# Patient Record
Sex: FEMALE | Race: WHITE
[De-identification: names, ages, dates, MRNs, and addresses within clinical notes are randomized per-mention and may not be internally consistent; named-entity substitution may affect disease eponyms.]

---

## 2017-01-10 ENCOUNTER — HOSPITAL ENCOUNTER (OUTPATIENT)
Dept: HOSPITAL 47 - RADNMMAIN | Age: 57
Discharge: HOME | End: 2017-01-10
Payer: MEDICAID

## 2017-01-10 DIAGNOSIS — I20.9: Primary | ICD-10-CM

## 2017-01-10 PROCEDURE — 93017 CV STRESS TEST TRACING ONLY: CPT

## 2017-01-10 PROCEDURE — 93350 STRESS TTE ONLY: CPT

## 2017-01-10 NOTE — ECHOS
DATE OF SERVICE: 01/10/2017



AGE:   56Y        SEX:  F        HT:  65      WT:  180 lbs.           

Protocol Lenin: X Others: Stress Echo Stage: III Dur. of Exercise:  

7-1/2 minutes 



*Heart Rate         Blood Pressure                     

*Rest:  70                    Rest:  163/85                           

*                              

*Max. Achieved:     152  Maximum BP:  180/63 

       85% PMHR:  138                    

          100% PMHR:  162          



*METS:  9.1    





INDICATIONS:  Chest pain. 



MEDICATIONS: 



EKG revealed a normal sinus rhythm without significant ST-T changes. 

Patient walked on standard Lenin protocol for 7-1/2 minutes, achieved 

a maximum heart rate of 152 beats, which is well above 85% of 

predicted maximal. She developed fatigue and shortness of breath, but 

did not have any anginal symptoms. EKG did not reveal any ST segment 

changes to indicate ischemia. By EKG criteria, this is a negative 

stress test with fair exercise capacity.  



Baseline echo images reveal normal wall motion and wall thickening of 

all segments.  



At peak exercise, there was good augmentation of left ventricular wall 

motion and wall thickening of all segments suggesting that there is no 

evidence of any stress-induced ischemia on this study.  



FINAL IMPRESSION: 

1. Fair exercise capacity with a negative stress test by EKG criteria. 

2. Normal stress echocardiogram.

## 2017-01-14 ENCOUNTER — HOSPITAL ENCOUNTER (OUTPATIENT)
Dept: HOSPITAL 47 - LABWHC1 | Age: 57
Discharge: HOME | End: 2017-01-14
Attending: ALLERGY & IMMUNOLOGY
Payer: MEDICAID

## 2017-01-14 DIAGNOSIS — L50.9: Primary | ICD-10-CM

## 2017-01-14 PROCEDURE — 86376 MICROSOMAL ANTIBODY EACH: CPT

## 2017-01-14 PROCEDURE — 36415 COLL VENOUS BLD VENIPUNCTURE: CPT

## 2017-01-14 PROCEDURE — 82784 ASSAY IGA/IGD/IGG/IGM EACH: CPT

## 2017-01-14 PROCEDURE — 86800 THYROGLOBULIN ANTIBODY: CPT

## 2017-01-14 PROCEDURE — 82785 ASSAY OF IGE: CPT

## 2017-01-16 LAB — IGG SERPL-MCNC: 896 MG/DL (ref 700–1600)

## 2017-01-19 ENCOUNTER — HOSPITAL ENCOUNTER (OUTPATIENT)
Dept: HOSPITAL 47 - LABWHC1 | Age: 57
Discharge: HOME | End: 2017-01-19
Attending: ALLERGY & IMMUNOLOGY
Payer: MEDICAID

## 2017-01-19 DIAGNOSIS — E06.3: Primary | ICD-10-CM

## 2017-01-19 PROCEDURE — 84443 ASSAY THYROID STIM HORMONE: CPT

## 2017-01-19 PROCEDURE — 36415 COLL VENOUS BLD VENIPUNCTURE: CPT

## 2017-02-08 LAB — MIS TEST REQUESTED (BLOOD): (no result)

## 2017-02-16 ENCOUNTER — HOSPITAL ENCOUNTER (OUTPATIENT)
Dept: HOSPITAL 47 - RADMRIMAIN | Age: 57
Discharge: HOME | End: 2017-02-16
Payer: MEDICAID

## 2017-02-16 DIAGNOSIS — M25.551: Primary | ICD-10-CM

## 2017-02-16 NOTE — MR
MR right hip

 

HISTORY: Right hip pain

 

Correlation to plain film second of February 2017

 

Multiplanar multisequence imaging obtained through the pelvis with small field-of-view images through
 the right hip

 

Coronal T2-weighted images show suggestion of increased signal at the femoral head however this is no
t correlated on additional imaging may be artifactual. Increased signal at the level of the acetabula
r labrum is present, difficult to exclude a labral tear. No sizable joint effusions are present. Dege
nerative disc changes are present at the lumbosacral junction loss of disc space, possible vacuum phe
nomenon, marginal spurring. There is some fluid signal present at the level of the greater trochanter
 at the insertion site, difficult to exclude a partial tear, strain. Articular cartilage signal is th
ought to be maintained. No significant hypertrophic change.

 

IMPRESSION: Degenerative disc disease lumbosacral junction. Difficult to exclude a partial tear, stra
in at the insertion of the gluteus tendon on the greater trochanter. Additional findings above.

## 2017-03-16 ENCOUNTER — HOSPITAL ENCOUNTER (OUTPATIENT)
Dept: HOSPITAL 47 - RADUSWWP | Age: 57
Discharge: HOME | End: 2017-03-16
Attending: ALLERGY & IMMUNOLOGY
Payer: MEDICAID

## 2017-03-16 DIAGNOSIS — E06.3: Primary | ICD-10-CM

## 2017-03-16 PROCEDURE — 81383 HLA II TYPING 1 ALLELE HR: CPT

## 2017-03-16 PROCEDURE — 76536 US EXAM OF HEAD AND NECK: CPT

## 2017-03-16 PROCEDURE — 83516 IMMUNOASSAY NONANTIBODY: CPT

## 2017-03-16 PROCEDURE — 82784 ASSAY IGA/IGD/IGG/IGM EACH: CPT

## 2017-03-16 PROCEDURE — 81376 HLA II TYPING 1 LOCUS LR: CPT

## 2017-03-16 NOTE — US
EXAMINATION TYPE: US thyroid st tissue head/neck

 

DATE OF EXAM: 3/16/2017 7:14 AM

 

COMPARISON: NONE

 

CLINICAL HISTORY: E06.3 Autoimmune thyroiditis.

 

GLAND SIZE:

 

Right Lobe: 3.6 x  1.2 x 1.2 cm

** Overall Parenchyma:  homogenous

Left Lobe: 3.8 x 1.2 x 0.9 cm

** Overall Parenchyma:  homogeneous

Isthmus Thickness:  0.2 cm

 

NODULES

 

RIGHT:   # of nodules measured on right: 0

 

 

LEFT:    # of nodules measured on left:  0

 

 

ISTHMUS:    # of nodules measured in the isthmus:  0

 

 

TECHNOLOGIST IMPRESSION:  Bilateral neck scanned, no abnormal lymphadenopathy noted.

 

Thyroid gland is small in size and homogeneous in echotexture without suspicious solid or cystic nodu
les seen.

 

IMPRESSION:

Small size thyroid otherwise unremarkable study.

## 2017-03-17 LAB
GLIADIN AB IGA, DEAMINATED: 7 UNITS (ref ?–20)
GLIADIN AB IGG, DEAMINATED: 3 UNITS (ref ?–20)

## 2017-03-21 LAB — MIS TEST REQUESTED (BLOOD): (no result)

## 2017-05-25 ENCOUNTER — HOSPITAL ENCOUNTER (OUTPATIENT)
Dept: HOSPITAL 47 - LABWHC1 | Age: 57
Discharge: HOME | End: 2017-05-25
Payer: MEDICAID

## 2017-05-25 DIAGNOSIS — E03.9: Primary | ICD-10-CM

## 2017-05-25 PROCEDURE — 36415 COLL VENOUS BLD VENIPUNCTURE: CPT

## 2017-05-25 PROCEDURE — 84443 ASSAY THYROID STIM HORMONE: CPT

## 2017-05-25 PROCEDURE — 84439 ASSAY OF FREE THYROXINE: CPT

## 2017-09-28 ENCOUNTER — HOSPITAL ENCOUNTER (OUTPATIENT)
Dept: HOSPITAL 47 - LABWHC1 | Age: 57
Discharge: HOME | End: 2017-09-28
Payer: MEDICAID

## 2017-09-28 DIAGNOSIS — E03.9: Primary | ICD-10-CM

## 2017-09-28 PROCEDURE — 84443 ASSAY THYROID STIM HORMONE: CPT

## 2017-09-28 PROCEDURE — 84439 ASSAY OF FREE THYROXINE: CPT

## 2017-09-28 PROCEDURE — 36415 COLL VENOUS BLD VENIPUNCTURE: CPT

## 2017-10-14 ENCOUNTER — HOSPITAL ENCOUNTER (EMERGENCY)
Dept: HOSPITAL 47 - EC | Age: 57
Discharge: HOME | End: 2017-10-14
Payer: MEDICAID

## 2017-10-14 VITALS
HEART RATE: 84 BPM | DIASTOLIC BLOOD PRESSURE: 80 MMHG | RESPIRATION RATE: 18 BRPM | SYSTOLIC BLOOD PRESSURE: 180 MMHG | TEMPERATURE: 98.1 F

## 2017-10-14 DIAGNOSIS — Z79.899: ICD-10-CM

## 2017-10-14 DIAGNOSIS — J20.9: Primary | ICD-10-CM

## 2017-10-14 DIAGNOSIS — Z88.5: ICD-10-CM

## 2017-10-14 DIAGNOSIS — E07.9: ICD-10-CM

## 2017-10-14 LAB
ALP SERPL-CCNC: 101 U/L (ref 38–126)
ALT SERPL-CCNC: 26 U/L (ref 9–52)
ANION GAP SERPL CALC-SCNC: 14 MMOL/L
APTT BLD: 22.3 SEC (ref 22–30)
AST SERPL-CCNC: 19 U/L (ref 14–36)
BASOPHILS # BLD AUTO: 0.1 K/UL (ref 0–0.2)
BASOPHILS NFR BLD AUTO: 1 %
BUN SERPL-SCNC: 19 MG/DL (ref 7–17)
CALCIUM SPEC-MCNC: 9.1 MG/DL (ref 8.4–10.2)
CH: 29.2
CHCM: 33.9
CHLORIDE SERPL-SCNC: 105 MMOL/L (ref 98–107)
CK SERPL-CCNC: 51 U/L (ref 30–135)
CO2 SERPL-SCNC: 21 MMOL/L (ref 22–30)
EOSINOPHIL # BLD AUTO: 0.3 K/UL (ref 0–0.7)
EOSINOPHIL NFR BLD AUTO: 3 %
ERYTHROCYTE [DISTWIDTH] IN BLOOD BY AUTOMATED COUNT: 4.64 M/UL (ref 3.8–5.4)
ERYTHROCYTE [DISTWIDTH] IN BLOOD: 12.9 % (ref 11.5–15.5)
GLUCOSE SERPL-MCNC: 96 MG/DL (ref 74–99)
HCT VFR BLD AUTO: 40.2 % (ref 34–46)
HDW: 2.51
HGB BLD-MCNC: 13.6 GM/DL (ref 11.4–16)
INR PPP: 1 (ref ?–1.2)
LUC NFR BLD AUTO: 3 %
LYMPHOCYTES # SPEC AUTO: 2.6 K/UL (ref 1–4.8)
LYMPHOCYTES NFR SPEC AUTO: 26 %
MAGNESIUM SPEC-SCNC: 1.8 MG/DL (ref 1.6–2.3)
MCH RBC QN AUTO: 29.3 PG (ref 25–35)
MCHC RBC AUTO-ENTMCNC: 33.9 G/DL (ref 31–37)
MCV RBC AUTO: 86.6 FL (ref 80–100)
MONOCYTES # BLD AUTO: 0.7 K/UL (ref 0–1)
MONOCYTES NFR BLD AUTO: 7 %
NEUTROPHILS # BLD AUTO: 6 K/UL (ref 1.3–7.7)
NEUTROPHILS NFR BLD AUTO: 61 %
NON-AFRICAN AMERICAN GFR(MDRD): 52
POTASSIUM SERPL-SCNC: 4.2 MMOL/L (ref 3.5–5.1)
PROT SERPL-MCNC: 6.6 G/DL (ref 6.3–8.2)
PT BLD: 9.8 SEC (ref 9–12)
SODIUM SERPL-SCNC: 140 MMOL/L (ref 137–145)
TROPONIN I SERPL-MCNC: <0.012 NG/ML (ref 0–0.03)
WBC # BLD AUTO: 0.34 10*3/UL
WBC # BLD AUTO: 9.9 K/UL (ref 3.8–10.6)
WBC (PEROX): 9.95

## 2017-10-14 PROCEDURE — 83880 ASSAY OF NATRIURETIC PEPTIDE: CPT

## 2017-10-14 PROCEDURE — 93005 ELECTROCARDIOGRAM TRACING: CPT

## 2017-10-14 PROCEDURE — 82553 CREATINE MB FRACTION: CPT

## 2017-10-14 PROCEDURE — 99285 EMERGENCY DEPT VISIT HI MDM: CPT

## 2017-10-14 PROCEDURE — 83735 ASSAY OF MAGNESIUM: CPT

## 2017-10-14 PROCEDURE — 36415 COLL VENOUS BLD VENIPUNCTURE: CPT

## 2017-10-14 PROCEDURE — 94640 AIRWAY INHALATION TREATMENT: CPT

## 2017-10-14 PROCEDURE — 84484 ASSAY OF TROPONIN QUANT: CPT

## 2017-10-14 PROCEDURE — 85730 THROMBOPLASTIN TIME PARTIAL: CPT

## 2017-10-14 PROCEDURE — 85379 FIBRIN DEGRADATION QUANT: CPT

## 2017-10-14 PROCEDURE — 80053 COMPREHEN METABOLIC PANEL: CPT

## 2017-10-14 PROCEDURE — 85610 PROTHROMBIN TIME: CPT

## 2017-10-14 PROCEDURE — 85025 COMPLETE CBC W/AUTO DIFF WBC: CPT

## 2017-10-14 PROCEDURE — 96374 THER/PROPH/DIAG INJ IV PUSH: CPT

## 2017-10-14 PROCEDURE — 71020: CPT

## 2017-10-14 PROCEDURE — 82550 ASSAY OF CK (CPK): CPT

## 2017-10-14 NOTE — XR
EXAMINATION TYPE: XR chest 2V

 

DATE OF EXAM: 10/14/2017

 

COMPARISON: None

 

HISTORY: 57-year-old female difficulty breathing, shortness of breath for 10 days

 

TECHNIQUE:  Frontal and lateral views

 

FINDINGS:  

Heart is normal size atherosclerotic arch calcifications. Mild interstitial prominence of a chronic a
ppearance. No consolidation or pleural effusion.

 

 

 

IMPRESSION:  

Chronic appearing changes, possible bronchitis or chronic asthma. Otherwise, no acute process seen.

## 2017-10-14 NOTE — ED
General Adult HPI





- General


Chief complaint: Shortness of Breath


Stated complaint: SOB


Time Seen by Provider: 10/14/17 16:06


Source: patient, RN notes reviewed


Mode of arrival: wheelchair


Limitations: no limitations





- History of Present Illness


Initial comments: 





57-year-old female with no significant past medical history presents for 

evaluation of dyspnea.  Patient states approximately 2 weeks ago she developed 

cough, this was dry nature.  She was treated for pneumonia by her primary care 

physician.  Cough improved, but dyspnea has not improved.  She has had 

worsening symptoms over the past several days.  Today her dyspnea significantly 

worsen.  Denies chest pain.  Denies lower extremity swelling.  No recent 

travel.  No fever, patient does state she had some chills and diaphoresis 

earlier in the week.  Patient does have family history of coronary artery 

disease, she has no known history herself.  Denies abdominal pain.  Denies 

nausea vomiting or diarrhea.





- Related Data


 Home Medications











 Medication  Instructions  Recorded  Confirmed


 


ALPRAZolam [Xanax] 0.5 mg PO DAILY PRN 10/14/17 10/14/17


 


Codeine Phosphate/Guaifenesin 5 ml PO Q6HR PRN 10/14/17 10/14/17





[Cheratussin AC Syrup]   


 


Levothyroxine Sodium [Synthroid] 150 mcg PO DAILY 10/14/17 10/14/17








 Previous Rx's











 Medication  Instructions  Recorded


 


Amoxicillin/Potassium Clav 1 tab PO Q12HR #14 tab 10/14/17





[Augmentin 875-125 Tablet]  


 


predniSONE 50 mg PO DAILY #5 tab 10/14/17











 Allergies











Allergy/AdvReac Type Severity Reaction Status Date / Time


 


acetaminophen AdvReac  SHORTNESS Verified 10/14/17 16:24





[From Darvocet-N]   OF BREATHE  


 


hydrocodone AdvReac  SHORTNESS Verified 10/14/17 16:24





   OF BREATHE  


 


propoxyphene napsylate AdvReac  SHORTNESS Verified 10/14/17 16:24





[From Darvocet-N]   OF BREATHE  














Review of Systems


ROS Statement: 


Those systems with pertinent positive or pertinent negative responses have been 

documented in the HPI.





ROS Other: All systems not noted in ROS Statement are negative.





Past Medical History


Past Medical History: Thyroid Disorder


Additional Past Medical History / Comment(s): mitral valve prolapse


History of Any Multi-Drug Resistant Organisms: None Reported


Past Surgical History: Appendectomy, Tubal Ligation


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


Past Psychological History: No Psychological Hx Reported


Smoking Status: Never smoker


Past Alcohol Use History: Occasional


Past Drug Use History: None Reported





General Exam


Limitations: no limitations


General appearance: alert, in no apparent distress


Head exam: Present: atraumatic, normocephalic


Eye exam: Present: normal appearance, PERRL


ENT exam: Present: normal exam


Neck exam: Present: normal inspection.  Absent: tenderness, meningismus


Respiratory exam: Present: normal lung sounds bilaterally.  Absent: respiratory 

distress, wheezes, rales


Cardiovascular Exam: Present: regular rate, normal rhythm


GI/Abdominal exam: Present: soft.  Absent: distended, tenderness


Extremities exam: Present: normal inspection, normal capillary refill.  Absent: 

pedal edema, calf tenderness


Back exam: Present: normal inspection


Neurological exam: Present: alert, oriented X3


Psychiatric exam: Present: normal affect, normal mood


Skin exam: Present: warm, dry, intact.  Absent: cyanosis, diaphoretic





Course


 Vital Signs











  10/14/17 10/14/17 10/14/17





  15:56 17:00 18:07


 


Temperature 98.2 F  


 


Pulse Rate 88 75 71


 


Respiratory 24 16 16





Rate   


 


Blood Pressure 195/98 188/90 189/92


 


O2 Sat by Pulse 98 98 98





Oximetry   














  10/14/17 10/14/17 10/14/17





  18:20 18:36 18:48


 


Temperature   


 


Pulse Rate 61 63 68


 


Respiratory   





Rate   


 


Blood Pressure 166/74  


 


O2 Sat by Pulse   





Oximetry   














EKG Findings





- EKG Comments:


EKG Findings:: EKG shows normal sinus rhythm, ventricular rate 74, CA interval 

146, QRS duration 74, , T-wave flattening and inversion in lead 3, no ST 

segment elevation or depression





Medical Decision Making





- Lab Data


Result diagrams: 


 10/14/17 17:01





 10/14/17 17:01


 Lab Results











  10/14/17 10/14/17 10/14/17 Range/Units





  17:01 17:01 17:01 


 


WBC   9.9   (3.8-10.6)  k/uL


 


RBC   4.64   (3.80-5.40)  m/uL


 


Hgb   13.6   (11.4-16.0)  gm/dL


 


Hct   40.2   (34.0-46.0)  %


 


MCV   86.6   (80.0-100.0)  fL


 


MCH   29.3   (25.0-35.0)  pg


 


MCHC   33.9   (31.0-37.0)  g/dL


 


RDW   12.9   (11.5-15.5)  %


 


Plt Count   382   (150-450)  k/uL


 


Neutrophils %   61   %


 


Lymphocytes %   26   %


 


Monocytes %   7   %


 


Eosinophils %   3   %


 


Basophils %   1   %


 


Neutrophils #   6.0   (1.3-7.7)  k/uL


 


Lymphocytes #   2.6   (1.0-4.8)  k/uL


 


Monocytes #   0.7   (0-1.0)  k/uL


 


Eosinophils #   0.3   (0-0.7)  k/uL


 


Basophils #   0.1   (0-0.2)  k/uL


 


PT     (9.0-12.0)  sec


 


INR     (<1.2)  


 


APTT     (22.0-30.0)  sec


 


D-Dimer     (<0.60)  mg/L FEU


 


Sodium    140  (137-145)  mmol/L


 


Potassium    4.2  (3.5-5.1)  mmol/L


 


Chloride    105  ()  mmol/L


 


Carbon Dioxide    21 L  (22-30)  mmol/L


 


Anion Gap    14  mmol/L


 


BUN    19 H  (7-17)  mg/dL


 


Creatinine    1.07 H  (0.52-1.04)  mg/dL


 


Est GFR (MDRD) Af Amer    >60  (>60 ml/min/1.73 sqM)  


 


Est GFR (MDRD) Non-Af    52  (>60 ml/min/1.73 sqM)  


 


Glucose    96  (74-99)  mg/dL


 


Calcium    9.1  (8.4-10.2)  mg/dL


 


Magnesium    1.8  (1.6-2.3)  mg/dL


 


Total Bilirubin    0.2  (0.2-1.3)  mg/dL


 


AST    19  (14-36)  U/L


 


ALT    26  (9-52)  U/L


 


Alkaline Phosphatase    101  ()  U/L


 


Total Creatine Kinase  51    ()  U/L


 


CK-MB (CK-2)  1.0    (0.0-2.4)  ng/mL


 


CK-MB (CK-2) Rel Index  2.0    


 


Troponin I  <0.012    (0.000-0.034)  ng/mL


 


NT-Pro-B Natriuret Pep     pg/mL


 


Total Protein    6.6  (6.3-8.2)  g/dL


 


Albumin    3.8  (3.5-5.0)  g/dL














  10/14/17 10/14/17 Range/Units





  17:01 17:01 


 


WBC    (3.8-10.6)  k/uL


 


RBC    (3.80-5.40)  m/uL


 


Hgb    (11.4-16.0)  gm/dL


 


Hct    (34.0-46.0)  %


 


MCV    (80.0-100.0)  fL


 


MCH    (25.0-35.0)  pg


 


MCHC    (31.0-37.0)  g/dL


 


RDW    (11.5-15.5)  %


 


Plt Count    (150-450)  k/uL


 


Neutrophils %    %


 


Lymphocytes %    %


 


Monocytes %    %


 


Eosinophils %    %


 


Basophils %    %


 


Neutrophils #    (1.3-7.7)  k/uL


 


Lymphocytes #    (1.0-4.8)  k/uL


 


Monocytes #    (0-1.0)  k/uL


 


Eosinophils #    (0-0.7)  k/uL


 


Basophils #    (0-0.2)  k/uL


 


PT  9.8   (9.0-12.0)  sec


 


INR  1.0   (<1.2)  


 


APTT  22.3   (22.0-30.0)  sec


 


D-Dimer  0.30   (<0.60)  mg/L FEU


 


Sodium    (137-145)  mmol/L


 


Potassium    (3.5-5.1)  mmol/L


 


Chloride    ()  mmol/L


 


Carbon Dioxide    (22-30)  mmol/L


 


Anion Gap    mmol/L


 


BUN    (7-17)  mg/dL


 


Creatinine    (0.52-1.04)  mg/dL


 


Est GFR (MDRD) Af Amer    (>60 ml/min/1.73 sqM)  


 


Est GFR (MDRD) Non-Af    (>60 ml/min/1.73 sqM)  


 


Glucose    (74-99)  mg/dL


 


Calcium    (8.4-10.2)  mg/dL


 


Magnesium    (1.6-2.3)  mg/dL


 


Total Bilirubin    (0.2-1.3)  mg/dL


 


AST    (14-36)  U/L


 


ALT    (9-52)  U/L


 


Alkaline Phosphatase    ()  U/L


 


Total Creatine Kinase    ()  U/L


 


CK-MB (CK-2)    (0.0-2.4)  ng/mL


 


CK-MB (CK-2) Rel Index    


 


Troponin I    (0.000-0.034)  ng/mL


 


NT-Pro-B Natriuret Pep   209  pg/mL


 


Total Protein    (6.3-8.2)  g/dL


 


Albumin    (3.5-5.0)  g/dL














Disposition


Clinical Impression: 


 Acute bronchitis





Disposition: HOME SELF-CARE


Condition: Good


Instructions:  Acute Bronchitis (ED)


Prescriptions: 


Amoxicillin/Potassium Clav [Augmentin 875-125 Tablet] 1 tab PO Q12HR #14 tab


predniSONE 50 mg PO DAILY #5 tab


Referrals: 


Shahla Pedro MD [Primary Care Provider] - 1-2 days

## 2018-01-03 ENCOUNTER — HOSPITAL ENCOUNTER (OUTPATIENT)
Dept: HOSPITAL 47 - RADXRMAIN | Age: 58
Discharge: HOME | End: 2018-01-03
Payer: MEDICAID

## 2018-01-03 DIAGNOSIS — M79.644: Primary | ICD-10-CM

## 2018-01-04 NOTE — XR
EXAMINATION TYPE: XR finger RT

 

DATE OF EXAM: 1/3/2018

 

COMPARISON: NONE

 

HISTORY: Pain

 

TECHNIQUE: Three views are submitted.

 

FINDINGS:

The osseous structures are intact.  The joint spaces are preserved and there is no acute fracture or 
dislocation.  

 

IMPRESSION:

1.  No definite acute fracture or dislocation if symptoms persist, follow-up study in 7 to 10 days wo
uld be suggested

## 2018-06-15 ENCOUNTER — HOSPITAL ENCOUNTER (OUTPATIENT)
Dept: HOSPITAL 47 - RADMAMWWP | Age: 58
Discharge: HOME | End: 2018-06-15
Payer: MEDICAID

## 2018-06-15 DIAGNOSIS — Z12.31: Primary | ICD-10-CM

## 2018-06-15 PROCEDURE — 77063 BREAST TOMOSYNTHESIS BI: CPT

## 2018-06-15 PROCEDURE — 77067 SCR MAMMO BI INCL CAD: CPT

## 2018-06-19 NOTE — MM
Reason for exam: screening  (asymptomatic).

Last mammogram was performed 6 years and 3 months ago.



History:

Patient is postmenopausal.

Family history of breast cancer in sister at age 48.



Physical Findings:

A clinical breast exam by your physician is recommended on an annual basis and 

results should be correlated with mammographic findings.



MG 3D Screening Mammo W/Cad

Bilateral CC and MLO view(s) were taken.

Prior study comparison: March 15, 2012, bilateral digital screening mammo w/CAD.  

April 9, 2003, mammogram, performed at [facility] [place].

There are scattered fibroglandular densities.  No significant changes when 

compared with prior studies.





ASSESSMENT: Negative, BI-RAD 1



RECOMMENDATION:

Routine screening mammogram of both breasts in 1 year.

## 2019-01-11 ENCOUNTER — HOSPITAL ENCOUNTER (OUTPATIENT)
Dept: HOSPITAL 47 - RADCTMAIN | Age: 59
Discharge: HOME | End: 2019-01-11
Attending: OTOLARYNGOLOGY
Payer: MEDICAID

## 2019-01-11 DIAGNOSIS — J34.89: ICD-10-CM

## 2019-01-11 DIAGNOSIS — J32.2: Primary | ICD-10-CM

## 2019-01-11 PROCEDURE — 70486 CT MAXILLOFACIAL W/O DYE: CPT

## 2019-01-11 NOTE — CT
EXAMINATION TYPE: CT sinus wo con

 

DATE OF EXAM: 1/11/2019

 

COMPARISON: None

 

HISTORY: Chronic Sinusitis

 

CT DLP: 234 mGycm.  Automated Exposure Control for Dose Reduction was Utilized.

 

TECHNIQUE: CT scan of the sinuses is performed without contrast, axial images are obtained, coronal r
eformatted images are also reviewed.

 

FINDINGS: There is a slight nasal septal deviation there is very minimal mucosal thickening involving
 the maxillary sinus on the left. Mild to moderate mucosal thickening involving ethmoid air cells. Fr
ontal sinus and sphenoid sinus demonstrate no significant mucosal thickening. There are no air-fluid 
levels. Right ostiomeatal complex is patent. Right ostiomeatal complex is occluded. 

 

Visualized portion of mastoid air cells show no abnormal opacification.  The globes are intact bilate
rally.  

 

IMPRESSION:

1. Mild-to-moderate chronic appearing sinusitis and ethmoid air cells with occlusion of the left osti
omeatal complex. 

2. Minimal mucosal thickening involving the left maxillary sinus.

## 2019-04-12 ENCOUNTER — HOSPITAL ENCOUNTER (OUTPATIENT)
Dept: HOSPITAL 47 - ORWHC2ENDO | Age: 59
Discharge: HOME | End: 2019-04-12
Attending: INTERNAL MEDICINE
Payer: MEDICAID

## 2019-04-12 VITALS — BODY MASS INDEX: 32.4 KG/M2

## 2019-04-12 VITALS — RESPIRATION RATE: 18 BRPM

## 2019-04-12 VITALS — DIASTOLIC BLOOD PRESSURE: 78 MMHG | HEART RATE: 65 BPM | SYSTOLIC BLOOD PRESSURE: 131 MMHG

## 2019-04-12 VITALS — TEMPERATURE: 97.7 F

## 2019-04-12 DIAGNOSIS — K44.9: ICD-10-CM

## 2019-04-12 DIAGNOSIS — Z79.899: ICD-10-CM

## 2019-04-12 DIAGNOSIS — Z88.5: ICD-10-CM

## 2019-04-12 DIAGNOSIS — Z12.11: Primary | ICD-10-CM

## 2019-04-12 DIAGNOSIS — Z79.890: ICD-10-CM

## 2019-04-12 DIAGNOSIS — K21.9: ICD-10-CM

## 2019-04-12 DIAGNOSIS — D12.2: ICD-10-CM

## 2019-04-12 DIAGNOSIS — Z88.8: ICD-10-CM

## 2019-04-12 PROCEDURE — 43235 EGD DIAGNOSTIC BRUSH WASH: CPT

## 2019-04-12 PROCEDURE — 45385 COLONOSCOPY W/LESION REMOVAL: CPT

## 2019-04-12 PROCEDURE — 88305 TISSUE EXAM BY PATHOLOGIST: CPT

## 2019-04-12 NOTE — P.PCN
Date of Procedure: 04/12/19


Procedure(s) Performed: 


Brief history:


Patient is a pleasant 59-year-old white female, scheduled for an elective upper 

endoscopy as well as colonoscopy as a part of evaluation of GERD and screening 

for colorectal neoplasia.





Procedure performed:


Esophagogastroduodenoscopy


Colonoscopy with snare polypectomy





Preoperative diagnosis:


GERD/


Screening for colon cancer





Anesthesia: MAC





Procedure:


After informed consent was obtained from the patient  was brought into the 

endoscopy unit and IV  sedation was administered by anesthesia under continuous 

monitoring.  Initially upper endoscopy was done.  The Olympus  video 

endoscope was inserted inserted into the mouth and esophagus intubated without 

any difficulty and was gradually advanced into the stomach and duodenum and 

carefully examined.  The bulb and second part of the duodenum appeared normal.  

The scope was then withdrawn into the stomach adequately insufflated with air 

and upon careful examination  the antrum and body, cardia and fundus appeared 

normal.  The scope was then withdrawn into the esophagus.  The GE junction was 

located at 39 cm to the incisors.  Small hiatal hernia noted.  It appeared 

regular with no erythema erosions or ulcerations.  Rest of the esophagus 

appeared normal.  Patient tolerated the procedure well.





At this time the patient continued to remain sedation.  Initial digital rectal 

examination was normal.  Olympus  video colonoscope was then inserted into

the rectum and gradually advanced to the cecum without any difficulty.  Careful 

examination was performed as the scope was gradually being withdrawn.  The prep 

was excellent.  The cecum  appeared normal.  In the ascending colon there was a 

1 cm flat polyp that was removed by snare polypectomy.  Rest of theascending 

colon, transverse colon, descending colon, sigmoid colon and rectum appeared 

normal.  Retroflexion was performed in the rectum and no lesions were noted.  

Patient tolerated the procedure well.





Impression:


1.  Upper endoscopy revealed small hiatal hernia but no evidence of esophagitis 

or peptic ulcer disease


2.  Colonoscopy revealed 1 cm flat ascending colon polyp status post polypectomy








Recommendations:


Findings of this examination were discussed with the patient as well as her 

family.  She was advised to follow with the biopsy results.  If the biopsy shows

adenoma she can have a repeat colonoscopy in 3-5 years.  Because of the GERD 

symptoms she was advised to continue with Nexium daily and follow antireflux 

measures.

## 2019-07-15 ENCOUNTER — HOSPITAL ENCOUNTER (OUTPATIENT)
Dept: HOSPITAL 47 - LABWHC1 | Age: 59
Discharge: HOME | End: 2019-07-15
Attending: FAMILY MEDICINE
Payer: MEDICAID

## 2019-07-15 DIAGNOSIS — Z00.00: Primary | ICD-10-CM

## 2019-07-15 LAB
ALBUMIN SERPL-MCNC: 4.2 G/DL (ref 3.8–4.9)
ALBUMIN/GLOB SERPL: 1.83 G/DL (ref 1.6–3.17)
ALP SERPL-CCNC: 90 U/L (ref 41–126)
ALT SERPL-CCNC: 17 U/L (ref 8–44)
ANION GAP SERPL CALC-SCNC: 5.8 MMOL/L (ref 4–12)
AST SERPL-CCNC: 17 U/L (ref 13–35)
BUN SERPL-SCNC: 21 MG/DL (ref 9–27)
BUN/CREAT SERPL: 26.25 RATIO (ref 12–20)
CALCIUM SPEC-MCNC: 9.1 MG/DL (ref 8.7–10.3)
CHLORIDE SERPL-SCNC: 109 MMOL/L (ref 96–109)
CHOLEST SERPL-MCNC: 194 MG/DL (ref 0–200)
CO2 SERPL-SCNC: 25.2 MMOL/L (ref 21.6–31.8)
GLOBULIN SER CALC-MCNC: 2.3 G/DL (ref 1.6–3.3)
GLUCOSE SERPL-MCNC: 97 MG/DL (ref 70–110)
HDLC SERPL-MCNC: 66 MG/DL (ref 40–60)
LDLC SERPL CALC-MCNC: 104.6 MG/DL (ref 0–131)
POTASSIUM SERPL-SCNC: 4.6 MMOL/L (ref 3.5–5.5)
PROT SERPL-MCNC: 6.5 G/DL (ref 6.2–8.2)
SODIUM SERPL-SCNC: 140 MMOL/L (ref 135–145)
TRIGL SERPL-MCNC: 117 MG/DL (ref 0–149)
VLDLC SERPL CALC-MCNC: 23.4 MG/DL (ref 5–40)

## 2019-07-15 PROCEDURE — 80053 COMPREHEN METABOLIC PANEL: CPT

## 2019-07-15 PROCEDURE — 80061 LIPID PANEL: CPT

## 2019-07-15 PROCEDURE — 84443 ASSAY THYROID STIM HORMONE: CPT

## 2019-07-15 PROCEDURE — 84439 ASSAY OF FREE THYROXINE: CPT

## 2019-07-15 PROCEDURE — 36415 COLL VENOUS BLD VENIPUNCTURE: CPT

## 2020-03-10 ENCOUNTER — HOSPITAL ENCOUNTER (EMERGENCY)
Dept: HOSPITAL 47 - EC | Age: 60
Discharge: HOME | End: 2020-03-10
Payer: MEDICAID

## 2020-03-10 VITALS
DIASTOLIC BLOOD PRESSURE: 80 MMHG | TEMPERATURE: 100.2 F | RESPIRATION RATE: 16 BRPM | SYSTOLIC BLOOD PRESSURE: 131 MMHG | HEART RATE: 87 BPM

## 2020-03-10 DIAGNOSIS — E07.9: ICD-10-CM

## 2020-03-10 DIAGNOSIS — Z79.890: ICD-10-CM

## 2020-03-10 DIAGNOSIS — Z88.8: ICD-10-CM

## 2020-03-10 DIAGNOSIS — J02.0: Primary | ICD-10-CM

## 2020-03-10 DIAGNOSIS — Z88.5: ICD-10-CM

## 2020-03-10 LAB
ALBUMIN SERPL-MCNC: 4.1 G/DL (ref 3.5–5)
ALP SERPL-CCNC: 89 U/L (ref 38–126)
ALT SERPL-CCNC: 26 U/L (ref 4–34)
ANION GAP SERPL CALC-SCNC: 7 MMOL/L
AST SERPL-CCNC: 29 U/L (ref 14–36)
BASOPHILS # BLD AUTO: 0.1 K/UL (ref 0–0.2)
BASOPHILS NFR BLD AUTO: 0 %
BUN SERPL-SCNC: 14 MG/DL (ref 7–17)
CALCIUM SPEC-MCNC: 8.7 MG/DL (ref 8.4–10.2)
CHLORIDE SERPL-SCNC: 103 MMOL/L (ref 98–107)
CO2 SERPL-SCNC: 24 MMOL/L (ref 22–30)
EOSINOPHIL # BLD AUTO: 0.1 K/UL (ref 0–0.7)
EOSINOPHIL NFR BLD AUTO: 1 %
ERYTHROCYTE [DISTWIDTH] IN BLOOD BY AUTOMATED COUNT: 4.67 M/UL (ref 3.8–5.4)
ERYTHROCYTE [DISTWIDTH] IN BLOOD: 13.5 % (ref 11.5–15.5)
GLUCOSE SERPL-MCNC: 93 MG/DL (ref 74–99)
HCT VFR BLD AUTO: 40 % (ref 34–46)
HGB BLD-MCNC: 13.2 GM/DL (ref 11.4–16)
LYMPHOCYTES # SPEC AUTO: 1.3 K/UL (ref 1–4.8)
LYMPHOCYTES NFR SPEC AUTO: 8 %
MCH RBC QN AUTO: 28.3 PG (ref 25–35)
MCHC RBC AUTO-ENTMCNC: 33 G/DL (ref 31–37)
MCV RBC AUTO: 85.7 FL (ref 80–100)
MONOCYTES # BLD AUTO: 0.8 K/UL (ref 0–1)
MONOCYTES NFR BLD AUTO: 5 %
NEUTROPHILS # BLD AUTO: 13.4 K/UL (ref 1.3–7.7)
NEUTROPHILS NFR BLD AUTO: 84 %
PH UR: 7.5 [PH] (ref 5–8)
PLATELET # BLD AUTO: 341 K/UL (ref 150–450)
POTASSIUM SERPL-SCNC: 4.1 MMOL/L (ref 3.5–5.1)
PROT SERPL-MCNC: 6.9 G/DL (ref 6.3–8.2)
SODIUM SERPL-SCNC: 134 MMOL/L (ref 137–145)
SP GR UR: 1.02 (ref 1–1.03)
UROBILINOGEN UR QL STRIP: <2 MG/DL (ref ?–2)
WBC # BLD AUTO: 15.9 K/UL (ref 3.8–10.6)

## 2020-03-10 PROCEDURE — 87040 BLOOD CULTURE FOR BACTERIA: CPT

## 2020-03-10 PROCEDURE — 96360 HYDRATION IV INFUSION INIT: CPT

## 2020-03-10 PROCEDURE — 71046 X-RAY EXAM CHEST 2 VIEWS: CPT

## 2020-03-10 PROCEDURE — 83605 ASSAY OF LACTIC ACID: CPT

## 2020-03-10 PROCEDURE — 86308 HETEROPHILE ANTIBODY SCREEN: CPT

## 2020-03-10 PROCEDURE — 85025 COMPLETE CBC W/AUTO DIFF WBC: CPT

## 2020-03-10 PROCEDURE — 80053 COMPREHEN METABOLIC PANEL: CPT

## 2020-03-10 PROCEDURE — 36415 COLL VENOUS BLD VENIPUNCTURE: CPT

## 2020-03-10 PROCEDURE — 87430 STREP A AG IA: CPT

## 2020-03-10 PROCEDURE — 81003 URINALYSIS AUTO W/O SCOPE: CPT

## 2020-03-10 PROCEDURE — 99284 EMERGENCY DEPT VISIT MOD MDM: CPT

## 2020-03-10 NOTE — ED
General Adult HPI





- General


Chief complaint: Headache


Stated complaint: head & neck pain/trouble swallowing


Time Seen by Provider: 03/10/20 15:15


Source: patient, RN notes reviewed, old records reviewed


Mode of arrival: ambulatory


Limitations: no limitations





- History of Present Illness


Initial comments: 





This is a 60-year-old female presents emergency department stating that she 

started with a fever 90s ago.  Patient states last Friday she was told she had 

influenza but she was beyond the number of days to be treated.  Patient states 

she continues to have a fever over the weekend complains of some sore throat 

cough with no sputum production and a mild headache.  Patient states she also 

has bilateral neck pain in the sternocleidomastoid muscle region.  Patient 

denies any dysuria hematuria urinary frequency.  Patient denies any abdominal 

pain.  Patient denies any nausea vomiting.  Patient denies any rashes or areas 

of redness.  Patient denies any stiff neck.





- Related Data


                                Home Medications











 Medication  Instructions  Recorded  Confirmed


 


Levothyroxine Sodium [Synthroid] 150 mcg PO DAILY 10/14/17 04/12/19


 


Cannabidiol (Cbd) Extract 0 mg PO DAILY PRN 04/10/19 04/12/19





[Epidiolex]   


 


Ibuprofen [Advil] 200 mg PO Q6HR PRN 04/10/19 04/12/19


 


L.acidoph,Paracasei, B.lactis 1 each PO DAILY 04/10/19 04/12/19





[Probiotic]   


 


Magnesium 200 mg PO DAILY 04/10/19 04/12/19








                                  Previous Rx's











 Medication  Instructions  Recorded


 


Amoxicillin 500 mg PO Q8H #30 capsule 03/10/20


 


predniSONE [Deltasone] 40 mg PO DAILY #8 tab 03/10/20











                                    Allergies











Allergy/AdvReac Type Severity Reaction Status Date / Time


 


hydrocodone AdvReac  SHORTNESS Verified 03/10/20 15:19





   OF BREATHE  


 


propoxyphene napsylate AdvReac  SHORTNESS Verified 03/10/20 15:19





[From Darvocet-N]   OF BREATHE  














Review of Systems


ROS Statement: 


Those systems with pertinent positive or pertinent negative responses have been 

documented in the HPI.





ROS Other: All systems not noted in ROS Statement are negative.





Past Medical History


Past Medical History: GERD/Reflux, Mitral Valve Prolapse (MVP), Pneumonia, 

Thyroid Disorder


Additional Past Medical History / Comment(s): mitral valve prolapse, hx. colon 

polyps, recent change in bowel habits, intermittent RLQ pain


History of Any Multi-Drug Resistant Organisms: None Reported


Past Surgical History: Appendectomy, Tubal Ligation


Additional Past Surgical History / Comment(s): parotid gland tumor removed, 

colonoscopies


Past Anesthesia/Blood Transfusion Reactions: No Reported Reaction, Family 

History of Problems w/ Anesthesia


Additional Past Anesthesia/Blood Transfusion Reaction / Comment(s): 16 y.o. son 

had some chest pain that was attributed to anesthesia per pt.


Past Psychological History: No Psychological Hx Reported


Smoking Status: Never smoker


Past Alcohol Use History: None Reported


Past Drug Use History: None Reported





- Past Family History


  ** Mother Sister(s)


Family Medical History: Cancer


Additional Family Medical History / Comment(s): mom colon, sister brain & breast

cancer





General Exam





- General Exam Comments


Initial Comments: 





GENERAL:


Patient is well-developed and well-nourished.  Patient is nontoxic and well-hy

drated and is in no acute distress.





ENT:


Neck is soft and supple.  No significant lymphadenopathy is noted.  Oropharynx 

is clear.  Moist mucous membranes.  Neck has full range of motion without elici

ting any pain.  There is no thyroid enlargement and no masses were felt.





EYES:


The sclera were anicteric and conjunctiva were pink and moist.  Extraocular 

movements were intact and pupils were equal round and reactive to light.  

Eyelids were unremarkable.





PULMONARY:


Unlabored respirations.  Good breath sounds bilaterally.  No audible rales 

rhonchi or wheezing was noted.





CARDIOVASCULAR:


There is a regular rate and rhythm without any murmurs gallops or rubs.  Femoral

pulses are equal bilaterally





ABDOMEN:


Soft and nontender with normal bowel sounds.  No palpable organomegaly was 

noted.  There is no palpable pulsatile mass.





SKIN:


Skin is clear with no lesions or rashes and otherwise unremarkable.





NEUROLOGIC:


Patient is alert and oriented x3.  Cranial nerves II through XII are grossly int

act.  Motor and sensory are also intact.  Normal speech, volume and content.  

Symmetrical smile.  Cerebellar exam grossly intact.





MUSCULOSKELETAL:


Normal extremities with adequate strength and full range of motion.  No lower 

extremity swelling or edema.  No calf tenderness.





LYMPHATICS:


No significant lymphadenopathy is noted





PSYCHIATRIC:


Normal psychiatric evaluation.  Normal interpersonal interactions appears 

functionally intact in deals appropriately with others.  No signs of depression.

 No signs of anxiety.  No delusions.  No hallucinations.


Limitations: no limitations





Course


                                   Vital Signs











  03/10/20 03/10/20 03/10/20





  15:17 15:36 16:13


 


Temperature 100.2 F H 101.7 F H 


 


Pulse Rate 107 H  


 


Respiratory 22  22





Rate   


 


Blood Pressure 176/75  


 


O2 Sat by Pulse 95  





Oximetry   














  03/10/20





  17:25


 


Temperature 100.2 F H


 


Pulse Rate 87


 


Respiratory 16





Rate 


 


Blood Pressure 131/80


 


O2 Sat by Pulse 94 L





Oximetry 














Medical Decision Making





- Medical Decision Making





Chest x-ray shows no acute abnormality.





I went back into the room to reevaluate the patient she was feeling considerably

better and no longer had any neck discomfort or headache.





Patient received prednisone and amoxicillin emergency department.





- Lab Data


Result diagrams: 


                                 03/10/20 16:13





                                 03/10/20 16:13


                                   Lab Results











  03/10/20 03/10/20 03/10/20 Range/Units





  16:13 16:13 16:13 


 


WBC  15.9 H    (3.8-10.6)  k/uL


 


RBC  4.67    (3.80-5.40)  m/uL


 


Hgb  13.2    (11.4-16.0)  gm/dL


 


Hct  40.0    (34.0-46.0)  %


 


MCV  85.7    (80.0-100.0)  fL


 


MCH  28.3    (25.0-35.0)  pg


 


MCHC  33.0    (31.0-37.0)  g/dL


 


RDW  13.5    (11.5-15.5)  %


 


Plt Count  341    (150-450)  k/uL


 


Neutrophils %  84    %


 


Lymphocytes %  8    %


 


Monocytes %  5    %


 


Eosinophils %  1    %


 


Basophils %  0    %


 


Neutrophils #  13.4 H    (1.3-7.7)  k/uL


 


Lymphocytes #  1.3    (1.0-4.8)  k/uL


 


Monocytes #  0.8    (0-1.0)  k/uL


 


Eosinophils #  0.1    (0-0.7)  k/uL


 


Basophils #  0.1    (0-0.2)  k/uL


 


Sodium    134 L  (137-145)  mmol/L


 


Potassium    4.1  (3.5-5.1)  mmol/L


 


Chloride    103  ()  mmol/L


 


Carbon Dioxide    24  (22-30)  mmol/L


 


Anion Gap    7  mmol/L


 


BUN    14  (7-17)  mg/dL


 


Creatinine    0.73  (0.52-1.04)  mg/dL


 


Est GFR (CKD-EPI)AfAm    >90  (>60 ml/min/1.73 sqM)  


 


Est GFR (CKD-EPI)NonAf    89  (>60 ml/min/1.73 sqM)  


 


Glucose    93  (74-99)  mg/dL


 


Plasma Lactic Acid Tommy     (0.7-2.0)  mmol/L


 


Calcium    8.7  (8.4-10.2)  mg/dL


 


Total Bilirubin    0.7  (0.2-1.3)  mg/dL


 


AST    29  (14-36)  U/L


 


ALT    26  (4-34)  U/L


 


Alkaline Phosphatase    89  ()  U/L


 


Total Protein    6.9  (6.3-8.2)  g/dL


 


Albumin    4.1  (3.5-5.0)  g/dL


 


Urine Color   Yellow   


 


Urine Appearance   Clear   (Clear)  


 


Urine pH   7.5   (5.0-8.0)  


 


Ur Specific Gravity   1.018   (1.001-1.035)  


 


Urine Protein   Negative   (Negative)  


 


Urine Glucose (UA)   Negative   (Negative)  


 


Urine Ketones   Negative   (Negative)  


 


Urine Blood   Negative   (Negative)  


 


Urine Nitrite   Negative   (Negative)  


 


Urine Bilirubin   Negative   (Negative)  


 


Urine Urobilinogen   <2.0   (<2.0)  mg/dL


 


Ur Leukocyte Esterase   Negative   (Negative)  


 


Heterophile Antibody     (Negative)  


 


Group A Strep Rapid     (Negative)  














  03/10/20 03/10/20 03/10/20 Range/Units





  16:13 16:13 17:23 


 


WBC     (3.8-10.6)  k/uL


 


RBC     (3.80-5.40)  m/uL


 


Hgb     (11.4-16.0)  gm/dL


 


Hct     (34.0-46.0)  %


 


MCV     (80.0-100.0)  fL


 


MCH     (25.0-35.0)  pg


 


MCHC     (31.0-37.0)  g/dL


 


RDW     (11.5-15.5)  %


 


Plt Count     (150-450)  k/uL


 


Neutrophils %     %


 


Lymphocytes %     %


 


Monocytes %     %


 


Eosinophils %     %


 


Basophils %     %


 


Neutrophils #     (1.3-7.7)  k/uL


 


Lymphocytes #     (1.0-4.8)  k/uL


 


Monocytes #     (0-1.0)  k/uL


 


Eosinophils #     (0-0.7)  k/uL


 


Basophils #     (0-0.2)  k/uL


 


Sodium     (137-145)  mmol/L


 


Potassium     (3.5-5.1)  mmol/L


 


Chloride     ()  mmol/L


 


Carbon Dioxide     (22-30)  mmol/L


 


Anion Gap     mmol/L


 


BUN     (7-17)  mg/dL


 


Creatinine     (0.52-1.04)  mg/dL


 


Est GFR (CKD-EPI)AfAm     (>60 ml/min/1.73 sqM)  


 


Est GFR (CKD-EPI)NonAf     (>60 ml/min/1.73 sqM)  


 


Glucose     (74-99)  mg/dL


 


Plasma Lactic Acid Tommy  0.9    (0.7-2.0)  mmol/L


 


Calcium     (8.4-10.2)  mg/dL


 


Total Bilirubin     (0.2-1.3)  mg/dL


 


AST     (14-36)  U/L


 


ALT     (4-34)  U/L


 


Alkaline Phosphatase     ()  U/L


 


Total Protein     (6.3-8.2)  g/dL


 


Albumin     (3.5-5.0)  g/dL


 


Urine Color     


 


Urine Appearance     (Clear)  


 


Urine pH     (5.0-8.0)  


 


Ur Specific Gravity     (1.001-1.035)  


 


Urine Protein     (Negative)  


 


Urine Glucose (UA)     (Negative)  


 


Urine Ketones     (Negative)  


 


Urine Blood     (Negative)  


 


Urine Nitrite     (Negative)  


 


Urine Bilirubin     (Negative)  


 


Urine Urobilinogen     (<2.0)  mg/dL


 


Ur Leukocyte Esterase     (Negative)  


 


Heterophile Antibody   Negative   (Negative)  


 


Group A Strep Rapid    Positive A  (Negative)  














Disposition


Clinical Impression: 


 Strep pharyngitis





Disposition: HOME SELF-CARE


Condition: Good


Instructions (If sedation given, give patient instructions):  Strep Throat (ED)


Prescriptions: 


Amoxicillin 500 mg PO Q8H #30 capsule


predniSONE [Deltasone] 40 mg PO DAILY #8 tab


Is patient prescribed a controlled substance at d/c from ED?: No


Referrals: 


Shahla Pedro MD [Primary Care Provider] - 1-2 days


Time of Disposition: 17:42

## 2020-03-10 NOTE — XR
EXAMINATION TYPE: XR chest 2V

 

DATE OF EXAM: 3/10/2020

 

COMPARISON: 10/14/2017

 

HISTORY: Short of breath

 

TECHNIQUE:

 

FINDINGS: Heart and mediastinum are normal. Lungs are clear. Diaphragm is normal. Bony thorax appears
 normal. There is minimal atheromatous change in the thoracic aorta.

 

IMPRESSION: No cardiopulmonary disease. No change.

## 2020-06-08 ENCOUNTER — HOSPITAL ENCOUNTER (OUTPATIENT)
Dept: HOSPITAL 47 - LABWHC1 | Age: 60
Discharge: HOME | End: 2020-06-08
Attending: PEDIATRICS
Payer: MEDICAID

## 2020-06-08 DIAGNOSIS — Z11.59: Primary | ICD-10-CM

## 2020-06-10 ENCOUNTER — HOSPITAL ENCOUNTER (OUTPATIENT)
Dept: HOSPITAL 47 - LABWHC1 | Age: 60
Discharge: HOME | End: 2020-06-10
Attending: PEDIATRICS
Payer: MEDICAID

## 2020-06-10 DIAGNOSIS — Z11.59: Primary | ICD-10-CM

## 2020-07-21 ENCOUNTER — HOSPITAL ENCOUNTER (OUTPATIENT)
Dept: HOSPITAL 47 - EC | Age: 60
Setting detail: OBSERVATION
LOS: 2 days | Discharge: HOME | End: 2020-07-23
Attending: HOSPITALIST | Admitting: HOSPITALIST
Payer: MEDICAID

## 2020-07-21 DIAGNOSIS — I25.10: ICD-10-CM

## 2020-07-21 DIAGNOSIS — Z87.19: ICD-10-CM

## 2020-07-21 DIAGNOSIS — Z80.3: ICD-10-CM

## 2020-07-21 DIAGNOSIS — Z90.49: ICD-10-CM

## 2020-07-21 DIAGNOSIS — E03.9: ICD-10-CM

## 2020-07-21 DIAGNOSIS — Z79.899: ICD-10-CM

## 2020-07-21 DIAGNOSIS — I44.7: ICD-10-CM

## 2020-07-21 DIAGNOSIS — Z79.890: ICD-10-CM

## 2020-07-21 DIAGNOSIS — Z88.5: ICD-10-CM

## 2020-07-21 DIAGNOSIS — K21.9: ICD-10-CM

## 2020-07-21 DIAGNOSIS — Z87.01: ICD-10-CM

## 2020-07-21 DIAGNOSIS — Z82.49: ICD-10-CM

## 2020-07-21 DIAGNOSIS — R07.89: Primary | ICD-10-CM

## 2020-07-21 DIAGNOSIS — I34.0: ICD-10-CM

## 2020-07-21 DIAGNOSIS — Z80.0: ICD-10-CM

## 2020-07-21 DIAGNOSIS — E66.9: ICD-10-CM

## 2020-07-21 DIAGNOSIS — Z03.818: ICD-10-CM

## 2020-07-21 DIAGNOSIS — I10: ICD-10-CM

## 2020-07-21 DIAGNOSIS — I34.1: ICD-10-CM

## 2020-07-21 LAB
ALBUMIN SERPL-MCNC: 4.2 G/DL (ref 3.5–5)
ALP SERPL-CCNC: 80 U/L (ref 38–126)
ALT SERPL-CCNC: 16 U/L (ref 4–34)
ANION GAP SERPL CALC-SCNC: 9 MMOL/L
APTT BLD: 22.6 SEC (ref 22–30)
AST SERPL-CCNC: 22 U/L (ref 14–36)
BASOPHILS # BLD AUTO: 0 K/UL (ref 0–0.2)
BASOPHILS NFR BLD AUTO: 0 %
BUN SERPL-SCNC: 16 MG/DL (ref 7–17)
CALCIUM SPEC-MCNC: 9.4 MG/DL (ref 8.4–10.2)
CHLORIDE SERPL-SCNC: 104 MMOL/L (ref 98–107)
CO2 SERPL-SCNC: 25 MMOL/L (ref 22–30)
EOSINOPHIL # BLD AUTO: 0.2 K/UL (ref 0–0.7)
EOSINOPHIL NFR BLD AUTO: 3 %
ERYTHROCYTE [DISTWIDTH] IN BLOOD BY AUTOMATED COUNT: 4.44 M/UL (ref 3.8–5.4)
ERYTHROCYTE [DISTWIDTH] IN BLOOD: 13.6 % (ref 11.5–15.5)
GLUCOSE SERPL-MCNC: 106 MG/DL (ref 74–99)
HCT VFR BLD AUTO: 39.1 % (ref 34–46)
HGB BLD-MCNC: 13.1 GM/DL (ref 11.4–16)
INR PPP: 0.9 (ref ?–1.2)
LYMPHOCYTES # SPEC AUTO: 2 K/UL (ref 1–4.8)
LYMPHOCYTES NFR SPEC AUTO: 24 %
MAGNESIUM SPEC-SCNC: 1.8 MG/DL (ref 1.6–2.3)
MCH RBC QN AUTO: 29.6 PG (ref 25–35)
MCHC RBC AUTO-ENTMCNC: 33.6 G/DL (ref 31–37)
MCV RBC AUTO: 88.1 FL (ref 80–100)
MONOCYTES # BLD AUTO: 0.5 K/UL (ref 0–1)
MONOCYTES NFR BLD AUTO: 5 %
NEUTROPHILS # BLD AUTO: 5.6 K/UL (ref 1.3–7.7)
NEUTROPHILS NFR BLD AUTO: 66 %
PLATELET # BLD AUTO: 330 K/UL (ref 150–450)
POTASSIUM SERPL-SCNC: 4.1 MMOL/L (ref 3.5–5.1)
PROT SERPL-MCNC: 6.8 G/DL (ref 6.3–8.2)
PT BLD: 9.6 SEC (ref 9–12)
SODIUM SERPL-SCNC: 138 MMOL/L (ref 137–145)
WBC # BLD AUTO: 8.5 K/UL (ref 3.8–10.6)

## 2020-07-21 PROCEDURE — 83735 ASSAY OF MAGNESIUM: CPT

## 2020-07-21 PROCEDURE — 82810 BLOOD GASES O2 SAT ONLY: CPT

## 2020-07-21 PROCEDURE — 80053 COMPREHEN METABOLIC PANEL: CPT

## 2020-07-21 PROCEDURE — 96375 TX/PRO/DX INJ NEW DRUG ADDON: CPT

## 2020-07-21 PROCEDURE — 71046 X-RAY EXAM CHEST 2 VIEWS: CPT

## 2020-07-21 PROCEDURE — 85610 PROTHROMBIN TIME: CPT

## 2020-07-21 PROCEDURE — 96376 TX/PRO/DX INJ SAME DRUG ADON: CPT

## 2020-07-21 PROCEDURE — 84484 ASSAY OF TROPONIN QUANT: CPT

## 2020-07-21 PROCEDURE — 36415 COLL VENOUS BLD VENIPUNCTURE: CPT

## 2020-07-21 PROCEDURE — 96365 THER/PROPH/DIAG IV INF INIT: CPT

## 2020-07-21 PROCEDURE — 80061 LIPID PANEL: CPT

## 2020-07-21 PROCEDURE — 85025 COMPLETE CBC W/AUTO DIFF WBC: CPT

## 2020-07-21 PROCEDURE — 85018 HEMOGLOBIN: CPT

## 2020-07-21 PROCEDURE — 93306 TTE W/DOPPLER COMPLETE: CPT

## 2020-07-21 PROCEDURE — 93005 ELECTROCARDIOGRAM TRACING: CPT

## 2020-07-21 PROCEDURE — 96366 THER/PROPH/DIAG IV INF ADDON: CPT

## 2020-07-21 PROCEDURE — 85730 THROMBOPLASTIN TIME PARTIAL: CPT

## 2020-07-21 PROCEDURE — 80048 BASIC METABOLIC PNL TOTAL CA: CPT

## 2020-07-21 PROCEDURE — 99291 CRITICAL CARE FIRST HOUR: CPT

## 2020-07-21 PROCEDURE — 93453 R&L HRT CATH W/VENTRICLGRPHY: CPT

## 2020-07-21 RX ADMIN — HEPARIN SODIUM SCH MLS/HR: 10000 INJECTION, SOLUTION INTRAVENOUS at 15:14

## 2020-07-21 RX ADMIN — NITROGLYCERIN SCH INCH: 20 OINTMENT TOPICAL at 18:21

## 2020-07-21 NOTE — XR
EXAMINATION TYPE: XR chest 2V

 

DATE OF EXAM: 7/21/2020

 

COMPARISON: 3/10/2020

 

HISTORY: 60-year-old female with chest pain

 

TECHNIQUE:  PA and lateral views

 

FINDINGS:  

Heart normal size. Aorta and pulmonary vasculature within normal limits. Mild interstitial prominence
 is unchanged. No consolidation or pleural effusion.

 

 

IMPRESSION:  

Chronic changes without acute cardiopulmonary process.

## 2020-07-21 NOTE — ED
General Adult HPI





- General


Chief complaint: Chest Pain


Stated complaint: Chest Pain


Time Seen by Provider: 20 13:50


Source: patient, RN notes reviewed, old records reviewed


Mode of arrival: wheelchair


Limitations: no limitations





- History of Present Illness


Initial comments: 





This is a 60-year-old female presents emergency Department with a past medical 

history significant for high blood pressure.  Patient states she also has a very

strong family history she has 6 siblings all of which have had heart disease and

she had a father with a heart attack at 42 and  at 55 over masses are 

detected.  Patient states today at work she had chest pain on the left side 

which radiated around to her scapula and down her left arm she became somewhat 

short of breath and it lasts approximately 5 minutes.  Patient states after that

she became very diaphoretic and eventually that subsided.  Patient denied any 

recent fever chills or cough per patient denies any symptoms currently.  Patient

denies headache patient denies numbness weakness.  Patient denies any abdominal 

pain patient has nausea vomiting diarrhea.





- Related Data


                                Home Medications











 Medication  Instructions  Recorded  Confirmed


 


Levothyroxine Sodium [Synthroid] 150 mcg PO DAILY 10/14/17 07/21/20


 


Ibuprofen [Advil] 200 - 400 mg PO Q6HR PRN 04/10/19 07/21/20


 


Magnesium 200 mg PO DAILY 04/10/19 07/21/20


 


ALPRAZolam [Xanax] 0.25 mg PO HS PRN 20











                                    Allergies











Allergy/AdvReac Type Severity Reaction Status Date / Time


 


hydrocodone AdvReac  SHORTNESS Verified 20 14:49





   OF BREATHE  


 


propoxyphene napsylate AdvReac  SHORTNESS Verified 20 14:49





[From Darvocet-N]   OF BREATHE  














Review of Systems


ROS Statement: 


Those systems with pertinent positive or pertinent negative responses have been 

documented in the HPI.





ROS Other: All systems not noted in ROS Statement are negative.





Past Medical History


Past Medical History: GERD/Reflux, Mitral Valve Prolapse (MVP), Pneumonia, 

Thyroid Disorder


Additional Past Medical History / Comment(s): mitral valve prolapse, hx. colon 

polyps, recent change in bowel habits, intermittent RLQ pain


History of Any Multi-Drug Resistant Organisms: None Reported


Past Surgical History: Appendectomy, Tubal Ligation


Additional Past Surgical History / Comment(s): parotid gland tumor removed, 

colonoscopies


Past Anesthesia/Blood Transfusion Reactions: No Reported Reaction, Family 

History of Problems w/ Anesthesia


Additional Past Anesthesia/Blood Transfusion Reaction / Comment(s): 16 y.o. son 

had some chest pain that was attributed to anesthesia per pt.


Past Psychological History: No Psychological Hx Reported


Smoking Status: Never smoker


Past Alcohol Use History: Occasional


Past Drug Use History: None Reported





- Past Family History


  ** Mother Sister(s)


Family Medical History: Cancer


Additional Family Medical History / Comment(s): mom colon, sister brain & breast

cancer





General Exam





- General Exam Comments


Initial Comments: 





GENERAL:


Patient is well-developed and well-nourished.  Patient is nontoxic and well-

hydrated and is in no acute distress.





ENT:


Neck is soft and supple.  No significant lymphadenopathy is noted.  Oropharynx 

is clear.  Moist mucous membranes.  Neck has full range of motion without 

eliciting any pain.  





EYES:


The sclera were anicteric and conjunctiva were pink and moist.  Extraocular 

movements were intact and pupils were equal round and reactive to light.  

Eyelids were unremarkable.





PULMONARY:


Unlabored respirations.  Good breath sounds bilaterally.  No audible rales 

rhonchi or wheezing was noted.





CARDIOVASCULAR:


There is a regular rate and rhythm without any murmurs gallops or rubs. 





ABDOMEN


Soft and nontender with normal bowel sounds.  





SKIN:


Skin is clear with no lesions or rashes and otherwise unremarkable.





NEUROLOGIC:


Patient is alert and oriented x3.  Cranial nerves II through XII are grossly 

intact.  Motor and sensory are also intact.  Normal speech, volume and content. 

Symmetrical smile.  





MUSCULOSKELETAL:


Normal extremities with adequate strength and full range of motion.  No lower 

extremity swelling or edema.  No calf tenderness.





LYMPHATICS:


No significant lymphadenopathy is noted





PSYCHIATRIC:


Normal psychiatric evaluation.  


Limitations: no limitations





Course


                                   Vital Signs











  20





  13:51 13:54 14:54


 


Temperature 98.4 F  


 


Pulse Rate 83 79 72


 


Respiratory 16 16 16





Rate   


 


Blood Pressure 174/92 182/79 169/71


 


O2 Sat by Pulse 98 100 99





Oximetry   














Medical Decision Making





- Medical Decision Making





EKG shows normal sinus rhythm at 83 bpm OH interval 244 QRS on a 42 QT intervals

436 QTC is 512.  Patient's EKG shows a left bundle branch block.





Chest x-ray shows no acute abnormality.





Patient was started on heparin for unstable angina.





Patient remained chest pain-free any throughout her stay in the emergency 

department.  I spoke with Dr. Dickerson he agreed to admit the patient admitted the 

patient I continue aspirin and Nitropaste and heparin on the floor.  I consult 

cardiology and I wrote admitting orders.





- Lab Data


Result diagrams: 


                                 20 14:44





                                 20 14:44


                                   Lab Results











  20 Range/Units





  14:44 14:44 14:44 


 


WBC  8.5    (3.8-10.6)  k/uL


 


RBC  4.44    (3.80-5.40)  m/uL


 


Hgb  13.1    (11.4-16.0)  gm/dL


 


Hct  39.1    (34.0-46.0)  %


 


MCV  88.1    (80.0-100.0)  fL


 


MCH  29.6    (25.0-35.0)  pg


 


MCHC  33.6    (31.0-37.0)  g/dL


 


RDW  13.6    (11.5-15.5)  %


 


Plt Count  330    (150-450)  k/uL


 


Neutrophils %  66    %


 


Lymphocytes %  24    %


 


Monocytes %  5    %


 


Eosinophils %  3    %


 


Basophils %  0    %


 


Neutrophils #  5.6    (1.3-7.7)  k/uL


 


Lymphocytes #  2.0    (1.0-4.8)  k/uL


 


Monocytes #  0.5    (0-1.0)  k/uL


 


Eosinophils #  0.2    (0-0.7)  k/uL


 


Basophils #  0.0    (0-0.2)  k/uL


 


PT   9.6   (9.0-12.0)  sec


 


INR   0.9   (<1.2)  


 


APTT   22.6   (22.0-30.0)  sec


 


Sodium    138  (137-145)  mmol/L


 


Potassium    4.1  (3.5-5.1)  mmol/L


 


Chloride    104  ()  mmol/L


 


Carbon Dioxide    25  (22-30)  mmol/L


 


Anion Gap    9  mmol/L


 


BUN    16  (7-17)  mg/dL


 


Creatinine    0.79  (0.52-1.04)  mg/dL


 


Est GFR (CKD-EPI)AfAm    >90  (>60 ml/min/1.73 sqM)  


 


Est GFR (CKD-EPI)NonAf    81  (>60 ml/min/1.73 sqM)  


 


Glucose    106 H  (74-99)  mg/dL


 


Calcium    9.4  (8.4-10.2)  mg/dL


 


Magnesium    1.8  (1.6-2.3)  mg/dL


 


Total Bilirubin    0.4  (0.2-1.3)  mg/dL


 


AST    22  (14-36)  U/L


 


ALT    16  (4-34)  U/L


 


Alkaline Phosphatase    80  ()  U/L


 


Troponin I     (0.000-0.034)  ng/mL


 


Total Protein    6.8  (6.3-8.2)  g/dL


 


Albumin    4.2  (3.5-5.0)  g/dL














  20 Range/Units





  14:44 


 


WBC   (3.8-10.6)  k/uL


 


RBC   (3.80-5.40)  m/uL


 


Hgb   (11.4-16.0)  gm/dL


 


Hct   (34.0-46.0)  %


 


MCV   (80.0-100.0)  fL


 


MCH   (25.0-35.0)  pg


 


MCHC   (31.0-37.0)  g/dL


 


RDW   (11.5-15.5)  %


 


Plt Count   (150-450)  k/uL


 


Neutrophils %   %


 


Lymphocytes %   %


 


Monocytes %   %


 


Eosinophils %   %


 


Basophils %   %


 


Neutrophils #   (1.3-7.7)  k/uL


 


Lymphocytes #   (1.0-4.8)  k/uL


 


Monocytes #   (0-1.0)  k/uL


 


Eosinophils #   (0-0.7)  k/uL


 


Basophils #   (0-0.2)  k/uL


 


PT   (9.0-12.0)  sec


 


INR   (<1.2)  


 


APTT   (22.0-30.0)  sec


 


Sodium   (137-145)  mmol/L


 


Potassium   (3.5-5.1)  mmol/L


 


Chloride   ()  mmol/L


 


Carbon Dioxide   (22-30)  mmol/L


 


Anion Gap   mmol/L


 


BUN   (7-17)  mg/dL


 


Creatinine   (0.52-1.04)  mg/dL


 


Est GFR (CKD-EPI)AfAm   (>60 ml/min/1.73 sqM)  


 


Est GFR (CKD-EPI)NonAf   (>60 ml/min/1.73 sqM)  


 


Glucose   (74-99)  mg/dL


 


Calcium   (8.4-10.2)  mg/dL


 


Magnesium   (1.6-2.3)  mg/dL


 


Total Bilirubin   (0.2-1.3)  mg/dL


 


AST   (14-36)  U/L


 


ALT   (4-34)  U/L


 


Alkaline Phosphatase   ()  U/L


 


Troponin I  <0.012  (0.000-0.034)  ng/mL


 


Total Protein   (6.3-8.2)  g/dL


 


Albumin   (3.5-5.0)  g/dL














Critical Care Time


Critical Care Time: Yes


Total Critical Care Time: 35





Disposition


Clinical Impression: 


 Unstable angina pectoris





Disposition: ADMITTED AS IP TO THIS HOSP


Referrals: 


Shahla Pedro MD [Primary Care Provider] - 1-2 days


Time of Disposition: 15:29

## 2020-07-21 NOTE — HP
HISTORY AND PHYSICAL



DATE OF SERVICE:

2020



CHIEF COMPLAINT:

Chest pain.



HISTORY OF PRESENT ILLNESS:

This 60-year-old woman with a past medical history of multiple medical problems,

including history of GERD, history of mitral valve prolapse, history pneumonia, 
history

of appendectomy, being followed by Dr. Pedro in the outpatient setting, was

complaining of discomfort on the left side of the chest with some arm tingling 
which

was radiating to the shoulder blades. The patient came to [facility] 
and was

admitted for further evaluation and treatment.  The patient had some shortness 
of

breath.  The episode lasted about 5 minutes.  The patient has also been having 
some

discomfort for the last several days, according to her.  The patient previously 
had a

stress test a few years ago which was negative.  Otherwise, the patient also had

siblings had heart disease and her father had a heart attack at the age of 42 
and 

at the age of 55.  There is no history of any fever, rigor or chills. No history
of

headache, loss of consciousness, seizures.



PAST MEDICAL HISTORY:

History of GERD, mitral valve prolapse, history of pneumonia, family history of

premature coronary artery disease.



HOME MEDICATIONS:

1. Magnesium 200 mg daily.

2. Synthroid 150 mcg p.o. daily.

3. Advil 200 to 400 q.6 p.r.n.

4. Xanax 0.25 at bedtime p.r.n.



ALLERGIES:

HYDROCODONE, DARVOCET.



FAMILY HISTORY:

Premature coronary artery disease and mom with colon cancer.  Sister with brain 
and

breast cancer.



SOCIAL HISTORY:

No history of smoking. No history of alcohol intake.



REVIEW OF SYSTEMS:

ENT: No diminished hearing. No diminished vision.

CARDIOVASCULAR SYSTEM: As mentioned earlier.

RESPIRATORY SYSTEM: As mentioned earlier.

GI: No nausea, vomiting.

: No dysuria or retention.

NERVOUS SYSTEM: No numbness, weakness.

ALLERGY/IMMUNOLOGY: No asthma, hayfever.

MUSCULOSKELETAL: As mentioned earlier.

HEMATOLOGY/ONCOLOGY: No history of anemia.

ENDOCRINE: No history of diabetes, hypothyroidism.

CONSTITUTIONAL: As mentioned earlier.

DERMATOLOGY: Negative.

RHEUMATOLOGY: Negative.

PSYCHIATRY: As mentioned earlier.



PHYSICAL EXAMINATION:

Patient alert and oriented x3. Pulse 65, blood pressure 154/72, respiration 18,

temperature 97.2, pulse ox 97% on room air.

HEENT: Conjunctivae normal.

NECK: No jugular venous distention.

CARDIOVASCULAR SYSTEM:  S1, S2 muffled.

RESPIRATORY SYSTEM: Breath sounds diminished at the bases.  No rhonchi. No 
crackles.

ABDOMEN: Soft, non-tender. No mass palpable.

LEGS: No edema. No swelling.

NERVOUS SYSTEM: Higher functions as mentioned earlier. Moves all 4 limbs. No 
focal

motor or sensory deficit.

LYMPHATICS: No lymph node palpable in neck, axillae or groin.

SKIN: No ulcer, rash, bleeding.

JOINTS: No active deforming arthropathy.



LABS:

CBC, BMP within normal limits.



EKG, personally reviewed, showed left bundle branch block.



ASSESSMENT:

1. Chest pain, possible unstable angina.

2. Left bundle branch block on EKG.

3. Family history of premature coronary artery disease.

4. Gastroesophageal reflux disease.

5. Mitral valve prolapse.

6. History of pneumonia.

7. Hypothyroidism.

8. History of mitral valve prolapse.

9. History of colonic polyps.

10.History of appendectomy.

11.Obesity with body mass index of 33.3.



RECOMMENDATIONS AND DISCUSSION:

In this 60-year-old woman who presented with multiple medical issues, at this 
time I

recommend to continue current medications, continue with symptomatic treatment. 
Resume

the home medications.  Rule out myocardial infarction.  Possible cardiac cath 
per

Cardiology.  Guarded prognosis because of multiple complex medical issues. 
Further

recommendations to follow. A copy of this dictation is being forwarded to Dr. Pedro, who is the primary physician.





GARRETT / ZULEIKA: [id number] / Job#: [id number]

MTDD

## 2020-07-22 LAB
ANION GAP SERPL CALC-SCNC: 5 MMOL/L
BASOPHILS # BLD AUTO: 0 K/UL (ref 0–0.2)
BASOPHILS NFR BLD AUTO: 1 %
BUN SERPL-SCNC: 15 MG/DL (ref 7–17)
CALCIUM SPEC-MCNC: 8.6 MG/DL (ref 8.4–10.2)
CHLORIDE SERPL-SCNC: 110 MMOL/L (ref 98–107)
CHOLEST SERPL-MCNC: 165 MG/DL (ref ?–200)
CO2 SERPL-SCNC: 24 MMOL/L (ref 22–30)
EOSINOPHIL # BLD AUTO: 0.3 K/UL (ref 0–0.7)
EOSINOPHIL NFR BLD AUTO: 4 %
ERYTHROCYTE [DISTWIDTH] IN BLOOD BY AUTOMATED COUNT: 4.23 M/UL (ref 3.8–5.4)
ERYTHROCYTE [DISTWIDTH] IN BLOOD: 13.6 % (ref 11.5–15.5)
GLUCOSE SERPL-MCNC: 96 MG/DL (ref 74–99)
HCT VFR BLD AUTO: 37.9 % (ref 34–46)
HDLC SERPL-MCNC: 69 MG/DL (ref 40–60)
HGB BLD-MCNC: 12.6 GM/DL (ref 11.4–16)
LDLC SERPL CALC-MCNC: 78 MG/DL (ref 0–99)
LYMPHOCYTES # SPEC AUTO: 2.2 K/UL (ref 1–4.8)
LYMPHOCYTES NFR SPEC AUTO: 32 %
MCH RBC QN AUTO: 29.7 PG (ref 25–35)
MCHC RBC AUTO-ENTMCNC: 33.2 G/DL (ref 31–37)
MCV RBC AUTO: 89.6 FL (ref 80–100)
MONOCYTES # BLD AUTO: 0.4 K/UL (ref 0–1)
MONOCYTES NFR BLD AUTO: 6 %
NEUTROPHILS # BLD AUTO: 3.8 K/UL (ref 1.3–7.7)
NEUTROPHILS NFR BLD AUTO: 56 %
PLATELET # BLD AUTO: 302 K/UL (ref 150–450)
POTASSIUM SERPL-SCNC: 4.3 MMOL/L (ref 3.5–5.1)
SODIUM SERPL-SCNC: 139 MMOL/L (ref 137–145)
TRIGL SERPL-MCNC: 92 MG/DL (ref ?–150)
WBC # BLD AUTO: 6.9 K/UL (ref 3.8–10.6)

## 2020-07-22 RX ADMIN — NITROGLYCERIN SCH INCH: 20 OINTMENT TOPICAL at 12:20

## 2020-07-22 RX ADMIN — LOSARTAN POTASSIUM SCH MG: 25 TABLET, FILM COATED ORAL at 12:20

## 2020-07-22 RX ADMIN — Medication SCH MG: at 08:54

## 2020-07-22 RX ADMIN — NITROGLYCERIN SCH INCH: 20 OINTMENT TOPICAL at 06:22

## 2020-07-22 RX ADMIN — NITROGLYCERIN SCH INCH: 20 OINTMENT TOPICAL at 00:27

## 2020-07-22 RX ADMIN — PANTOPRAZOLE SODIUM SCH MG: 40 TABLET, DELAYED RELEASE ORAL at 06:21

## 2020-07-22 RX ADMIN — LEVOTHYROXINE SODIUM SCH MCG: 75 TABLET ORAL at 06:21

## 2020-07-22 RX ADMIN — HEPARIN SODIUM SCH MLS/HR: 10000 INJECTION, SOLUTION INTRAVENOUS at 15:45

## 2020-07-22 RX ADMIN — NITROGLYCERIN SCH INCH: 20 OINTMENT TOPICAL at 18:46

## 2020-07-22 RX ADMIN — ASPIRIN 325 MG ORAL TABLET SCH MG: 325 PILL ORAL at 08:54

## 2020-07-22 NOTE — PN
PROGRESS NOTE



DATE OF SERVICE:

07/22/2020



This is a 60-year-old woman who was admitted with chest pain had possible unstable

angina.  The patient is scheduled to have cardiac assessment tomorrow by Cardiology.  A

2D echo with Doppler was done which showed ejection fraction 50%-55% was the LA was

severely dilated and moderate to severe mitral regurgitation noted as well as mild

tricuspid regurgitation as well.  No chest pain, no palpitation.



PHYSICAL EXAMINATION:

Alert and oriented x3.  Pulse 66, blood pressure 140/80, respiration 18, temperature

97.8, pulse ox 98% on room.

HEENT:  Conjunctivae normal.

NECK:  No jugular venous distension.

CARDIOVASCULAR:  S1, S2, muffled

RESPIRATION:  Breath sounds diminished at the bases, no rhonchi, no crackles.

ABDOMEN: Soft, nontender, no mass palpable.

LEGS:  No edema, no swelling.



LABS:

The chloride is 110.  CBC within normal limits and HCT 69.



ASSESSMENT:

1. Chest pain possible unstable angina.

2. Left bundle block in EKG.

3. Mitral regurgitation with LA dilatation on the 2D echo with normal ejection

    fraction.

4. Family history of premature coronary artery disease.

5. GERD.

6. Mitral valve prolapse history.

7. History of pneumonia.

8. History of hypothyroidism.

9. History of chronic polyps.

10.History of appendectomy.

11.History of obesity with body mass index of 33.3.

12.FULL CODE.



RECOMMENDATION:

In this 60-year-old woman who presented with multiple complex medical issues, will

monitor the patient closely, continue with the current management and symptomatic

treatment.  Otherwise will follow recommendation per Cardiology.  Prognosis guarded.

Resume the rest of medications.  Guarded prognosis.  Further recommendations to follow.





MMODL / IJN: [id number] / Job#: [id number]

## 2020-07-22 NOTE — P.CRDCN
History of Present Illness


History of present illness: 





HISTORY OF PRESENTING ILLNESS


This is a pleasant 60-year-old  female past medical history significant

for hypothyroidism and hypertension although she is not currently on antihypert

ensive therapy.  She does not follow regularly with cardiologist for any reason.

 She denies prior history of coronary artery disease however her father and all 

of her siblings have had premature coronary artery disease.  She underwent a 

stress echocardiogram in 2017 is negative for stress-induced ischemia.  Of note 

at that time her EKG revealed sinus mechanism.  She presented to the emergency 

department with symptoms of cramping in the left precordial region that wrapped 

around the left scapula and radiated down the left arm.  It was associated with 

diaphoresis and mild shortness of breath.  She works at a medical facility and 

her blood pressure wish checked.  According to the patient her blood pressure 

was over 200 systolic.  On arrival to the emergency department blood pressure 

was 174/92.  She was given a dose of IV labetalol the emergency department.  EKG

revealed left bundle branch block pattern.  There is no old evidence of a left 

bundle in the past.  Chest x-ray is negative for an acute cardiopulmonary 

process.  Laboratory data reviewed, CBC unremarkable, sodium 138, potassium 4.3,

creatinine 0.74, cardiac enzymes negative 3, LDL 78 and HDL 69.  She currently 

takes no daily cardiac medications. She states her PCP has attempted lisinopril 

and lopressor in the past. Lisinopril caused a cough and lopressor dropped her 

blood pressure too much. 





REVIEW OF SYSTEMS


At the time of my exam:


CONSTITUTIONAL: Denies fever or chills.


CARDIOVASCULAR: Denies chest pain, shortness of breath, orthopnea, PND or 

palpitations.


RESPIRATORY: Denies cough. 


GASTROINTESTINAL: Denies abdominal pain, diarrhea, constipation, nausea or 

vomiting.


MUSCULOSKELETAL: Denies myalgias.


NEUROLOGIC: Denies numbness, tingling or weakness.


ENDOCRINE: Denies fatigue, weight change,  polydipsia or polyurina.


GENITOURINARY: Denies burning, hematuria or urgency with micturation.


HEMATOLOGIC: Denies history of anemia or bleeding. 





PHYSICAL EXAMINATION


Blood pressure 148/81 heart rate 66 afebrile and maintaining oxygen saturation 

on room air.


CONSTITUTIONAL: No apparent distress. 


HEENT: Head is normocephalic. Pupils are equal, round. Sclerae anicteric. Mucous

membranes of the mouth are moist.  No JVD. No carotid bruit.


CHEST EXAMINATION: Lungs are clear to auscultation. No chest wall tenderness is 

noted on palpation or with deep breathing. 


HEART EXAMINATION: Regular rate and rhythm. S1, S2 heard.  Systolic ejection 

murmur at the left sternal border, no gallops or rub.


ABDOMEN: Soft, nontender. Positive bowel sounds.


EXTREMITIES: 2+ peripheral pulses, no lower extremity edema and no calf 

tenderness.


NEUROLOGIC EXAMINATION: Patient is awake, alert and oriented x3. 





ASSESSMENT


Chest pain


Left bundle branch block, new since 2017


Significant family history of premature coronary artery disease





PLAN


Echocardiogram being performed at the bedside during our evaluation.  Mitral 

regurgitation appears moderate.


Given her abnormal EKG, significant family history, abnormal mitral valve and 

symptoms we recommend proceeding with right and left cardiac catheterization to 

assess for underlying coronary artery disease and mitral valve. I have discussed

the risks, benefits and alternative therapies for the above-mentioned procedure 

and for both sedation/analgesia as well as necessary blood product 

administration, if indicated, as they pertain to this patient.  The patient has 

indicated understanding and acceptance of the risks and procedures discussed.  

Questions have been answered appropriately and she is agreeable to move forward 

with the above-stated procedure.


Initiate losartan 25 mg daily.


Further recommendations to follow based upon clinical course.


Thank you kindly for this consultation.





Nurse Practitioner note has been reviewed, I agree with a documented findings 

and plan of care.  Patient was seen and examined.











Past Medical History


Past Medical History: GERD/Reflux, Mitral Valve Prolapse (MVP), Pneumonia, 

Thyroid Disorder


Additional Past Medical History / Comment(s): mitral valve prolapse, hx. colon 

polyps, recent change in bowel habits, intermittent RLQ pain


History of Any Multi-Drug Resistant Organisms: None Reported


Past Surgical History: Appendectomy, Tubal Ligation


Additional Past Surgical History / Comment(s): parotid gland tumor removed, 

colonoscopies, fallopian tubes removed, ovaries removed 2008


Past Anesthesia/Blood Transfusion Reactions: No Reported Reaction, Family 

History of Problems w/ Anesthesia


Additional Past Anesthesia/Blood Transfusion Reaction / Comment(s): 16 y.o. son 

had some chest pain that was attributed to anesthesia per pt.


Past Psychological History: No Psychological Hx Reported


Smoking Status: Never smoker


Past Alcohol Use History: Occasional


Past Drug Use History: None Reported





- Past Family History


  ** Mother Sister(s)


Family Medical History: Cancer


Additional Family Medical History / Comment(s): mom colon, sister brain & breast

cancer





Medications and Allergies


                                Home Medications











 Medication  Instructions  Recorded  Confirmed  Type


 


Levothyroxine Sodium [Synthroid] 150 mcg PO DAILY 10/14/17 07/21/20 History


 


Ibuprofen [Advil] 200 - 400 mg PO Q6HR PRN 04/10/19 07/21/20 History


 


Magnesium 200 mg PO DAILY 04/10/19 07/21/20 History


 


ALPRAZolam [Xanax] 0.25 mg PO HS PRN 07/21/20 07/21/20 History








                                    Allergies











Allergy/AdvReac Type Severity Reaction Status Date / Time


 


hydrocodone AdvReac  SHORTNESS Verified 07/21/20 14:49





   OF BREATHE  


 


propoxyphene napsylate AdvReac  SHORTNESS Verified 07/21/20 14:49





[facility]   OF BREATHE  














Physical Exam


Vitals: 


                                   Vital Signs











  Temp Pulse Pulse Resp BP BP Pulse Ox


 


 07/22/20 06:20       158/73 


 


 07/22/20 02:15  97.8 F   73  17   137/73  94 L


 


 07/22/20 00:25       138/74 


 


 07/21/20 20:10  97.8 F   62  18   164/84  98


 


 07/21/20 17:35  97.7 F   68  18   159/84  97


 


 07/21/20 16:26  97.8 F  65   18  154/72   97


 


 07/21/20 16:00   65    157/99   97


 


 07/21/20 15:48   71   16  162/72   97


 


 07/21/20 15:40   64   16  194/78   96


 


 07/21/20 14:54   72   16  169/71   99


 


 07/21/20 13:54   79   16  182/79   100


 


 07/21/20 13:51  98.4 F  83   16  174/92   98








                                Intake and Output











 07/21/20 07/22/20 07/22/20





 22:59 06:59 14:59


 


Intake Total 72.68 72.134 


 


Balance 72.68 72.134 


 


Intake:   


 


  Intake, IV Titration 72.68 72.134 





  Amount   


 


    Heparin Sod,Pork in 0.45% 72.68 72.134 





    NaCl 25,000 unit In 0.45   





    % NaCl 1 250ml.bag @ 11   





    UNITS/KG/HR 9.979 mls/hr   





    IV .Q24H [facility] Rx#:   





    [id number]   


 


Other:   


 


  Voiding Method Toilet Toilet 


 


  Weight 90.718 kg  














Results





                                 07/22/20 03:38





                                 07/22/20 03:38


                                 Cardiac Enzymes











  07/21/20 07/21/20 07/21/20 Range/Units





  14:44 14:44 17:39 


 


AST  22    (14-36)  U/L


 


Troponin I   <0.012  <0.012  (0.000-0.034)  ng/mL














  07/21/20 Range/Units





  21:14 


 


AST   (14-36)  U/L


 


Troponin I  <0.012  (0.000-0.034)  ng/mL








                                   Coagulation











  07/21/20 07/21/20 07/22/20 Range/Units





  14:44 21:14 03:38 


 


PT  9.6    (9.0-12.0)  sec


 


APTT  22.6  36.9 H  55.8 H  (22.0-30.0)  sec








                                     Lipids











  07/22/20 Range/Units





  03:38 


 


Triglycerides  92  (<150)  mg/dL


 


Cholesterol  165  (<200)  mg/dL


 


HDL Cholesterol  69 H  (40-60)  mg/dL








                                       CBC











  07/21/20 07/22/20 Range/Units





  14:44 03:38 


 


WBC  8.5  6.9  (3.8-10.6)  k/uL


 


RBC  4.44  4.23  (3.80-5.40)  m/uL


 


Hgb  13.1  12.6  (11.4-16.0)  gm/dL


 


Hct  39.1  37.9  (34.0-46.0)  %


 


Plt Count  330  302  (150-450)  k/uL








                          Comprehensive Metabolic Panel











  07/21/20 07/22/20 Range/Units





  14:44 03:38 


 


Sodium  138  139  (137-145)  mmol/L


 


Potassium  4.1  4.3  (3.5-5.1)  mmol/L


 


Chloride  104  110 H  ()  mmol/L


 


Carbon Dioxide  25  24  (22-30)  mmol/L


 


BUN  16  15  (7-17)  mg/dL


 


Creatinine  0.79  0.74  (0.52-1.04)  mg/dL


 


Glucose  106 H  96  (74-99)  mg/dL


 


Calcium  9.4  8.6  (8.4-10.2)  mg/dL


 


AST  22   (14-36)  U/L


 


ALT  16   (4-34)  U/L


 


Alkaline Phosphatase  80   ()  U/L


 


Total Protein  6.8   (6.3-8.2)  g/dL


 


Albumin  4.2   (3.5-5.0)  g/dL








                               Current Medications











Generic Name Dose Route Start Last Admin





  Trade Name Freq  PRN Reason Stop Dose Admin


 


Alprazolam  0.25 mg  07/21/20 16:44 





  Xanax  PO  





  HS PRN  





  Anxiety  


 


Alprazolam  0.25 mg  07/21/20 16:45 





  Xanax  PO  





  TID PRN  





  Anxiety  


 


Aspirin  325 mg  07/22/20 09:00 





  Aspirin  PO  





  DAILY [facility]  


 


Heparin Sodium (Porcine)  0 unit  07/21/20 22:03  07/21/20 22:32





  Heparin  IV   2,267 unit





  PER PROTOCOL PRN   Administration





  Low PTT  





  Protocol  


 


Heparin Sodium/Sodium Chloride  250 mls @ 9.979 mls/hr  07/21/20 15:00  07/22/20

04:38





  25,000 unit/ Sodium Chloride  IV   13 units/kg/hr





  .Q24H TERRA   11.793 mls/hr





    Titration





  Protocol  





  11 UNITS/KG/HR  


 


Ibuprofen  200 mg  07/21/20 16:44 





  Advil  PO  





  Q6HR PRN  





  Pain  


 


Levothyroxine Sodium  150 mcg  07/22/20 06:30  07/22/20 06:21





  Synthroid  PO   150 mcg





  DAILY@0630 TERRA   Administration


 


Magnesium Oxide  200 mg  07/22/20 09:00 





  Mag-Ox  PO  





  DAILY [facility]  


 


Nitroglycerin  0.4 mg  07/21/20 16:08 





  Nitrostat  SUBLINGUAL  





  Q5M PRN  





  Chest Pain  


 


Nitroglycerin  1 inch  07/21/20 18:00  07/22/20 06:22





  Nitro-Bid Oint  TOPICAL   1 inch





  Q6HR TERRA   Administration


 


Pantoprazole Sodium  40 mg  07/22/20 07:30  07/22/20 06:21





  Protonix  PO   40 mg





  AC-BRKFST [facility]   Administration


 


Temazepam  15 mg  07/21/20 16:45 





  Restoril  PO  





  HS PRN  





  Insomnia  








                                Intake and Output











 07/21/20 07/22/20 07/22/20





 22:59 06:59 14:59


 


Intake Total 72.68 72.134 


 


Balance 72.68 72.134 


 


Intake:   


 


  Intake, IV Titration 72.68 72.134 





  Amount   


 


    Heparin Sod,Pork in 0.45% 72.68 72.134 





    NaCl 25,000 unit In 0.45   





    % NaCl 1 250ml.bag @ 11   





    UNITS/KG/HR 9.979 mls/hr   





    IV .Q24H [facility] Rx#:   





    [id number]   


 


Other:   


 


  Voiding Method Toilet Toilet 


 


  Weight 90.718 kg  








                                        





                                 07/22/20 03:38 





                                 07/22/20 03:38

## 2020-07-22 NOTE — ECHOF
Referral Reason:cp



MEASUREMENTS

--------

HEIGHT: 165.1 cm

WEIGHT: 90.7 kg

BP: 

RVIDd:   3.0 cm     (< 3.3)

IVSd:   1.3 cm     (0.6 - 1.1)

LVIDd:   4.9 cm     (3.9 - 5.3)

LVPWd:   1.1 cm     (0.6 - 1.1)

IVSs:   1.5 cm

LVIDs:   3.8 cm

LVPWs:   1.6 cm

LA Diam:   4.8 cm     (2.7 - 3.8)

LAESV Index (A-L):   45.56 ml/m

Ao Diam:   2.7 cm     (2.0 - 3.7)

AV Cusp:   2.2 cm     (1.5 - 2.6)

LA Diam:   4.1 cm     (2.7 - 3.8)

MV EXCURSION:   18.395 mm     (> 18.000)

MV EF SLOPE:   88 mm/s     (70 - 150)

EPSS:   0.7 cm

MV E Louis:   1.11 m/s

MV DecT:   145 ms

MV A Louis:   1.03 m/s

MV E/A Ratio:   1.08 

RAP:   5.00 mmHg

RVSP:   60.34 mmHg







FINDINGS

--------

Sinus rhythm.

This was a technically good study.

The left ventricular size is normal.   Left ventricular wall thickness is normal.   Overall left vent
ricular systolic function is low-normal with, an EF between 50 - 55 %.

The right ventricle is normal in size.

The left atrium is markedly dilated.   LA is severely dilated >40 ml/m2

The right atrial size is normal.

The aortic valve is trileaflet, and appears structurally normal. No aortic stenosis or regurgitation.


The mitral valve leaflets are mildly thickened.   Moderate-to-severe mitral regurgitation is present.


Mild tricuspid regurgitation present.   There is moderate pulmonary hypertension.   The right ventric
ular systolic pressure, as measured by Doppler, is 60.34mmHg.

Trace/mild (physiologic)  pulmonic regurgitation.

The aortic root size is normal.

There is no pericardial effusion.



CONCLUSIONS

--------

1. Sinus rhythm.

2. This was a technically good study.

3. The left ventricular size is normal.

4. Left ventricular wall thickness is normal.

5. Overall left ventricular systolic function is low-normal with, an EF between 50 - 55 %.

6. The right ventricle is normal in size.

7. The left atrium is markedly dilated.

8. LA is severely dilated >40 ml/m2

9. The right atrial size is normal.

10. The mitral valve leaflets are mildly thickened.

11. Moderate-to-severe mitral regurgitation is present.

12. Mild tricuspid regurgitation present.

13. There is moderate pulmonary hypertension.

14. The right ventricular systolic pressure, as measured by Doppler, is 60.34mmHg.

15. Trace/mild (physiologic)  pulmonic regurgitation.

16. The aortic root size is normal.

17. There is no pericardial effusion.





SONOGRAPHER: Yadi Mckay RDCS

## 2020-07-23 VITALS — HEART RATE: 69 BPM | TEMPERATURE: 97.8 F | SYSTOLIC BLOOD PRESSURE: 137 MMHG | DIASTOLIC BLOOD PRESSURE: 79 MMHG

## 2020-07-23 VITALS — RESPIRATION RATE: 14 BRPM

## 2020-07-23 LAB
ANION GAP SERPL CALC-SCNC: 7 MMOL/L
BASOPHILS # BLD AUTO: 0.1 K/UL (ref 0–0.2)
BASOPHILS NFR BLD AUTO: 1 %
BUN SERPL-SCNC: 15 MG/DL (ref 7–17)
CALCIUM SPEC-MCNC: 8.9 MG/DL (ref 8.4–10.2)
CHLORIDE SERPL-SCNC: 109 MMOL/L (ref 98–107)
CO2 SERPL-SCNC: 23 MMOL/L (ref 22–30)
EOSINOPHIL # BLD AUTO: 0.3 K/UL (ref 0–0.7)
EOSINOPHIL NFR BLD AUTO: 5 %
ERYTHROCYTE [DISTWIDTH] IN BLOOD BY AUTOMATED COUNT: 4.65 M/UL (ref 3.8–5.4)
ERYTHROCYTE [DISTWIDTH] IN BLOOD: 13.6 % (ref 11.5–15.5)
GLUCOSE BLD-MCNC: 96 MG/DL (ref 75–99)
GLUCOSE SERPL-MCNC: 106 MG/DL (ref 74–99)
HCT VFR BLD AUTO: 41.5 % (ref 34–46)
HGB BLD-MCNC: 13.6 GM/DL (ref 11.4–16)
LYMPHOCYTES # SPEC AUTO: 1.9 K/UL (ref 1–4.8)
LYMPHOCYTES NFR SPEC AUTO: 27 %
MCH RBC QN AUTO: 29.3 PG (ref 25–35)
MCHC RBC AUTO-ENTMCNC: 32.8 G/DL (ref 31–37)
MCV RBC AUTO: 89.3 FL (ref 80–100)
MONOCYTES # BLD AUTO: 0.3 K/UL (ref 0–1)
MONOCYTES NFR BLD AUTO: 5 %
NEUTROPHILS # BLD AUTO: 4.3 K/UL (ref 1.3–7.7)
NEUTROPHILS NFR BLD AUTO: 61 %
PLATELET # BLD AUTO: 355 K/UL (ref 150–450)
POTASSIUM SERPL-SCNC: 4.4 MMOL/L (ref 3.5–5.1)
SAO2 % BLDA: 71.8 %
SAO2 % BLDA: 72.6 %
SAO2 % BLDA: 95.5 %
SODIUM SERPL-SCNC: 139 MMOL/L (ref 137–145)
WBC # BLD AUTO: 7.1 K/UL (ref 3.8–10.6)

## 2020-07-23 RX ADMIN — Medication SCH MG: at 06:10

## 2020-07-23 RX ADMIN — NITROGLYCERIN SCH: 20 OINTMENT TOPICAL at 14:33

## 2020-07-23 RX ADMIN — ASPIRIN 325 MG ORAL TABLET SCH MG: 325 PILL ORAL at 06:09

## 2020-07-23 RX ADMIN — PANTOPRAZOLE SODIUM SCH MG: 40 TABLET, DELAYED RELEASE ORAL at 06:10

## 2020-07-23 RX ADMIN — LEVOTHYROXINE SODIUM SCH MCG: 75 TABLET ORAL at 06:09

## 2020-07-23 RX ADMIN — NITROGLYCERIN SCH: 20 OINTMENT TOPICAL at 06:10

## 2020-07-23 RX ADMIN — LOSARTAN POTASSIUM SCH MG: 25 TABLET, FILM COATED ORAL at 06:09

## 2020-07-23 RX ADMIN — NITROGLYCERIN SCH: 20 OINTMENT TOPICAL at 00:27

## 2020-07-23 NOTE — P.CARDCATH
Date of Procedure: 07/23/20


Preoperative Diagnosis: 


This is a 60-year-old female with history of hypertension and also mitral 

regurgitation and strong family history of ischemic heart disease is admitted to

the hospital with recurrent chest pains.  Her EKG showed a new left bundle-

branch block pattern.  An echocardiogram showed evidence of moderate to severe 

mitral regurgitation.  Patient is advised to have a cardiac catheterization for 

definitive diagnosis.  Patient was explained the risks and benefits of the 

procedure


Postoperative Diagnosis: 


Normal coronary arteries.  Normal LV function.  Moderate mitral regurgitation.  

Moderate pulmonary hypertension.  Diastolic dysfunction


Operative Findings: 


HISTORY: This is a 60-year-old female with history of hypertension and mitral 

regurgitation is admitted to the hospital with chest pain and new lead detected 

a left bundle-branch block.  Echo Cardizem showed moderate to severe mitral 

regurgitation.  Patient is advised to have cardiac catheterization for 

definitive diagnosis





CONSENT:I have discussed the risks, benefits and alternative therapies for the 

above-mentioned procedure and for both sedation/analgesia as well as necessary 

blood product administration, if indicated, as they pertain to this patient.  

The patient has indicated understanding and acceptance of the risks and 

procedures discussed.











PROCEDURE: Patient was brought to the lab in a fasting state.  Patient was given

some IV sedation.  The right groin is infiltrated with lidocaine .








Right heart catheterization: This was performed from the right femoral approach.

 The right femoral vein was entered using Seldinger technique and a 7-[place] 

sheath was left in place.  A 6-[place] Ovalo-Lam catheter was used for right 

heart catheterization.  Patient tolerated procedure well.  Hemostasis was 

obtained with manual compression.





Left heart catheterization: Right femoral artery was entered using Seldinger 

technique.  A 6-[place] catheter was left in place and selective coronary 

arteriography and left ventriculography was performed.  Patient tolerated the 

procedure well.  Femoral angiogram was performed and Angio-Seal was applied for 

hemostasis.  No immediate complications were noted and patient was transferred 

to [facility] in a stable condition





Conscious Sedation: Versed 1mg


Fentanyl 50 g


Duration 41minutes   


HEMODYNAMICS: Aortic pressure is about 160/80.  Left ventricle end-diastolic 

pressure is about 20.  There was no gradient across the aortic valve.





                        The right atrial pressure is about 5, right ventricular 

pressure is about 40/5, pulmonary artery pressure is about 40/20, the pulmonary 

wedge pressure is about 15 with a V-wave of 25-30.  The cardiac output by 

thermodilution method is 5.17 and by Jaime method 4.75.  Oxygen saturation is a 

71.8, right atrium 72.6 and the pulmonary artery and 95.5 from the right femoral

artery





SELECTIVE CORONARY ARTERIOGRAPHY: 


                          LEFT MAIN: Normal length and less than occlusive 

disease


                          THE LEFT ANTERIOR DESCENDING CORONARY ARTERY:.  

Moderate caliber vessel giving rise to small septal and diagonal branches are 

free of any occlusive disease


                          THE LEFT CIRCUMFLEX AND IS CORONARY ARTERY: Moderate 

caliber vessel giving rise to good-sized OM branch.


                          THE RIGHT CORONARY ARTERY: Codominant vessel.  A 

moderate in caliber, free of occlusive disease





      





LEFT VENTRICULOGRAPHY:.  This revealed normal-sized cardiac silhouette with good

systolic function.  The end-diastolic pressure is elevated size to diastolic 

dysfunction





FINAL IMPRESSION: #1.  Normal coronary arteries #2.  Diastolic dysfunction #3.  

Moderate mitral regurgitation #4.  Moderate pulmonary hypertension





PLAN:.  Continue medical therapy and risk factor modification





PROGNOSIS: Guarded

## 2020-07-24 NOTE — DS
DISCHARGE SUMMARY



FINAL DIAGNOSES:

1. Chest pain, possibly musculoskeletal.

2. Negative cardiac catheterization.

3. Left bundle branch block on EKG.

4. Mitral regurgitation with LA dilatation on 2-D echo with normal ejection fraction.

5. Family history of premature coronary artery disease.

6. Gastroesophageal reflux disease.

7. Mitral valve prolapse history.

8. History of pneumonia.

9. History of hypothyroidism.

10.History of colonic polyps.

11.History of appendectomy.

12.History of obesity with body mass index of 33.3.

13.FULL CODE.



DISCHARGE DISPOSITION:

The patient will be discharged in stable condition with guarded prognosis. Discharge

cleared by Cardiology.



HISTORY OF PRESENT ILLNESS:

This 60-year-old woman with a past medical history of multiple medical problems

followed by Dr. Pedro in the outpatient setting admitted with chest pain.

Myocardial infarction ruled out. The patient had history of premature coronary artery

disease.  Patient had cardiac catheterization which showed normal coronaries.  Patient

was treated medically. Medications were adjusted.



On exam, vitals are stable.

CARDIOVASCULAR: S1, S2 normal.

ABDOMEN: Soft.

NERVOUS SYSTEM: No focal deficits.



DISCHARGE ADVICE:

1. Diet is cardiac.

2. Activity limited until followup.

3. Follow up with Dr. Pedro in 2 to 3 days.

4. Follow up with Dr. Menjivar in 1 week.

Medications are:

1. Advil p.r.n.

2. Magnesium 200 mg p.o. daily.

3. Synthroid 150 mcg p.o. daily.

4. Xanax 0.25 at bedtime.

5. Cozaar 50 mg p.o. daily.

6. Ecotrin 81 mg p.o. daily.

7. HydroDIURIL 12.5 mg p.o. daily.

8. Norvasc 2.5 mg daily.

Medications to be adjusted in the outpatient setting.



Once again the patient will be discharged in stable condition with guarded prognosis.





MMODL / IJN: [id number] / Job#: [id number]

## 2020-12-04 ENCOUNTER — HOSPITAL ENCOUNTER (OUTPATIENT)
Dept: HOSPITAL 47 - RADXRMAIN | Age: 60
Discharge: HOME | End: 2020-12-04
Attending: FAMILY MEDICINE
Payer: MEDICAID

## 2020-12-04 DIAGNOSIS — R07.81: Primary | ICD-10-CM

## 2020-12-04 PROCEDURE — 71046 X-RAY EXAM CHEST 2 VIEWS: CPT

## 2020-12-04 NOTE — XR
EXAMINATION TYPE: XR chest 2V

 

DATE OF EXAM: 12/4/2020

 

COMPARISON: R chest x-ray 7/21/2020

 

HISTORY: Pleurodynia, rib pain

 

TECHNIQUE:  Frontal and lateral views of the chest are obtained.

 

FINDINGS:  There is no focal air space opacity, pleural effusion, or pneumothorax seen.  The cardiac 
silhouette size is within normal limits. There is eventration elevation of right hemidiaphragm as on 
prior. Aorta is dense.  The osseous structures are intact.

 

IMPRESSION:  No acute cardiopulmonary process.

## 2021-02-12 ENCOUNTER — HOSPITAL ENCOUNTER (OUTPATIENT)
Dept: HOSPITAL 47 - RADXRMAIN | Age: 61
Discharge: HOME | End: 2021-02-12
Attending: FAMILY MEDICINE
Payer: MEDICAID

## 2021-02-12 DIAGNOSIS — M25.532: Primary | ICD-10-CM

## 2021-02-12 NOTE — XR
EXAMINATION TYPE: XR wrist complete LT

 

DATE OF EXAM: 2/12/2021

 

COMPARISON: NONE

 

HISTORY: Pain

 

TECHNIQUE: Four views submitted.

 

FINDINGS:

The osseous structures are intact.  The joint spaces are preserved and there is no acute fracture or 
dislocation.  

 

IMPRESSION:

1.  No definite acute fracture or dislocation if symptoms persist, follow-up study in 7 to 10 days wo
uld be suggested

## 2021-08-23 ENCOUNTER — HOSPITAL ENCOUNTER (OUTPATIENT)
Dept: HOSPITAL 47 - LABWHC1 | Age: 61
Discharge: HOME | End: 2021-08-23
Attending: EMERGENCY MEDICINE
Payer: MEDICAID

## 2021-08-23 DIAGNOSIS — Z20.822: Primary | ICD-10-CM

## 2021-08-23 PROCEDURE — 87635 SARS-COV-2 COVID-19 AMP PRB: CPT

## 2021-08-24 ENCOUNTER — HOSPITAL ENCOUNTER (OUTPATIENT)
Dept: HOSPITAL 47 - LABWHC1 | Age: 61
Discharge: HOME | End: 2021-08-24
Attending: EMERGENCY MEDICINE
Payer: MEDICAID

## 2021-08-24 DIAGNOSIS — Z20.822: Primary | ICD-10-CM

## 2021-08-24 PROCEDURE — 87635 SARS-COV-2 COVID-19 AMP PRB: CPT

## 2021-09-26 ENCOUNTER — HOSPITAL ENCOUNTER (OUTPATIENT)
Dept: HOSPITAL 47 - LABWHC1 | Age: 61
Discharge: HOME | End: 2021-09-26
Attending: EMERGENCY MEDICINE
Payer: MEDICAID

## 2021-09-26 DIAGNOSIS — Z53.9: Primary | ICD-10-CM

## 2021-09-27 ENCOUNTER — HOSPITAL ENCOUNTER (OUTPATIENT)
Dept: HOSPITAL 47 - LABWHC1 | Age: 61
Discharge: HOME | End: 2021-09-27
Attending: EMERGENCY MEDICINE
Payer: MEDICAID

## 2021-09-27 DIAGNOSIS — Z20.822: Primary | ICD-10-CM

## 2021-09-27 PROCEDURE — 87635 SARS-COV-2 COVID-19 AMP PRB: CPT

## 2021-10-08 ENCOUNTER — HOSPITAL ENCOUNTER (OUTPATIENT)
Dept: HOSPITAL 47 - LABWHC1 | Age: 61
Discharge: HOME | End: 2021-10-08
Attending: FAMILY MEDICINE
Payer: MEDICAID

## 2021-10-08 DIAGNOSIS — I10: Primary | ICD-10-CM

## 2021-10-08 DIAGNOSIS — R53.83: ICD-10-CM

## 2021-10-08 DIAGNOSIS — E03.9: ICD-10-CM

## 2021-10-08 DIAGNOSIS — F32.9: ICD-10-CM

## 2021-10-08 LAB
ALBUMIN SERPL-MCNC: 4.1 G/DL (ref 3.8–4.9)
ALBUMIN/GLOB SERPL: 1.81 G/DL (ref 1.6–3.17)
ALP SERPL-CCNC: 85 U/L (ref 41–126)
ALT SERPL-CCNC: 22 U/L (ref 8–44)
ANION GAP SERPL CALC-SCNC: 10.7 MMOL/L (ref 4–12)
AST SERPL-CCNC: 19 U/L (ref 13–35)
BASOPHILS # BLD AUTO: 0.1 X 10*3/UL (ref 0–0.1)
BASOPHILS NFR BLD AUTO: 1.4 %
BUN SERPL-SCNC: 16.1 MG/DL (ref 9–27)
BUN/CREAT SERPL: 21.47 RATIO (ref 12–20)
CALCIUM SPEC-MCNC: 9 MG/DL (ref 8.7–10.3)
CHLORIDE SERPL-SCNC: 108 MMOL/L (ref 96–109)
CHOLEST SERPL-MCNC: 172 MG/DL (ref 0–200)
CO2 SERPL-SCNC: 23.1 MMOL/L (ref 21.6–31.8)
EOSINOPHIL # BLD AUTO: 0.36 X 10*3/UL (ref 0.04–0.35)
EOSINOPHIL NFR BLD AUTO: 5 %
ERYTHROCYTE [DISTWIDTH] IN BLOOD BY AUTOMATED COUNT: 4.53 X 10*6/UL (ref 4.1–5.2)
ERYTHROCYTE [DISTWIDTH] IN BLOOD: 13.8 % (ref 11.5–14.5)
GLOBULIN SER CALC-MCNC: 2.3 G/DL (ref 1.6–3.3)
GLUCOSE SERPL-MCNC: 94 MG/DL (ref 70–110)
HCT VFR BLD AUTO: 39.9 % (ref 37.2–46.3)
HDLC SERPL-MCNC: 59.1 MG/DL (ref 40–60)
HGB BLD-MCNC: 12.6 G/DL (ref 12–15)
LDLC SERPL CALC-MCNC: 93.9 MG/DL (ref 0–131)
LYMPHOCYTES # SPEC AUTO: 1.75 X 10*3/UL (ref 0.9–5)
LYMPHOCYTES NFR SPEC AUTO: 24.5 %
MCH RBC QN AUTO: 27.8 PG (ref 27–32)
MCHC RBC AUTO-ENTMCNC: 31.6 G/DL (ref 32–37)
MCV RBC AUTO: 88.1 FL (ref 80–97)
MONOCYTES # BLD AUTO: 0.53 X 10*3/UL (ref 0.2–1)
MONOCYTES NFR BLD AUTO: 7.4 %
NEUTROPHILS # BLD AUTO: 4.38 X 10*3/UL (ref 1.8–7.7)
NEUTROPHILS NFR BLD AUTO: 61.3 %
PLATELET # BLD AUTO: 351 X 10*3/UL (ref 140–440)
POTASSIUM SERPL-SCNC: 4.4 MMOL/L (ref 3.5–5.5)
PROT SERPL-MCNC: 6.4 G/DL (ref 6.2–8.2)
SODIUM SERPL-SCNC: 141 MMOL/L (ref 135–145)
T4 FREE SERPL-MCNC: 1.61 NG/DL (ref 0.8–1.8)
TRIGL SERPL-MCNC: 94.8 MG/DL (ref 0–149)
VLDLC SERPL CALC-MCNC: 18.96 MG/DL (ref 5–40)
WBC # BLD AUTO: 7.15 X 10*3/UL (ref 4.5–10)

## 2021-10-08 PROCEDURE — 85025 COMPLETE CBC W/AUTO DIFF WBC: CPT

## 2021-10-08 PROCEDURE — 84443 ASSAY THYROID STIM HORMONE: CPT

## 2021-10-08 PROCEDURE — 82306 VITAMIN D 25 HYDROXY: CPT

## 2021-10-08 PROCEDURE — 80053 COMPREHEN METABOLIC PANEL: CPT

## 2021-10-08 PROCEDURE — 80061 LIPID PANEL: CPT

## 2021-10-08 PROCEDURE — 36415 COLL VENOUS BLD VENIPUNCTURE: CPT

## 2021-10-08 PROCEDURE — 84439 ASSAY OF FREE THYROXINE: CPT

## 2021-11-24 ENCOUNTER — HOSPITAL ENCOUNTER (OUTPATIENT)
Dept: HOSPITAL 47 - LABWHC1 | Age: 61
Discharge: HOME | End: 2021-11-24
Attending: EMERGENCY MEDICINE
Payer: MEDICAID

## 2021-11-24 DIAGNOSIS — Z20.822: Primary | ICD-10-CM

## 2021-11-24 PROCEDURE — 87635 SARS-COV-2 COVID-19 AMP PRB: CPT

## 2021-11-27 ENCOUNTER — HOSPITAL ENCOUNTER (OUTPATIENT)
Dept: HOSPITAL 47 - LABWHC1 | Age: 61
Discharge: HOME | End: 2021-11-27
Attending: EMERGENCY MEDICINE
Payer: MEDICAID

## 2021-11-27 DIAGNOSIS — Z20.822: Primary | ICD-10-CM

## 2021-11-27 PROCEDURE — 87635 SARS-COV-2 COVID-19 AMP PRB: CPT

## 2022-03-25 ENCOUNTER — HOSPITAL ENCOUNTER (OUTPATIENT)
Dept: HOSPITAL 47 - RADMAMWWP | Age: 62
Discharge: HOME | End: 2022-03-25
Attending: FAMILY MEDICINE
Payer: MEDICAID

## 2022-03-25 DIAGNOSIS — E03.9: ICD-10-CM

## 2022-03-25 DIAGNOSIS — Z80.3: ICD-10-CM

## 2022-03-25 DIAGNOSIS — Z00.00: Primary | ICD-10-CM

## 2022-03-25 DIAGNOSIS — Z78.0: ICD-10-CM

## 2022-03-25 DIAGNOSIS — Z12.31: ICD-10-CM

## 2022-03-25 LAB
ALBUMIN SERPL-MCNC: 4.6 G/DL (ref 3.8–4.9)
ALBUMIN/GLOB SERPL: 1.74 G/DL (ref 1.6–3.17)
ALP SERPL-CCNC: 86 U/L (ref 41–126)
ALT SERPL-CCNC: 21 U/L (ref 8–44)
ANION GAP SERPL CALC-SCNC: 14.7 MMOL/L (ref 10–18)
AST SERPL-CCNC: 17 U/L (ref 13–35)
BASOPHILS # BLD AUTO: 0.07 X 10*3/UL (ref 0–0.1)
BASOPHILS NFR BLD AUTO: 0.9 %
BUN SERPL-SCNC: 17.7 MG/DL (ref 9–27)
BUN/CREAT SERPL: 19.84 RATIO (ref 12–20)
CALCIUM SPEC-MCNC: 9.7 MG/DL (ref 8.7–10.3)
CHLORIDE SERPL-SCNC: 102 MMOL/L (ref 96–109)
CHOLEST SERPL-MCNC: 196 MG/DL (ref 0–200)
CO2 SERPL-SCNC: 24.5 MMOL/L (ref 20–27.5)
EOSINOPHIL # BLD AUTO: 0.18 X 10*3/UL (ref 0.04–0.35)
EOSINOPHIL NFR BLD AUTO: 2.3 %
ERYTHROCYTE [DISTWIDTH] IN BLOOD BY AUTOMATED COUNT: 5 X 10*6/UL (ref 4.1–5.2)
ERYTHROCYTE [DISTWIDTH] IN BLOOD: 13.8 % (ref 11.5–14.5)
GLOBULIN SER CALC-MCNC: 2.7 G/DL (ref 1.6–3.3)
GLUCOSE SERPL-MCNC: 93 MG/DL (ref 70–110)
HCT VFR BLD AUTO: 44.2 % (ref 37.2–46.3)
HDLC SERPL-MCNC: 61.1 MG/DL (ref 40–60)
HGB BLD-MCNC: 14.1 G/DL (ref 12–15)
IMM GRANULOCYTES BLD QL AUTO: 0.4 %
LDLC SERPL CALC-MCNC: 106.1 MG/DL (ref 0–131)
LYMPHOCYTES # SPEC AUTO: 1.84 X 10*3/UL (ref 0.9–5)
LYMPHOCYTES NFR SPEC AUTO: 23.3 %
MCH RBC QN AUTO: 28.2 PG (ref 27–32)
MCHC RBC AUTO-ENTMCNC: 31.9 G/DL (ref 32–37)
MCV RBC AUTO: 88.4 FL (ref 80–97)
MONOCYTES # BLD AUTO: 0.62 X 10*3/UL (ref 0.2–1)
MONOCYTES NFR BLD AUTO: 7.9 %
NEUTROPHILS # BLD AUTO: 5.15 X 10*3/UL (ref 1.8–7.7)
NEUTROPHILS NFR BLD AUTO: 65.2 %
NRBC BLD AUTO-RTO: 0 /100 WBCS (ref 0–0)
PLATELET # BLD AUTO: 425 X 10*3/UL (ref 140–440)
POTASSIUM SERPL-SCNC: 4.4 MMOL/L (ref 3.5–5.5)
PROT SERPL-MCNC: 7.3 G/DL (ref 6.2–8.2)
SODIUM SERPL-SCNC: 141 MMOL/L (ref 135–145)
T4 FREE SERPL-MCNC: 1.77 NG/DL (ref 0.8–1.8)
TRIGL SERPL-MCNC: 144 MG/DL (ref 0–149)
VLDLC SERPL CALC-MCNC: 28.8 MG/DL (ref 5–40)
WBC # BLD AUTO: 7.89 X 10*3/UL (ref 4.5–10)

## 2022-03-25 PROCEDURE — 80053 COMPREHEN METABOLIC PANEL: CPT

## 2022-03-25 PROCEDURE — 85025 COMPLETE CBC W/AUTO DIFF WBC: CPT

## 2022-03-25 PROCEDURE — 77063 BREAST TOMOSYNTHESIS BI: CPT

## 2022-03-25 PROCEDURE — 84439 ASSAY OF FREE THYROXINE: CPT

## 2022-03-25 PROCEDURE — 80061 LIPID PANEL: CPT

## 2022-03-25 PROCEDURE — 84443 ASSAY THYROID STIM HORMONE: CPT

## 2022-03-25 PROCEDURE — 77067 SCR MAMMO BI INCL CAD: CPT

## 2022-03-25 PROCEDURE — 36415 COLL VENOUS BLD VENIPUNCTURE: CPT

## 2022-03-29 NOTE — MM
Reason for exam: screening  (asymptomatic).

Last mammogram was performed 3 years and 9 months ago.



History:

Patient is postmenopausal.

Family history of breast cancer in sister at age 48.



Physical Findings:

A clinical breast exam by your physician is recommended on an annual basis and 

results should be correlated with mammographic findings.



MG 3D Screening Mammo W/Cad

Bilateral CC and MLO view(s) were taken.

Prior study comparison: Melania 15, 2018, bilateral MG 3d screening mammo w/cad.

There are scattered fibroglandular densities.  There is no discrete abnormality.





ASSESSMENT: Negative, BI-RAD 1



RECOMMENDATION:

Routine screening mammogram of both breasts in 1 year.

## 2022-04-12 ENCOUNTER — HOSPITAL ENCOUNTER (OUTPATIENT)
Dept: HOSPITAL 47 - RADXRMAIN | Age: 62
End: 2022-04-12
Attending: INTERNAL MEDICINE
Payer: MEDICAID

## 2022-04-12 DIAGNOSIS — I50.9: Primary | ICD-10-CM

## 2022-04-12 DIAGNOSIS — R05.9: ICD-10-CM

## 2022-04-12 PROCEDURE — 71046 X-RAY EXAM CHEST 2 VIEWS: CPT

## 2022-04-12 PROCEDURE — 83880 ASSAY OF NATRIURETIC PEPTIDE: CPT

## 2022-04-12 NOTE — XR
EXAMINATION TYPE: XR chest 2V

 

DATE OF EXAM: 4/12/2022

 

COMPARISON: 12/4/2020

 

TECHNIQUE: PA and lateral views submitted.

 

HISTORY: Shortness of breath

 

FINDINGS:

The lungs are clear and  there is no pneumothorax, pleural effusion, or focal pneumonia.  Biapical pl
eural thickening. Arthropathy of the shoulders. Atherosclerotic change of the aorta.

 

IMPRESSION: 

1. No acute process.

## 2022-04-19 ENCOUNTER — HOSPITAL ENCOUNTER (OUTPATIENT)
Dept: HOSPITAL 47 - CATHCVL | Age: 62
Discharge: HOME | End: 2022-04-19
Attending: INTERNAL MEDICINE
Payer: MEDICAID

## 2022-04-19 VITALS — RESPIRATION RATE: 18 BRPM | TEMPERATURE: 98.8 F

## 2022-04-19 VITALS — SYSTOLIC BLOOD PRESSURE: 151 MMHG | HEART RATE: 64 BPM | DIASTOLIC BLOOD PRESSURE: 72 MMHG

## 2022-04-19 VITALS — BODY MASS INDEX: 34.7 KG/M2

## 2022-04-19 DIAGNOSIS — Z82.49: ICD-10-CM

## 2022-04-19 DIAGNOSIS — I34.0: Primary | ICD-10-CM

## 2022-04-19 DIAGNOSIS — E03.9: ICD-10-CM

## 2022-04-19 DIAGNOSIS — Z79.890: ICD-10-CM

## 2022-04-19 DIAGNOSIS — Z79.899: ICD-10-CM

## 2022-04-19 DIAGNOSIS — Z20.822: ICD-10-CM

## 2022-04-19 DIAGNOSIS — Z88.5: ICD-10-CM

## 2022-04-19 DIAGNOSIS — I10: ICD-10-CM

## 2022-04-19 PROCEDURE — 87635 SARS-COV-2 COVID-19 AMP PRB: CPT

## 2022-04-19 PROCEDURE — 93312 ECHO TRANSESOPHAGEAL: CPT

## 2022-04-19 PROCEDURE — 93320 DOPPLER ECHO COMPLETE: CPT

## 2022-04-19 PROCEDURE — 93325 DOPPLER ECHO COLOR FLOW MAPG: CPT

## 2022-04-19 NOTE — P.TEE
Indications for Procedure(s): 


Assessment mitral regurgitation


Date of Procedure: 04/19/22


Preoperative Diagnosis: 


Shortness of breath, mitral regurgitation


Postoperative Diagnosis: 


3+ mitral regurgitation which is central


Procedure(s) Performed: 


JALYN under brief general anesthesia


Description of Procedure(s): 





INDICATION: 62-year-old female with history of mitral regurgitation has been 

experiencing exertional shortness of breath.  Transthoracic echo is consistent 

with moderate to severe mitral regurgitation is advised to have JALYN examination 

for further evaluation 





CONSENT: .  Informed consent was obtained from the patient





PROCEDURE: Patient was brought to the lab in a fasting state.  She was prepped 

and draped in the usual fashion.  Department of anesthesia provided brief 

general anesthesia.  The throat was sprayed with Cetacaine.  A lubricated Omni 

probe was introduced in the oropharynx and was advanced into the esophagus and 

stomach.  Multiple views were obtained.  Color, pulsed and continuous flow 

Doppler studies were done.  Saline contrast bubble injection is performed.  

Patient tolerated the procedure well





FINDINGS: .  The aortic valve is tricuspid and function normally with trace 

regurgitation.  The mitral valve structurally appear to be normal without any 

definite prolapse or ruptured.  There is at central mitral regurgitation with a 

PISA value of about 0.7.  There appears to be reversal of flow in the left upper

pulmonary vein.  The left atrial appendage free of any clot.  The Taxol appeared

to be normal with mild regurgitation.  The interatrial septum is intact without 

any shunt.  Saline contrast bubble injection did not show any shunting of 

bubbles.  Left atrial appendage is free of any clot.  There is biatrial 

enlargement, left atrium appeared to be more so.  The left ventricle function 

appeared to be normal.  Aorta showed minimal plaque





IMPRESSION: #1.  Moderate to severe mitral regurgitation which appears to be 

about 3+.  There is reversal of flow in the left upper pulmonary vein.  #2.  

Trace aortic regurgitation.  #3.  No clot in the left atrial appendage.  #4.  No

PFO #5.  Left atrial enlargement.  #6.  Preserved LV function





PLAN: Continue medical therapy.  Possible mitral valve repair

## 2022-04-29 ENCOUNTER — HOSPITAL ENCOUNTER (OUTPATIENT)
Dept: HOSPITAL 47 - ORWHC2ENDO | Age: 62
Discharge: HOME | End: 2022-04-29
Attending: INTERNAL MEDICINE
Payer: MEDICAID

## 2022-04-29 VITALS — SYSTOLIC BLOOD PRESSURE: 141 MMHG | RESPIRATION RATE: 17 BRPM | HEART RATE: 60 BPM | DIASTOLIC BLOOD PRESSURE: 61 MMHG

## 2022-04-29 VITALS — TEMPERATURE: 97.2 F

## 2022-04-29 VITALS — BODY MASS INDEX: 33.6 KG/M2

## 2022-04-29 DIAGNOSIS — R19.4: ICD-10-CM

## 2022-04-29 DIAGNOSIS — Z86.010: ICD-10-CM

## 2022-04-29 DIAGNOSIS — Z98.51: ICD-10-CM

## 2022-04-29 DIAGNOSIS — Z98.890: ICD-10-CM

## 2022-04-29 DIAGNOSIS — K29.50: ICD-10-CM

## 2022-04-29 DIAGNOSIS — Z79.899: ICD-10-CM

## 2022-04-29 DIAGNOSIS — I34.1: ICD-10-CM

## 2022-04-29 DIAGNOSIS — Z90.49: ICD-10-CM

## 2022-04-29 DIAGNOSIS — K21.9: Primary | ICD-10-CM

## 2022-04-29 DIAGNOSIS — I34.0: ICD-10-CM

## 2022-04-29 DIAGNOSIS — E07.9: ICD-10-CM

## 2022-04-29 DIAGNOSIS — Z79.890: ICD-10-CM

## 2022-04-29 DIAGNOSIS — K44.9: ICD-10-CM

## 2022-04-29 DIAGNOSIS — Z88.8: ICD-10-CM

## 2022-04-29 DIAGNOSIS — Z91.048: ICD-10-CM

## 2022-04-29 PROCEDURE — 43239 EGD BIOPSY SINGLE/MULTIPLE: CPT

## 2022-04-29 PROCEDURE — 88305 TISSUE EXAM BY PATHOLOGIST: CPT

## 2022-04-29 PROCEDURE — 45378 DIAGNOSTIC COLONOSCOPY: CPT

## 2022-04-29 NOTE — P.PCN
Date of Procedure: 04/29/22


Procedure(s) Performed: 


Brief history:


Patient is a pleasant 62-year-old pleasant white female scheduled for an 

elective upper endoscopy as well as colonoscopy as a part of evaluation of 

GERD/intermittent nausea vomiting and change in bowel habits for the last 1 year

duration.  She also has prior history of colon polyps.





Procedure performed:


Esophagogastroduodenoscopy with biopsy


Colonoscopy





Preoperative diagnosis:


GERD/intermittent nausea vomiting


Change in bowel habits and history of colon polyps





Anesthesia: MAC





Procedure:


After informed consent was obtained from the patient  was brought into the 

endoscopy unit and IV  sedation was administered by anesthesia under continuous 

monitoring.  Initially upper endoscopy was done.  The Olympus  video 

endoscope was inserted inserted into the mouth and esophagus intubated without 

any difficulty and was gradually advanced into the stomach and duodenum and 

carefully examined.  The bulb and second part of the duodenum appeared normal.  

Abscesses were done from the duodenum to rule out celiac disease.  The scope was

then withdrawn into the stomach adequately insufflated with air and upon careful

examination  the antrum and mild antral gastritis and biopsies were done from 

this area.  The  body, cardia and fundus appeared normal.  The scope was then 

withdrawn into the esophagus.  The GE junction was located at 40 cm to the 

incisors.  It appeared regular with no erythema erosions or ulcerations.  Rest 

of the esophagus appeared normal.  Patient tolerated the procedure well.





At this time the patient continued to remain sedation.  Initial digital rectal 

examination was normal.  Olympus  video colonoscope was then inserted into

the rectum and gradually advanced to the cecum without any difficulty.  Careful 

examination was performed as the scope was gradually being withdrawn.  The prep 

was excellent.  The cecum, ascending colon, transverse colon, descending colon, 

sigmoid colon and rectum appeared normal.  Retroflexion was performed in the 

rectum and no lesions were noted.  Patient tolerated the procedure well.





Impression:


1.  Upper endoscopy revealed small hiatal hernia and mild antral gastritis


2. colonoscopy was within normal limits with no evidence of colitis or 

colorectal neoplasia








Recommendations:


Findings of this examination were discussed with the patient as well as her 

family.  She was advised to follow with the biopsy results.  She can have a 

repeat screening colonoscopy in 5 years.

## 2022-05-24 ENCOUNTER — HOSPITAL ENCOUNTER (OUTPATIENT)
Dept: HOSPITAL 47 - LABWHC1 | Age: 62
Discharge: HOME | End: 2022-05-24
Attending: THORACIC SURGERY (CARDIOTHORACIC VASCULAR SURGERY)
Payer: MEDICAID

## 2022-05-24 DIAGNOSIS — I34.9: Primary | ICD-10-CM

## 2022-05-24 LAB
ALBUMIN SERPL-MCNC: 4.1 G/DL (ref 3.8–4.9)
ALBUMIN/GLOB SERPL: 1.64 G/DL (ref 1.6–3.17)
ALP SERPL-CCNC: 77 U/L (ref 41–126)
ALT SERPL-CCNC: 23 U/L (ref 8–44)
ANION GAP SERPL CALC-SCNC: 9.7 MMOL/L (ref 10–18)
APTT BLD: 22.6 SEC (ref 22–30)
AST SERPL-CCNC: 19 U/L (ref 13–35)
BUN SERPL-SCNC: 14.7 MG/DL (ref 9–27)
BUN/CREAT SERPL: 16.33 RATIO (ref 12–20)
CALCIUM SPEC-MCNC: 9.1 MG/DL (ref 8.7–10.3)
CHLORIDE SERPL-SCNC: 106 MMOL/L (ref 96–109)
CHOLEST SERPL-MCNC: 166 MG/DL (ref 0–200)
CO2 SERPL-SCNC: 24.3 MMOL/L (ref 20–27.5)
ERYTHROCYTE [DISTWIDTH] IN BLOOD BY AUTOMATED COUNT: 4.47 X 10*6/UL (ref 4.1–5.2)
ERYTHROCYTE [DISTWIDTH] IN BLOOD: 13.8 % (ref 11.5–14.5)
GLOBULIN SER CALC-MCNC: 2.5 G/DL (ref 1.6–3.3)
GLUCOSE SERPL-MCNC: 92 MG/DL (ref 70–110)
HCT VFR BLD AUTO: 39.7 % (ref 37.2–46.3)
HDLC SERPL-MCNC: 63.7 MG/DL (ref 40–60)
HGB BLD-MCNC: 12.8 G/DL (ref 12–15)
INR PPP: 0.9 (ref ?–1.2)
LDLC SERPL CALC-MCNC: 81.3 MG/DL (ref 0–131)
MAGNESIUM SPEC-SCNC: 2.1 MG/DL (ref 1.5–2.4)
MCH RBC QN AUTO: 28.6 PG (ref 27–32)
MCHC RBC AUTO-ENTMCNC: 32.2 G/DL (ref 32–37)
MCV RBC AUTO: 88.8 FL (ref 80–97)
NRBC BLD AUTO-RTO: 0 /100 WBCS (ref 0–0)
PH UR: 7.5 [PH] (ref 5–8)
PLATELET # BLD AUTO: 351 X 10*3/UL (ref 140–440)
POTASSIUM SERPL-SCNC: 4.5 MMOL/L (ref 3.5–5.5)
PROT SERPL-MCNC: 6.6 G/DL (ref 6.2–8.2)
PT BLD: 9.7 SEC (ref 9–12)
SODIUM SERPL-SCNC: 140 MMOL/L (ref 135–145)
SP GR UR: 1 (ref 1–1.03)
TRIGL SERPL-MCNC: 105 MG/DL (ref 0–149)
UROBILINOGEN UR QL: 0.2
VLDLC SERPL CALC-MCNC: 21 MG/DL (ref 5–40)
WBC # BLD AUTO: 6.51 X 10*3/UL (ref 4.5–10)

## 2022-05-24 PROCEDURE — 36415 COLL VENOUS BLD VENIPUNCTURE: CPT

## 2022-05-24 PROCEDURE — 85027 COMPLETE CBC AUTOMATED: CPT

## 2022-05-24 PROCEDURE — 85730 THROMBOPLASTIN TIME PARTIAL: CPT

## 2022-05-24 PROCEDURE — 80061 LIPID PANEL: CPT

## 2022-05-24 PROCEDURE — 93880 EXTRACRANIAL BILAT STUDY: CPT

## 2022-05-24 PROCEDURE — 87086 URINE CULTURE/COLONY COUNT: CPT

## 2022-05-24 PROCEDURE — 80053 COMPREHEN METABOLIC PANEL: CPT

## 2022-05-24 PROCEDURE — 83735 ASSAY OF MAGNESIUM: CPT

## 2022-05-24 PROCEDURE — 93005 ELECTROCARDIOGRAM TRACING: CPT

## 2022-05-24 PROCEDURE — 80074 ACUTE HEPATITIS PANEL: CPT

## 2022-05-24 PROCEDURE — 87070 CULTURE OTHR SPECIMN AEROBIC: CPT

## 2022-05-24 PROCEDURE — 85610 PROTHROMBIN TIME: CPT

## 2022-05-24 PROCEDURE — 84439 ASSAY OF FREE THYROXINE: CPT

## 2022-05-24 PROCEDURE — 83036 HEMOGLOBIN GLYCOSYLATED A1C: CPT

## 2022-05-24 PROCEDURE — 81003 URINALYSIS AUTO W/O SCOPE: CPT

## 2022-05-24 PROCEDURE — 84443 ASSAY THYROID STIM HORMONE: CPT

## 2022-05-24 PROCEDURE — 71046 X-RAY EXAM CHEST 2 VIEWS: CPT

## 2022-05-24 NOTE — P.PN
Progress Note - Text


Progress Note Date: 05/24/22





A 5 m walk test was completed with the patient, time 1: 1.93 seconds, time 2: 

1.75 seconds, time 3: 2.07 seconds.


An STS risk score was calculated in discussed with the patient.

## 2022-05-24 NOTE — XR
EXAMINATION TYPE: XR chest 2V

 

DATE OF EXAM: 5/24/2022

 

COMPARISON: Chest x-ray 4/12/2022

 

HISTORY: Preop, shortness of breath

 

TECHNIQUE:  Frontal and lateral views of the chest are obtained.

 

FINDINGS:  There is no focal air space opacity, pleural effusion, or pneumothorax seen.  The cardiac 
silhouette size is within normal limits. The aorta is dense.  The osseous structures are intact, ther
e is thoracic spondylosis. Stable eventration of the right hemidiaphragm noted.

 

IMPRESSION:  No acute cardiopulmonary process.

## 2022-05-24 NOTE — US
EXAMINATION TYPE: US carotid duplex BILAT

 

DATE OF EXAM: 5/24/2022

 

COMPARISON: NONE

 

CLINICAL HISTORY: OPEN HEART, PRE OP SURGERY. mitral valve replacement, no h/o stroke

 

EXAM MEASUREMENTS: 

 

RIGHT:  Peak Systolic Velocity (PSV) cm/sec

----- Right CCA:  67.7  

----- Right ICA:  104.4     

----- Right ECA:  98.2   

ICA/CCA ratio:  1.5    

 

RIGHT:  End Diastole cm/sec

----- Right CCA:  22.3   

----- Right ICA:  35.8      

----- Right ECA:  5.8     

 

LEFT:  Peak Systolic Velocity (PSV) cm/sec

----- Left CCA:  82.3  

----- Left ICA:  149.1   

----- Left ECA:  68.6  

ICA/CCA ratio:  1.8  

 

LEFT:  End Diastole cm/sec

----- Left CCA:  21.2  

----- Left ICA:  47.3   

----- Left ECA:  14.5 

 

VERTEBRALS (direction of flow):

Right Vertebral: Antegrade

Left Vertebral: Antegrade

 

Rhythm:  Normal

 

Mild heterogeneous plaque bilateral bulbs with no significant stenosis seen

 

Grayscale, color Doppler, spectral Doppler imaging performed of the carotid arteries. Waveform analys
is does not show significant stenosis of the internal carotid arteries.

 

IMPRESSION:  No hemodynamic significant stenosis of the proximal internal carotid arteries by Doppler
 criteria, an indirect measurement of carotid stenosis. Mild elevation of peak systolic velocity note
d in the left internal carotid artery on the left without elevated ICA to CCA ratio, consider follow-
up   

 

 

Criteria for Assigning % of Stenosis / Diameter reduction

(Estimation based on the indirect measurements of the internal carotid artery velocities (ICA PSV).

1.  Normal (no stenosis)=ICA PSV < 125 cm/s: ratio < 2.0: ICA EDV<40 cm/s.

2. Less than 50% stenosis=ICA PSV < 125 cm/s: ratio < 2.0: ICA EDV<40 cm/s.

3.  50 to 69% stenosis=ICA PSV of 125 to 230 cm/s: ration 2.0 ? 4.0: ICA EDV  cm/s.

4.  Greater than 70% stenosis to near occlusion= ICA PSV > 230 cm/s: ratio > 4.0: ICA EDV > 100 cm/s.
 

5.  Near occlusion= ICA PSV velocities may be low or undetectable: variable ratio and ICA EDV.

6.  Total occlusion=unable to detect flow.

## 2022-05-27 ENCOUNTER — HOSPITAL ENCOUNTER (INPATIENT)
Dept: HOSPITAL 47 - 2ORMAIN | Age: 62
LOS: 5 days | Discharge: HOME HEALTH | DRG: 220 | End: 2022-06-01
Attending: THORACIC SURGERY (CARDIOTHORACIC VASCULAR SURGERY) | Admitting: THORACIC SURGERY (CARDIOTHORACIC VASCULAR SURGERY)
Payer: MEDICAID

## 2022-05-27 VITALS — BODY MASS INDEX: 36.1 KG/M2

## 2022-05-27 DIAGNOSIS — Z88.8: ICD-10-CM

## 2022-05-27 DIAGNOSIS — Z88.5: ICD-10-CM

## 2022-05-27 DIAGNOSIS — Z22.321: ICD-10-CM

## 2022-05-27 DIAGNOSIS — Z98.890: ICD-10-CM

## 2022-05-27 DIAGNOSIS — E05.90: ICD-10-CM

## 2022-05-27 DIAGNOSIS — I34.0: Primary | ICD-10-CM

## 2022-05-27 DIAGNOSIS — Z90.722: ICD-10-CM

## 2022-05-27 DIAGNOSIS — Z90.79: ICD-10-CM

## 2022-05-27 DIAGNOSIS — Z90.49: ICD-10-CM

## 2022-05-27 DIAGNOSIS — Z86.010: ICD-10-CM

## 2022-05-27 DIAGNOSIS — K21.9: ICD-10-CM

## 2022-05-27 DIAGNOSIS — E78.5: ICD-10-CM

## 2022-05-27 DIAGNOSIS — Z98.51: ICD-10-CM

## 2022-05-27 DIAGNOSIS — M19.90: ICD-10-CM

## 2022-05-27 DIAGNOSIS — Z87.19: ICD-10-CM

## 2022-05-27 DIAGNOSIS — J98.11: ICD-10-CM

## 2022-05-27 DIAGNOSIS — F41.0: ICD-10-CM

## 2022-05-27 DIAGNOSIS — Z79.890: ICD-10-CM

## 2022-05-27 DIAGNOSIS — D72.829: ICD-10-CM

## 2022-05-27 DIAGNOSIS — Z80.3: ICD-10-CM

## 2022-05-27 DIAGNOSIS — I10: ICD-10-CM

## 2022-05-27 DIAGNOSIS — Z82.49: ICD-10-CM

## 2022-05-27 DIAGNOSIS — Z80.8: ICD-10-CM

## 2022-05-27 DIAGNOSIS — Z87.01: ICD-10-CM

## 2022-05-27 DIAGNOSIS — Z79.899: ICD-10-CM

## 2022-05-27 DIAGNOSIS — E03.9: ICD-10-CM

## 2022-05-27 DIAGNOSIS — Z80.0: ICD-10-CM

## 2022-05-27 DIAGNOSIS — E87.1: ICD-10-CM

## 2022-05-27 DIAGNOSIS — D62: ICD-10-CM

## 2022-05-27 DIAGNOSIS — Z71.3: ICD-10-CM

## 2022-05-27 DIAGNOSIS — I48.0: ICD-10-CM

## 2022-05-27 LAB
ALBUMIN SERPL-MCNC: 2.9 G/DL (ref 3.5–5)
ALP SERPL-CCNC: 50 U/L (ref 38–126)
ALT SERPL-CCNC: 18 U/L (ref 4–34)
ANION GAP SERPL CALC-SCNC: 6 MMOL/L
APTT BLD: 24.7 SEC (ref 22–30)
AST SERPL-CCNC: 43 U/L (ref 14–36)
BASOPHILS # BLD AUTO: 0 K/UL (ref 0–0.2)
BASOPHILS # BLD AUTO: 0.1 K/UL (ref 0–0.2)
BASOPHILS # BLD AUTO: 0.1 K/UL (ref 0–0.2)
BASOPHILS NFR BLD AUTO: 0 %
BASOPHILS NFR BLD AUTO: 0 %
BASOPHILS NFR BLD AUTO: 1 %
BUN SERPL-SCNC: 16 MG/DL (ref 7–17)
CA-I BLDA-MCNC: 3.8 MG/DL (ref 4.5–5.3)
CA-I BLDA-MCNC: 4.1 MG/DL (ref 4.5–5.3)
CA-I BLDA-MCNC: 4.5 MG/DL (ref 4.5–5.3)
CA-I BLDA-MCNC: 4.6 MG/DL (ref 4.5–5.3)
CA-I BLDA-MCNC: 5.3 MG/DL (ref 4.5–5.3)
CALCIUM SPEC-MCNC: 8.2 MG/DL (ref 8.4–10.2)
CHLORIDE SERPL-SCNC: 109 MMOL/L (ref 98–107)
CO2 BLDA-SCNC: 23 MMOL/L (ref 19–24)
CO2 BLDA-SCNC: 24 MMOL/L (ref 19–24)
CO2 BLDA-SCNC: 26 MMOL/L (ref 19–24)
CO2 BLDA-SCNC: 26 MMOL/L (ref 19–24)
CO2 SERPL-SCNC: 22 MMOL/L (ref 22–30)
EOSINOPHIL # BLD AUTO: 0 K/UL (ref 0–0.7)
EOSINOPHIL # BLD AUTO: 0.1 K/UL (ref 0–0.7)
EOSINOPHIL # BLD AUTO: 0.1 K/UL (ref 0–0.7)
EOSINOPHIL NFR BLD AUTO: 0 %
EOSINOPHIL NFR BLD AUTO: 1 %
EOSINOPHIL NFR BLD AUTO: 1 %
ERYTHROCYTE [DISTWIDTH] IN BLOOD BY AUTOMATED COUNT: 3.79 M/UL (ref 3.8–5.4)
ERYTHROCYTE [DISTWIDTH] IN BLOOD BY AUTOMATED COUNT: 3.83 M/UL (ref 3.8–5.4)
ERYTHROCYTE [DISTWIDTH] IN BLOOD BY AUTOMATED COUNT: 3.93 M/UL (ref 3.8–5.4)
ERYTHROCYTE [DISTWIDTH] IN BLOOD: 13.3 % (ref 11.5–15.5)
ERYTHROCYTE [DISTWIDTH] IN BLOOD: 13.3 % (ref 11.5–15.5)
ERYTHROCYTE [DISTWIDTH] IN BLOOD: 14 % (ref 11.5–15.5)
GLUCOSE BLD-MCNC: 113 MG/DL (ref 75–99)
GLUCOSE BLD-MCNC: 117 MG/DL (ref 75–99)
GLUCOSE BLD-MCNC: 122 MG/DL (ref 75–99)
GLUCOSE BLD-MCNC: 125 MG/DL (ref 75–99)
GLUCOSE BLD-MCNC: 126 MG/DL (ref 75–99)
GLUCOSE BLD-MCNC: 128 MG/DL (ref 75–99)
GLUCOSE BLD-MCNC: 129 MG/DL (ref 75–99)
GLUCOSE BLD-MCNC: 130 MG/DL (ref 75–99)
GLUCOSE BLD-MCNC: 130 MG/DL (ref 75–99)
GLUCOSE BLD-MCNC: 140 MG/DL (ref 75–99)
GLUCOSE BLD-MCNC: 144 MG/DL (ref 75–99)
GLUCOSE BLD-MCNC: 92 MG/DL (ref 75–99)
GLUCOSE BLDA-MCNC: 103 MG/DL (ref 75–99)
GLUCOSE BLDA-MCNC: 104 MG/DL (ref 75–99)
GLUCOSE BLDA-MCNC: 104 MG/DL (ref 75–99)
GLUCOSE BLDA-MCNC: 121 MG/DL (ref 75–99)
GLUCOSE BLDA-MCNC: 98 MG/DL (ref 75–99)
GLUCOSE SERPL-MCNC: 112 MG/DL (ref 74–99)
HCO3 BLDA-SCNC: 22 MMOL/L (ref 21–25)
HCO3 BLDA-SCNC: 22 MMOL/L (ref 21–25)
HCO3 BLDA-SCNC: 23 MMOL/L (ref 21–25)
HCO3 BLDA-SCNC: 24 MMOL/L (ref 21–25)
HCO3 BLDA-SCNC: 25 MMOL/L (ref 21–25)
HCT VFR BLD AUTO: 33.9 % (ref 34–46)
HCT VFR BLD AUTO: 34.5 % (ref 34–46)
HCT VFR BLD AUTO: 35.5 % (ref 34–46)
HCT VFR BLDA CALC: 25 % (ref 34–46)
HCT VFR BLDA CALC: 26 % (ref 34–46)
HCT VFR BLDA CALC: 31 % (ref 34–46)
HCT VFR BLDA CALC: 34 % (ref 34–46)
HCT VFR BLDA CALC: 36 % (ref 34–46)
HGB BLD-MCNC: 10.7 GM/DL (ref 11.4–16)
HGB BLD-MCNC: 11.2 GM/DL (ref 11.4–16)
HGB BLD-MCNC: 11.3 GM/DL (ref 11.4–16)
INR PPP: 1 (ref ?–1.2)
LACTATE BLDA-MCNC: 0.7 MMOL/L (ref 0.5–1.6)
LACTATE BLDA-MCNC: 0.7 MMOL/L (ref 0.5–1.6)
LACTATE BLDA-MCNC: 0.8 MMOL/L (ref 0.5–1.6)
LACTATE BLDA-MCNC: 0.9 MMOL/L (ref 0.5–1.6)
LACTATE BLDA-MCNC: 1.3 MMOL/L (ref 0.5–1.6)
LYMPHOCYTES # SPEC AUTO: 0.7 K/UL (ref 1–4.8)
LYMPHOCYTES # SPEC AUTO: 1.5 K/UL (ref 1–4.8)
LYMPHOCYTES # SPEC AUTO: 2 K/UL (ref 1–4.8)
LYMPHOCYTES NFR SPEC AUTO: 11 %
LYMPHOCYTES NFR SPEC AUTO: 4 %
LYMPHOCYTES NFR SPEC AUTO: 8 %
MAGNESIUM SPEC-SCNC: 2.8 MG/DL (ref 1.6–2.3)
MCH RBC QN AUTO: 28.2 PG (ref 25–35)
MCH RBC QN AUTO: 28.8 PG (ref 25–35)
MCH RBC QN AUTO: 29.3 PG (ref 25–35)
MCHC RBC AUTO-ENTMCNC: 31.5 G/DL (ref 31–37)
MCHC RBC AUTO-ENTMCNC: 31.9 G/DL (ref 31–37)
MCHC RBC AUTO-ENTMCNC: 32.5 G/DL (ref 31–37)
MCV RBC AUTO: 89.5 FL (ref 80–100)
MCV RBC AUTO: 90.1 FL (ref 80–100)
MCV RBC AUTO: 90.4 FL (ref 80–100)
MONOCYTES # BLD AUTO: 0.5 K/UL (ref 0–1)
MONOCYTES # BLD AUTO: 0.6 K/UL (ref 0–1)
MONOCYTES # BLD AUTO: 0.9 K/UL (ref 0–1)
MONOCYTES NFR BLD AUTO: 3 %
MONOCYTES NFR BLD AUTO: 4 %
MONOCYTES NFR BLD AUTO: 5 %
NEUTROPHILS # BLD AUTO: 14.5 K/UL (ref 1.3–7.7)
NEUTROPHILS # BLD AUTO: 14.9 K/UL (ref 1.3–7.7)
NEUTROPHILS # BLD AUTO: 15.9 K/UL (ref 1.3–7.7)
NEUTROPHILS NFR BLD AUTO: 85 %
NEUTROPHILS NFR BLD AUTO: 85 %
NEUTROPHILS NFR BLD AUTO: 91 %
PCO2 BLDA: 36 MMHG (ref 35–45)
PCO2 BLDA: 37 MMHG (ref 35–45)
PCO2 BLDA: 45 MMHG (ref 35–45)
PCO2 BLDA: 47 MMHG (ref 35–45)
PCO2 BLDA: 47 MMHG (ref 35–45)
PH BLDA: 7.31 [PH] (ref 7.35–7.45)
PH BLDA: 7.32 [PH] (ref 7.35–7.45)
PH BLDA: 7.33 [PH] (ref 7.35–7.45)
PH BLDA: 7.39 [PH] (ref 7.35–7.45)
PH BLDA: 7.4 [PH] (ref 7.35–7.45)
PH BLDA: 7.4 [PH] (ref 7.35–7.45)
PH BLDA: 7.41 [PH] (ref 7.35–7.45)
PLATELET # BLD AUTO: 219 K/UL (ref 150–450)
PLATELET # BLD AUTO: 262 K/UL (ref 150–450)
PLATELET # BLD AUTO: 282 K/UL (ref 150–450)
PO2 BLDA: 141 MMHG (ref 83–108)
PO2 BLDA: 157 MMHG (ref 83–108)
PO2 BLDA: 207 MMHG (ref 83–108)
PO2 BLDA: 243 MMHG (ref 83–108)
PO2 BLDA: 246 MMHG (ref 83–108)
PO2 BLDA: 349 MMHG (ref 83–108)
PO2 BLDA: 410 MMHG (ref 83–108)
POTASSIUM BLDA-SCNC: 3.9 MMOL/L (ref 3.4–4.5)
POTASSIUM BLDA-SCNC: 4 MMOL/L (ref 3.4–4.5)
POTASSIUM BLDA-SCNC: 4.9 MMOL/L (ref 3.4–4.5)
POTASSIUM BLDA-SCNC: 5.2 MMOL/L (ref 3.4–4.5)
POTASSIUM BLDA-SCNC: 5.4 MMOL/L (ref 3.4–4.5)
POTASSIUM SERPL-SCNC: 4.3 MMOL/L (ref 3.5–5.1)
PROT SERPL-MCNC: 5 G/DL (ref 6.3–8.2)
PT BLD: 10.9 SEC (ref 9–12)
SODIUM BLDA-SCNC: 136 MMOL/L (ref 135–146)
SODIUM BLDA-SCNC: 137 MMOL/L (ref 135–146)
SODIUM BLDA-SCNC: 138 MMOL/L (ref 135–146)
SODIUM BLDA-SCNC: 140 MMOL/L (ref 135–146)
SODIUM BLDA-SCNC: 141 MMOL/L (ref 135–146)
SODIUM SERPL-SCNC: 137 MMOL/L (ref 137–145)
WBC # BLD AUTO: 15.9 K/UL (ref 3.8–10.6)
WBC # BLD AUTO: 17.6 K/UL (ref 3.8–10.6)
WBC # BLD AUTO: 18.8 K/UL (ref 3.8–10.6)

## 2022-05-27 PROCEDURE — 82330 ASSAY OF CALCIUM: CPT

## 2022-05-27 PROCEDURE — 85025 COMPLETE CBC W/AUTO DIFF WBC: CPT

## 2022-05-27 PROCEDURE — 85520 HEPARIN ASSAY: CPT

## 2022-05-27 PROCEDURE — B24BZZ4 ULTRASONOGRAPHY OF HEART WITH AORTA, TRANSESOPHAGEAL: ICD-10-PCS

## 2022-05-27 PROCEDURE — 84439 ASSAY OF FREE THYROXINE: CPT

## 2022-05-27 PROCEDURE — 86920 COMPATIBILITY TEST SPIN: CPT

## 2022-05-27 PROCEDURE — 85610 PROTHROMBIN TIME: CPT

## 2022-05-27 PROCEDURE — 86850 RBC ANTIBODY SCREEN: CPT

## 2022-05-27 PROCEDURE — 86891 AUTOLOGOUS BLOOD OP SALVAGE: CPT

## 2022-05-27 PROCEDURE — 80048 BASIC METABOLIC PNL TOTAL CA: CPT

## 2022-05-27 PROCEDURE — 5A1221Z PERFORMANCE OF CARDIAC OUTPUT, CONTINUOUS: ICD-10-PCS

## 2022-05-27 PROCEDURE — 71045 X-RAY EXAM CHEST 1 VIEW: CPT

## 2022-05-27 PROCEDURE — 83735 ASSAY OF MAGNESIUM: CPT

## 2022-05-27 PROCEDURE — 94640 AIRWAY INHALATION TREATMENT: CPT

## 2022-05-27 PROCEDURE — 85730 THROMBOPLASTIN TIME PARTIAL: CPT

## 2022-05-27 PROCEDURE — 82805 BLOOD GASES W/O2 SATURATION: CPT

## 2022-05-27 PROCEDURE — 94760 N-INVAS EAR/PLS OXIMETRY 1: CPT

## 2022-05-27 PROCEDURE — 84100 ASSAY OF PHOSPHORUS: CPT

## 2022-05-27 PROCEDURE — 02UG0JZ SUPPLEMENT MITRAL VALVE WITH SYNTHETIC SUBSTITUTE, OPEN APPROACH: ICD-10-PCS

## 2022-05-27 PROCEDURE — 86901 BLOOD TYPING SEROLOGIC RH(D): CPT

## 2022-05-27 PROCEDURE — 02L70CK OCCLUSION OF LEFT ATRIAL APPENDAGE WITH EXTRALUMINAL DEVICE, OPEN APPROACH: ICD-10-PCS

## 2022-05-27 PROCEDURE — 85027 COMPLETE CBC AUTOMATED: CPT

## 2022-05-27 PROCEDURE — 86900 BLOOD TYPING SEROLOGIC ABO: CPT

## 2022-05-27 PROCEDURE — 80053 COMPREHEN METABOLIC PANEL: CPT

## 2022-05-27 PROCEDURE — 71046 X-RAY EXAM CHEST 2 VIEWS: CPT

## 2022-05-27 PROCEDURE — 84443 ASSAY THYROID STIM HORMONE: CPT

## 2022-05-27 RX ADMIN — IPRATROPIUM BROMIDE AND ALBUTEROL SULFATE SCH ML: .5; 3 SOLUTION RESPIRATORY (INHALATION) at 13:36

## 2022-05-27 RX ADMIN — IPRATROPIUM BROMIDE AND ALBUTEROL SULFATE SCH: .5; 3 SOLUTION RESPIRATORY (INHALATION) at 16:12

## 2022-05-27 RX ADMIN — POTASSIUM CHLORIDE SCH MLS/HR: 14.9 INJECTION, SOLUTION INTRAVENOUS at 12:43

## 2022-05-27 RX ADMIN — INSULIN HUMAN SCH MLS/HR: 100 INJECTION, SOLUTION PARENTERAL at 15:20

## 2022-05-27 RX ADMIN — KETOROLAC TROMETHAMINE SCH MG: 15 INJECTION, SOLUTION INTRAMUSCULAR; INTRAVENOUS at 18:09

## 2022-05-27 RX ADMIN — IPRATROPIUM BROMIDE AND ALBUTEROL SULFATE SCH ML: .5; 3 SOLUTION RESPIRATORY (INHALATION) at 19:54

## 2022-05-27 RX ADMIN — SODIUM CHLORIDE, PRESERVATIVE FREE SCH ML: 5 INJECTION INTRAVENOUS at 21:27

## 2022-05-27 RX ADMIN — CLEVIPIDINE SCH MLS/HR: 0.5 EMULSION INTRAVENOUS at 13:18

## 2022-05-27 RX ADMIN — ACETAMINOPHEN SCH MLS/HR: 10 INJECTION, SOLUTION INTRAVENOUS at 18:09

## 2022-05-27 RX ADMIN — MUPIROCIN SCH APPLIC: 20 OINTMENT TOPICAL at 21:27

## 2022-05-27 RX ADMIN — CLEVIPIDINE SCH MLS/HR: 0.5 EMULSION INTRAVENOUS at 12:42

## 2022-05-27 RX ADMIN — IPRATROPIUM BROMIDE AND ALBUTEROL SULFATE SCH ML: .5; 3 SOLUTION RESPIRATORY (INHALATION) at 16:08

## 2022-05-27 RX ADMIN — ACETAMINOPHEN SCH MLS/HR: 10 INJECTION, SOLUTION INTRAVENOUS at 13:21

## 2022-05-27 RX ADMIN — CLEVIPIDINE SCH MLS/HR: 0.5 EMULSION INTRAVENOUS at 20:32

## 2022-05-27 RX ADMIN — HEPARIN SODIUM SCH UNIT: 5000 INJECTION INTRAVENOUS; SUBCUTANEOUS at 16:40

## 2022-05-27 NOTE — P.ANPRN
Procedure Note - Anesthesia





- Invasive Line


  ** Right Arterial Line


Time Out Performed: Yes


Date of Procedure: 05/27/22


Time of Procedure: 07:10


Location of Patient: PreOp


Preparation: Sterile Prep, Sterile Dressing


Arterial Line Location: Radial


Ultrasound Used: No


Needle Guage: 20


Narrative: 


Right radial arterial catheter placed by CRNA








  ** Right Central Line


Time Out Performed: Yes


Date of Procedure: 05/27/22


Time of Procedure: 07:45


Location of Patient: PreOp


Preparation: Sterile Prep, Sterile Dressing


Ultrasound Used: Yes


Purpose - Visualization and  Identification of Vasculature: Yes


Needle Guage: 18


Image Stored and Saved: Yes


Narrative: 


Central line placement per sterile protocol utilized.








  ** Right [place] Lam


Time Out Performed: Yes


Date of Procedure: 05/27/22


Time of Procedure: 07:55


Location of Patient: PreOp


Preparation: Sterile Prep, Sterile Dressing


Narrative: 


SWAN floated to PA waveform.  Secured @ 37 cm

## 2022-05-27 NOTE — XR
EXAMINATION TYPE: XR chest 1V portable

 

DATE OF EXAM: 5/27/2022

 

CLINICAL HISTORY: Postopen cardiac surgery.  

 

TECHNIQUE: Single AP portable supine view of the chest is obtained.

 

COMPARISON: Chest x-ray from 3 days earlier

 

FINDINGS:  There is new endotracheal tube terminating inferior clavicular level approximately 3 to 4 
cm above the pierce. There is new orogastric tube projecting below diaphragm. There is new right inte
rnal jugular Oxford-Lam catheter with tip at level of pulmonary outflow tract. There is right apical c
hest tube without pneumothorax. There is left atrial appendage clip along with surgical change of lev
el of the mitral valve. Overlying sternal wires and mediastinal clips are now present. Overlying epic
ardial pacer wires are now seen.

 

Both lungs remain clear. Cardiac silhouette size remains within normal limits. Osseous structures are
 intact.

 

IMPRESSION:

1. Tubes and lines are satisfactory in position as detailed above.

2. New extensive postsurgical changes. New right-sided chest tube without pneumothorax. Lungs remain 
grossly clear currently.

## 2022-05-27 NOTE — P.ANPRN
Procedure Note - Anesthesia





- JALYN Intraop Pre Bypass


  ** JALYN Intraop - Anesthesia


Indication: Chest pain, Mitral regurgitation


Date of Procedure: 05/27/22


Pre-operative Diagnosis: MR


Post-operative Diagnosis: Same


Surgeon: Rafiq Capps


Left Ventricle: 





50


Ejection Fraction: Normal


Regional Wall Motion Abnormalities: None


Left Ventricle Hypertrophy: No


R. Ventricle Function: Normal


Anatomy: Trileaflet


Aortic Stenosis: None


Aortic Regurgitation: None


Mitral Valve: 


2+ MR, decrease from preoperative assessment





Mitral Stenosis: None


Mitral Regurgitation: Moderate


Tricuspid Stenosis: None


Tricuspid Regurgitation: Trace


Pulmonic Stenosis: None


Pulmonic Regurgitation: None


R. Atrial Dilation: No


R. Atrial PFO: No


L. Atrial Dilation: No


Aortic Dissection: No


Aortic Calcification: None


Plural Effusion: None





- JALYN Intraop Post Bypass


  ** JALYN Intraop Post Bypass


Procedure Performed: MV annuloplasty


Left Ventricle: 





50


Ejection Fraction: Normal


Regional Wall Motion Abnormalities: None


R. Ventricle Function: Normal


Aortic Valve: Unchanged


Mitral Valve: 


Trace MR.  Annuloplasty ring.  Peak 9 mmHg, Mean 3 mmHg





Tricuspid: Unchanged


Tricuspid: 


Mild TR





Pulmonic: Unchanged


Aortic Dissection: No

## 2022-05-27 NOTE — P.CNPUL
History of Present Illness


Consult date: 05/27/22


Chief complaint: Post thoracotomy


History of present illness: 








62-year-old female patient, underwent a mitral valve annuloplasty and she is 

currently postop day #0.  The patient was brought into the intensive care unit. 

Pulmonary critical care services were consulted.  I saw the patient immediately 

after she arrived from the operating room.  She was hemodynamically stable on no

pressors.  The patient was sedated with propofol and the sedation was weaned 

down the propofol was at the rate of 10 g is particularly per minute.  The 

patient was adequately sedated and the patient was, comfortable, sickle cell 

mechanical ventilator.    The patient was an assist-control mode of mechanical 

ventilation at the rate of 12 with a tidal volume of 400 and FiO2 of 100% with a

PEEP of 10.  The FiO2 was up to 60%.  Initial blood gases showed a pH of 7.31 

with a pCO2 of 47 and pO2 of 207.  The patient has chest tubes 1 mediastinal and

1 right pleural.  The postop chest x-ray showed no acute abnormalities.  No 

evidence of pneumothorax.  ET tube was in a good location.  The patient has a 

Bellaire-Lam catheter in the right IJ which is also in a good location.  The 

patient's cardiac rhythm is sinus.  The patient has no hemodynamic instability. 

Pulmonary artery pressures were 35/19.  Cardiac output was at 4.3 with an index 

of 2.2.  Urine output was adequate.  The blood work showed a white cell, 70.6 

with a hemoglobin of 11.3 and a platelet count is 262.  Output from the chest 

tubes are minimal at this point in time.  No evidence of any bleeding. 





Review of Systems


ROS unobtainable: due to endotracheal tube





Past Medical History


Past Medical History: GERD/Reflux, Hypertension, Mitral Valve Prolapse (MVP), 

Osteoarthritis (OA), Pneumonia, Thyroid Disorder


Additional Past Medical History / Comment(s): Mitral valve prolapse, hx. colon 

polyps, SOB w/exertion although a little better recently, needing knee 

replacement, past hx. pneumonia 2020, possible sleep apnea


History of Any Multi-Drug Resistant Organisms: None Reported


Past Surgical History: Appendectomy, Heart Catheterization, Tubal Ligation


Additional Past Surgical History / Comment(s): parotid gland tumor removed, 

colonoscopies, fallopian tubes removed, ovaries removed 2008


Past Anesthesia/Blood Transfusion Reactions: No Reported Reaction, Family 

History of Problems w/ Anesthesia


Additional Past Anesthesia/Blood Transfusion Reaction / Comment(s): 16 y.o. son 

had some chest pain that was attributed to anesthesia per pt. but he's had 

further surg. w/no problems


Smoking Status: Never smoker





- Past Family History


  ** Mother Sister(s)


Family Medical History: Cancer


Additional Family Medical History / Comment(s): mom colon, sister brain & breast

cancer





Medications and Allergies


                                Home Medications











 Medication  Instructions  Recorded  Confirmed  Type


 


Levothyroxine Sodium [Synthroid] 150 mcg PO DAILY 10/14/17 05/26/22 History


 


Ibuprofen [Advil] 200 - 400 mg PO Q6HR PRN 04/10/19 05/24/22 History


 


ALPRAZolam [Xanax] 0.25 mg PO HS PRN 07/21/20 05/24/22 History


 


Losartan [Cozaar] 50 mg PO DAILY #30 tab 07/23/20 05/24/22 Rx


 


hydroCHLOROthiazide [Hydrodiuril] 12.5 mg PO DAILY #30 cap 07/23/20 05/24/22 Rx


 


Omeprazole [PriLOSEC] 20 mg PO DAILY PRN 05/24/22 05/24/22 History


 


Acetaminophen [Tylenol Arthritis] 650 mg PO Q6H PRN 05/26/22 05/26/22 History


 


Mupirocin [Mupirocin 2%] 1 applic NASAL BID #1 tub 05/26/22 05/26/22 Rx








                                    Allergies











Allergy/AdvReac Type Severity Reaction Status Date / Time


 


cortisone Allergy  Rash/Hives Verified 05/27/22 05:52


 


hyaluronic acid Allergy  Itching Verified 05/27/22 05:52





[From Hyalgan]   @injection  





   site  


 


hydrocodone AdvReac  SHORTNESS Verified 05/27/22 05:52





   OF BREATHE  


 


propoxyphene napsylate AdvReac  SHORTNESS Verified 05/27/22 05:52





[From Darvocet-N]   OF BREATHE  














Physical Exam


Vitals: 


                                   Vital Signs











  Temp Pulse Pulse Resp BP Pulse Ox FiO2


 


 05/27/22 14:30   63   13   100 


 


 05/27/22 14:15   63   12   100 05/27/22 14:00   70   12   100 05/27/22 13:47   70   12   


 


 05/27/22 13:45   70   12   100 


 


 05/27/22 13:36   70   12   


 


 05/27/22 13:30   70   13   100 


 


 05/27/22 13:15   70   13   100 05/27/22 13:00  95.9 F L  70   12   100  100


 


 05/27/22 12:52        60


 


 05/27/22 12:45   69   12   100 


 


 05/27/22 12:30   70   9 L   100 


 


 05/27/22 12:23        100


 


 05/27/22 12:15   69   8 L   


 


 05/27/22 12:13   0 L   21   


 


 05/27/22 12:10        100


 


 05/27/22 06:35  97.1 F L   76  16  171/69  97 








                                Intake and Output











 05/27/22 05/27/22 05/27/22





 06:59 14:59 22:59


 


Intake Total 100 102.747 


 


Output Total  1300 


 


Balance 100 -1197.253 


 


Intake:   


 


   51 


 


  Intake, IV Titration  51.747 





  Amount   


 


    Clevidipine Butyrate 25  18.133 





    mg In Empty Bag 1 bag @ 1   





    MG/HR 2 mls/hr IV .Q24H   





    [facility] Rx#:[id number]   


 


    propofoL 1,000 mg In  33.614 





    Empty Bag 1 bag @ Titrate   





    IV .Q0M [facility] Rx#:   





    [id number]   


 


Output:   


 


  Urine  400 


 


  Estimated Blood Loss  900 


 


Other:   


 


  Weight 92.6 kg  








                         ABP, PAP, CO, CI - Last 8 Hours











Arterial Blood Pressure        117/53


 


Arterial Blood Pressure        124/56


 


Arterial Blood Pressure        117/55


 


Arterial Blood Pressure        116/54


 


Arterial Blood Pressure        120/56


 


Arterial Blood Pressure        124/55


 


Arterial Blood Pressure        118/53


 


Arterial Blood Pressure        31/4


 


Arterial Blood Pressure        116/53


 


Arterial Blood Pressure        131/50


 


Pulmonary Artery Pressure      34/20


 


Pulmonary Artery Pressure      36/19


 


Pulmonary Artery Pressure      37/20


 


Pulmonary Artery Pressure      35/20


 


Pulmonary Artery Pressure      37/22


 


Pulmonary Artery Pressure      36/21


 


Pulmonary Artery Pressure      35/20


 


Pulmonary Artery Pressure      35/19


 


Pulmonary Artery Pressure      31/18


 


Pulmonary Artery Pressure      37/13


 


Pulmonary Artery Pressure      48/44


 


Cardiac Output                 4.3


 


Cardiac Output                 4.3


 


Cardiac Output                 4.4


 


Cardiac Output                 4.4


 


Cardiac Output                 4.4


 


Cardiac Output                 4.4


 


Cardiac Output                 4.4


 


Cardiac Output                 4.4


 


Cardiac Output                 4.4


 


Cardiac Index                  2.2


 


Cardiac Index                  2.2


 


Cardiac Index                  2.2


 


Cardiac Index                  2.2


 


Cardiac Index                  2.2


 


Cardiac Index                  2.2


 


Cardiac Index                  2.2


 


Cardiac Index                  2.2


 


Cardiac Index                  2.2




















Intubated on a mechanical ventilator, calm and comfortable, sedated with propofo

l, orogastric and orotracheal tube are both in place


Head exam was generally normal. There was no scleral icterus or corneal arcus. 

Mucous membranes were moist.


Neck was supple and without jugular venous distension, thyromegaly, or carotid 

bruits. Carotids were easily palpable bilaterally. There was no adenopathy.


Lungs were clear to auscultation and percussion, and with normal diaphragmatic 

excursion. No wheezes or rales were noted.  The patient has a right pleural and 

mediastinal chest tube both her mother place.


Cardiac exam revealed the PMI to be normally situated and sized. The rhythm was 

regular and no extrasystoles were noted during several minutes of auscultation. 

The first and second heart sounds were normal and physiologic splitting of the 

second heart sound was noted. There were no murmurs, rubs, clicks, or gallops.  

Sternum stable clean and intact with appropriate surgical dressing is in place


Abdominal exam revealed normal bowel sounds. The abdomen was soft, non-tender, 

and without masses, organomegaly, or appreciable enlargement of the abdominal 

aorta.


Examination of the extremities revealed easily palpable radial, femoral and 

pedal pulses. There was no cyanosis, clubbing or edema.


Examination of the skin revealed no evidence of significant rashes, suspicious 

appearing nevi or other concerning lesions.


Neurologic exam is suboptimal as the patient is sedated yet the patient 

withdraws to painful semination all 4 extremities without any limitation.  





Results





- Laboratory Findings


CBC and BMP: 


                                 05/27/22 14:07





                                 05/27/22 12:15


ABG











ABG pH  7.32  (7.35-7.45)  L  05/27/22  12:46    


 


ABG pCO2  47 mmHg (35-45)  H  05/27/22  12:46    


 


ABG pO2  207 mmHg ()  H  05/27/22  12:46    


 


ABG O2 Saturation  99.0 % (94-97)  H  05/27/22  12:46    





PT/INR, D-dimer











PT  10.9 sec (9.0-12.0)   05/27/22  12:15    


 


INR  1.0  (<1.2)   05/27/22  12:15    








Abnormal lab findings: 


                                  Abnormal Labs











  05/24/22 05/27/22 05/27/22





  09:09 08:50 09:40


 


WBC   


 


RBC   


 


Hgb   


 


Hct   


 


Neutrophils #   


 


ABG pH   


 


ABG pCO2   


 


ABG pO2   243 H  246 H


 


ABG Total CO2   


 


ABG O2 Saturation   99.6 H  99.6 H


 


ABG Hematocrit   


 


ABG Potassium   


 


ABG Ionized Calcium   


 


ABG Glucose    104 H


 


Hemoglobin    10.9 L


 


Chloride   


 


Glucose   


 


POC Glucose (mg/dL)   


 


Calcium   


 


Magnesium   


 


AST   


 


Total Protein   


 


Albumin   


 


Arterial Blood Potassium   


 


Arterial Blood Glucose    104 H


 


Crossmatch  See Detail  














  05/27/22 05/27/22 05/27/22





  10:06 10:30 11:27


 


WBC   


 


RBC   


 


Hgb   


 


Hct   


 


Neutrophils #   


 


ABG pH   7.31 L 


 


ABG pCO2   


 


ABG pO2  410 H  349 H  157 H


 


ABG Total CO2   


 


ABG O2 Saturation  100.0 H  99.8 H  99.2 H


 


ABG Hematocrit  25 L  26 L  31 L


 


ABG Potassium  5.4 H  5.2 H  4.9 H


 


ABG Ionized Calcium  3.8 L  4.1 L 


 


ABG Glucose  104 H  103 H  121 H


 


Hemoglobin  8.2 L  8.3 L  9.9 L


 


Chloride   


 


Glucose   


 


POC Glucose (mg/dL)   


 


Calcium   


 


Magnesium   


 


AST   


 


Total Protein   


 


Albumin   


 


Arterial Blood Potassium  5.4 H  5.2 H  4.9 H


 


Arterial Blood Glucose  104 H  103 H  121 H


 


Crossmatch   














  05/27/22 05/27/22 05/27/22





  12:15 12:15 12:18


 


WBC  18.8 H  


 


RBC  3.79 L  


 


Hgb  10.7 L  


 


Hct  33.9 L  


 


Neutrophils #  15.9 H  


 


ABG pH   


 


ABG pCO2   


 


ABG pO2   


 


ABG Total CO2   


 


ABG O2 Saturation   


 


ABG Hematocrit   


 


ABG Potassium   


 


ABG Ionized Calcium   


 


ABG Glucose   


 


Hemoglobin   


 


Chloride   109 H 


 


Glucose   112 H 


 


POC Glucose (mg/dL)    117 H


 


Calcium   8.2 L 


 


Magnesium   2.8 H 


 


AST   43 H 


 


Total Protein   5.0 L 


 


Albumin   2.9 L 


 


Arterial Blood Potassium   


 


Arterial Blood Glucose   


 


Crossmatch   














  05/27/22 05/27/22 05/27/22





  12:46 13:15 14:07


 


WBC    17.6 H


 


RBC   


 


Hgb    11.3 L


 


Hct   


 


Neutrophils #    14.9 H


 


ABG pH  7.32 L  


 


ABG pCO2  47 H  


 


ABG pO2  207 H  


 


ABG Total CO2  26 H  


 


ABG O2 Saturation  99.0 H  


 


ABG Hematocrit   


 


ABG Potassium   


 


ABG Ionized Calcium   


 


ABG Glucose   


 


Hemoglobin   


 


Chloride   


 


Glucose   


 


POC Glucose (mg/dL)   113 H 


 


Calcium   


 


Magnesium   


 


AST   


 


Total Protein   


 


Albumin   


 


Arterial Blood Potassium   


 


Arterial Blood Glucose   


 


Crossmatch   














  05/27/22





  14:09


 


WBC 


 


RBC 


 


Hgb 


 


Hct 


 


Neutrophils # 


 


ABG pH 


 


ABG pCO2 


 


ABG pO2 


 


ABG Total CO2 


 


ABG O2 Saturation 


 


ABG Hematocrit 


 


ABG Potassium 


 


ABG Ionized Calcium 


 


ABG Glucose 


 


Hemoglobin 


 


Chloride 


 


Glucose 


 


POC Glucose (mg/dL)  128 H


 


Calcium 


 


Magnesium 


 


AST 


 


Total Protein 


 


Albumin 


 


Arterial Blood Potassium 


 


Arterial Blood Glucose 


 


Crossmatch 














Assessment and Plan


Plan: 











Mitral valve annuloplasty, surgery was done for severe symptomatically mitral 

regurgitation the patient is postop day #0.  The patient is currently 

hemodynamically stable on no pressors.  Adequate cardiac output.  Adequate 

hemodynamics.





Post thoracotomy, currently intubated on mechanical ventilator.  The patient has

a right pleural and mediastinal chest tube.  No evidence of any pneumothorax.  

Output from the chest tube is minimal at this point in time.





Hypothyroidism





Hypertension





Colonic polyps





Chronic anxiety





Plan





With down the FiO2 to 50% and bleeding of the respiratory rate up to 20


Chest x-ray was reviewed


Blood gases was reviewed


Wean down the propofol and check weaning parameters and assess his readiness to 

wean and I would insipidus patient extubated within the next hour or so.


Monitor hemodynamic parameters


Cardiac rhythm is sinus


We'll continue to follow

## 2022-05-27 NOTE — OP
OPERATIVE REPORT



DATE OF THE OPERATION:

05/27/2022



ATTENDING SURGEON:

Dr. Rafiq Capps.



ASSISTANTS:

1. HALEY John.

2. MIKE Joseph.



PREOPERATIVE DIAGNOSIS:

Severe mitral regurgitation.



POSTOPERATIVE DIAGNOSIS:

Severe mitral regurgitation with dilated mitral anulus.



PROCEDURE:

Mitral valve repair with a #28 mm CarboMedics AnnuloFlex band, clip ligation of the

left atrial appendage with a 35 mm AtriClip and intraoperative transesophageal

echocardiogram.



ANESTHESIA:

General.



BLOOD LOSS:

500 mL.



SUMMARY:

The patient was brought to the operating room, placed in supine position.  Following

administration of a general endotracheal anesthetic, placement of a [place]-Lam catheter,

arterial line, adequate IV access, Bryson catheter, the patient was carefully prepped

and draped in normal sterile fashion using chlorhexidine paint and sterile towels.

Preoperative JALYN showed moderate central mitral regurgitation with mild coaptation;

this was under full anesthesia and completely unloaded.  LV function was good. Midline

incision in the chest was made and sternum divided. Pericardium was opened. Heart size

was normal in caliber.  The aorta was soft.  The patient was heparinized with ACT of

greater than 40. The aorta and two single-stage venous cannulas were placed. Antegrade

and retrograde cardioplegia catheters were positioned in the ascending aorta and the

coronary sinus.  The patient was placed on bypass, cross-clamp placed, heart arrested

with one liter of antegrade followed by 500 mL retrograde cardioplegia. Retrograde

cardioplegia was delivered 300 to 500 mL at the end of each 20-minute interval.



First the base of the left atrial appendage was measured.  A 35 mm AtriClip was secured

at the base, officially obliterating the left atrial appendage.  Next the left atrium

was entered at the junction of the right superior pulmonary vein.  A handheld retractor

was placed.  The mitral valve was identified.  The subvalvular apparatus was within

normal limits.  The anulus was dilated.  It sized to a 28 mm Car[employer]Medics AnnuloFlex

band.  Two-0 Tycron non-pledgeted sutures were placed from trigone to trigone along the

posterior anulus.  These were then passed through the band, which was seated and seated

well, and all sutures were secured, tied and cut using the One Month ligature system

device.  At this point it was tested with a handheld concha, and there was no evidence

of any further regurgitation, with good coaptation.  Next the left atriotomy was closed

using a double-layered pledgeted 4-0 Prolene vertical mattress followed by an over-and-

over stitch from both sides.  The patient was placed head down and complete de-airing

maneuver was performed three time. One liter of warm blood retrograde cardioplegia was

run. Cross-clamp was then removed. Once the patient was rewarmed and paced DDD at 70,

_____ventilated and brought off bypass. He came off bypass uneventfully with good

hemodynamics. Protamine was delivered, patient decannulated.  Pacing wires had already

been placed.  Mediastinal right pleural chest tubes were placed.  At this point the

sternum was closed with four #6 sternal wires, two figure-of-eight Converse sternal

cable closure devices. Skin, subcutaneous tissue and fascia were closed in three

layers.  No complications. Patient tolerated the procedure well.



Postoperative JALYN showed excellent mitral valve function, good coaptation of the

leaflets, no mitral regurgitation and good left ventricular function.





MMODL / IJN: [id number] / Job#: [id number]

## 2022-05-28 LAB
ALBUMIN SERPL-MCNC: 2.9 G/DL (ref 3.5–5)
ALP SERPL-CCNC: 44 U/L (ref 38–126)
ALT SERPL-CCNC: 17 U/L (ref 4–34)
ANION GAP SERPL CALC-SCNC: 3 MMOL/L
AST SERPL-CCNC: 57 U/L (ref 14–36)
BASOPHILS # BLD AUTO: 0 K/UL (ref 0–0.2)
BASOPHILS NFR BLD AUTO: 0 %
BUN SERPL-SCNC: 14 MG/DL (ref 7–17)
CALCIUM SPEC-MCNC: 7.6 MG/DL (ref 8.4–10.2)
CHLORIDE SERPL-SCNC: 105 MMOL/L (ref 98–107)
CO2 SERPL-SCNC: 25 MMOL/L (ref 22–30)
EOSINOPHIL # BLD AUTO: 0 K/UL (ref 0–0.7)
EOSINOPHIL NFR BLD AUTO: 0 %
ERYTHROCYTE [DISTWIDTH] IN BLOOD BY AUTOMATED COUNT: 3.45 M/UL (ref 3.8–5.4)
ERYTHROCYTE [DISTWIDTH] IN BLOOD: 14 % (ref 11.5–15.5)
GLUCOSE BLD-MCNC: 107 MG/DL (ref 75–99)
GLUCOSE BLD-MCNC: 111 MG/DL (ref 75–99)
GLUCOSE BLD-MCNC: 116 MG/DL (ref 75–99)
GLUCOSE BLD-MCNC: 117 MG/DL (ref 75–99)
GLUCOSE BLD-MCNC: 118 MG/DL (ref 75–99)
GLUCOSE BLD-MCNC: 119 MG/DL (ref 75–99)
GLUCOSE BLD-MCNC: 122 MG/DL (ref 75–99)
GLUCOSE BLD-MCNC: 123 MG/DL (ref 75–99)
GLUCOSE BLD-MCNC: 125 MG/DL (ref 75–99)
GLUCOSE BLD-MCNC: 127 MG/DL (ref 75–99)
GLUCOSE BLD-MCNC: 129 MG/DL (ref 75–99)
GLUCOSE BLD-MCNC: 130 MG/DL (ref 75–99)
GLUCOSE BLD-MCNC: 131 MG/DL (ref 75–99)
GLUCOSE BLD-MCNC: 131 MG/DL (ref 75–99)
GLUCOSE BLD-MCNC: 170 MG/DL (ref 75–99)
GLUCOSE BLD-MCNC: 170 MG/DL (ref 75–99)
GLUCOSE SERPL-MCNC: 107 MG/DL (ref 74–99)
HCT VFR BLD AUTO: 30.8 % (ref 34–46)
HGB BLD-MCNC: 10 GM/DL (ref 11.4–16)
LYMPHOCYTES # SPEC AUTO: 1.3 K/UL (ref 1–4.8)
LYMPHOCYTES NFR SPEC AUTO: 10 %
MAGNESIUM SPEC-SCNC: 2 MG/DL (ref 1.6–2.3)
MCH RBC QN AUTO: 29.1 PG (ref 25–35)
MCHC RBC AUTO-ENTMCNC: 32.6 G/DL (ref 31–37)
MCV RBC AUTO: 89.3 FL (ref 80–100)
MONOCYTES # BLD AUTO: 0.6 K/UL (ref 0–1)
MONOCYTES NFR BLD AUTO: 5 %
NEUTROPHILS # BLD AUTO: 10.1 K/UL (ref 1.3–7.7)
NEUTROPHILS NFR BLD AUTO: 83 %
PLATELET # BLD AUTO: 241 K/UL (ref 150–450)
POTASSIUM SERPL-SCNC: 4.1 MMOL/L (ref 3.5–5.1)
PROT SERPL-MCNC: 5.1 G/DL (ref 6.3–8.2)
SODIUM SERPL-SCNC: 133 MMOL/L (ref 137–145)
T4 FREE SERPL-MCNC: 1.49 NG/DL (ref 0.78–2.19)
WBC # BLD AUTO: 12.3 K/UL (ref 3.8–10.6)

## 2022-05-28 RX ADMIN — AMIODARONE HYDROCHLORIDE PRN MLS/HR: 50 INJECTION, SOLUTION INTRAVENOUS at 18:11

## 2022-05-28 RX ADMIN — IPRATROPIUM BROMIDE AND ALBUTEROL SULFATE SCH ML: .5; 3 SOLUTION RESPIRATORY (INHALATION) at 08:51

## 2022-05-28 RX ADMIN — AMIODARONE HYDROCHLORIDE PRN MLS/HR: 50 INJECTION, SOLUTION INTRAVENOUS at 10:13

## 2022-05-28 RX ADMIN — ATORVASTATIN CALCIUM SCH MG: 40 TABLET, FILM COATED ORAL at 08:18

## 2022-05-28 RX ADMIN — KETOROLAC TROMETHAMINE SCH MG: 15 INJECTION, SOLUTION INTRAMUSCULAR; INTRAVENOUS at 12:32

## 2022-05-28 RX ADMIN — METOPROLOL TARTRATE SCH MG: 25 TABLET, FILM COATED ORAL at 20:25

## 2022-05-28 RX ADMIN — AMIODARONE HYDROCHLORIDE PRN MLS/HR: 50 INJECTION, SOLUTION INTRAVENOUS at 06:30

## 2022-05-28 RX ADMIN — POTASSIUM CHLORIDE SCH MLS/HR: 14.9 INJECTION, SOLUTION INTRAVENOUS at 12:36

## 2022-05-28 RX ADMIN — HEPARIN SODIUM SCH UNIT: 5000 INJECTION INTRAVENOUS; SUBCUTANEOUS at 08:19

## 2022-05-28 RX ADMIN — SODIUM CHLORIDE, PRESERVATIVE FREE SCH ML: 5 INJECTION INTRAVENOUS at 20:25

## 2022-05-28 RX ADMIN — INSULIN HUMAN SCH MLS/HR: 100 INJECTION, SOLUTION PARENTERAL at 19:07

## 2022-05-28 RX ADMIN — IPRATROPIUM BROMIDE AND ALBUTEROL SULFATE SCH ML: .5; 3 SOLUTION RESPIRATORY (INHALATION) at 15:28

## 2022-05-28 RX ADMIN — KETOROLAC TROMETHAMINE SCH MG: 15 INJECTION, SOLUTION INTRAMUSCULAR; INTRAVENOUS at 19:11

## 2022-05-28 RX ADMIN — CLOPIDOGREL BISULFATE SCH MG: 75 TABLET ORAL at 08:18

## 2022-05-28 RX ADMIN — AMIODARONE HYDROCHLORIDE SCH MG: 200 TABLET ORAL at 20:25

## 2022-05-28 RX ADMIN — ASPIRIN 325 MG ORAL TABLET SCH MG: 325 PILL ORAL at 08:18

## 2022-05-28 RX ADMIN — DOCUSATE SODIUM AND SENNOSIDES SCH EACH: 50; 8.6 TABLET ORAL at 20:24

## 2022-05-28 RX ADMIN — CLEVIPIDINE SCH MLS/HR: 0.5 EMULSION INTRAVENOUS at 04:32

## 2022-05-28 RX ADMIN — IPRATROPIUM BROMIDE AND ALBUTEROL SULFATE SCH ML: .5; 3 SOLUTION RESPIRATORY (INHALATION) at 19:32

## 2022-05-28 RX ADMIN — KETOROLAC TROMETHAMINE SCH MG: 15 INJECTION, SOLUTION INTRAMUSCULAR; INTRAVENOUS at 05:11

## 2022-05-28 RX ADMIN — AMIODARONE HYDROCHLORIDE PRN MLS/HR: 50 INJECTION, SOLUTION INTRAVENOUS at 06:51

## 2022-05-28 RX ADMIN — HEPARIN SODIUM SCH UNIT: 5000 INJECTION INTRAVENOUS; SUBCUTANEOUS at 16:51

## 2022-05-28 RX ADMIN — MUPIROCIN SCH APPLIC: 20 OINTMENT TOPICAL at 20:25

## 2022-05-28 RX ADMIN — MUPIROCIN SCH APPLIC: 20 OINTMENT TOPICAL at 08:19

## 2022-05-28 RX ADMIN — IPRATROPIUM BROMIDE AND ALBUTEROL SULFATE SCH ML: .5; 3 SOLUTION RESPIRATORY (INHALATION) at 12:20

## 2022-05-28 RX ADMIN — HEPARIN SODIUM SCH UNIT: 5000 INJECTION INTRAVENOUS; SUBCUTANEOUS at 00:49

## 2022-05-28 RX ADMIN — SODIUM CHLORIDE, PRESERVATIVE FREE SCH ML: 5 INJECTION INTRAVENOUS at 08:19

## 2022-05-28 RX ADMIN — KETOROLAC TROMETHAMINE SCH MG: 15 INJECTION, SOLUTION INTRAMUSCULAR; INTRAVENOUS at 00:50

## 2022-05-28 NOTE — P.PN
Subjective


Progress Note Date: 05/28/22








62-year-old female patient, underwent a mitral valve annuloplasty and she is 

currently postop day #0.  The patient was brought into the intensive care unit. 

Pulmonary critical care services were consulted.  I saw the patient immediately 

after she arrived from the operating room.  She was hemodynamically stable on no

pressors.  The patient was sedated with propofol and the sedation was weaned 

down the propofol was at the rate of 10 g is particularly per minute.  The 

patient was adequately sedated and the patient was, comfortable, sickle cell 

mechanical ventilator.    The patient was an assist-control mode of mechanical 

ventilation at the rate of 12 with a tidal volume of 400 and FiO2 of 100% with a

PEEP of 10.  The FiO2 was up to 60%.  Initial blood gases showed a pH of 7.31 

with a pCO2 of 47 and pO2 of 207.  The patient has chest tubes 1 mediastinal and

1 right pleural.  The postop chest x-ray showed no acute abnormalities.  No 

evidence of pneumothorax.  ET tube was in a good location.  The patient has a 

[facility]-Lam catheter in the right IJ which is also in a good location.  The 

patient's cardiac rhythm is sinus.  The patient has no hemodynamic instability. 

Pulmonary artery pressures were 35/19.  Cardiac output was at 4.3 with an index 

of 2.2.  Urine output was adequate.  The blood work showed a white cell, 70.6 

with a hemoglobin of 11.3 and a platelet count is 262.  Output from the chest 

tubes are minimal at this point in time.  No evidence of any bleeding. 





05/28/2022 the patient is postop day #1.  She is doing well.  She extubated 

yesterday without any major difficulties and this morning she is on 3 L of 

oxygen by nasal cannula.  The patient is using incentive spirometer, pulling 

approximately thousand.  No respiratory distress.  Chest x-ray from today 

essentially showing postsurgical changes.  The patient has a right-sided chest 

tube, mediastinal chest tube and the [facility]-Lam catheter in place.  ET tube is 

removed.  Some atelectatic changes and left lung base.  No evidence of any 

pneumothorax.  Output from the chest tubes combined has been around 600 mL since

came from the operating room.  Earlier this morning, the patient went into 

atrial fibrillation with rapid ventricular response, and expected outcome of 

surgery, and the patient was started on amiodarone drip loading and subsequent 

maintenance will be following.  The patient is also on beta blockers in the form

of metoprolol and the dose has been increased up to 25 mg by mouth twice a day. 

No anticoagulants for now.  Hemodynamically she is stable.  Adequate urine 

output.  White cell count of 12.3 with hemoglobin of 10 and a platelet count of 

241.  BUN is at 40 with a creatinine of 0.67 and his sodium level is at 133.  

TSH is at 1.49.





Objective





- Vital Signs


Vital signs: 


                                   Vital Signs











Temp  98.1 F   05/28/22 05:00


 


Pulse  96   05/28/22 07:00


 


Resp  18   05/28/22 07:00


 


BP  138/75   05/28/22 01:00


 


Pulse Ox  93 L  05/28/22 07:00


 


FiO2  50   05/27/22 15:24








                                 Intake & Output











 05/27/22 05/28/22 05/28/22





 18:59 06:59 18:59


 


Intake Total 313.415 0167.568 59


 


Output Total 2540 1065 15


 


Balance -1692.273 593.568 44


 


Weight  96.4 kg 


 


Intake:   


 


   768 59


 


    Lactated Ringers 1,000 ml  550 50





    @ 20 mls/hr IV .Q24H TERRA   





    Rx#:[id number]   


 


    cardiac output 100 110 


 


    pressure bags 54 108 9


 


  Intake, IV Titration 642.727 120.568 





  Amount   


 


    ACETAMINOPHEN IV (For   





    ) 1,000 mg In Empty Bag 1   





    bag @ 400 mls/hr IVPB   





    Q6H TERRA Rx#:[id number]   


 


    Clevidipine Butyrate 25 53.600 61.899 





    mg In Empty Bag 1 bag @ 1   





    MG/HR 2 mls/hr IV .Q24H   





    TERRA Rx#:[id number]   


 


    Insulin Regular 100 unit 2.828 8.669 





    In Sodium Chloride 0.9%   





    100 ml @ Per Protocol IV   





    .Q0M TERRA Rx#:[id number]   


 


    Lactated Ringers 1,000 ml 300 50 





    @ 20 mls/hr IV .Q24H TERRA   





    Rx#:[id number]   


 


    ceFAZolin 2 gm In Sodium 50  





    Chloride 0.9% 50 ml @ 100   





    mls/hr IVPB Q8HR TERRA Rx#   





    :[id number]   


 


    propofoL 1,000 mg In 36.299  





    Empty Bag 1 bag @ Titrate   





    IV .Q0M TERRA Rx#:   





    [id number]   


 


  Oral  770 


 


Output:   


 


  Chest Tube Drainage 150 215 0


 


    mediastinal and right 150 215 0





    pleural   


 


  Urine 1490 850 15


 


  Estimated Blood Loss 900  


 


Other:   


 


  Voiding Method Indwelling Catheter Indwelling Catheter 








                       ABP, PAP, CO, CI - Last Documented











Arterial Blood Pressure        100/51


 


Pulmonary Artery Pressure      31/14


 


Cardiac Output                 5.3


 


Cardiac Index                  2.7

















- Exam








Intubated on a mechanical ventilator, calm and comfortable, patient is on 3 L of

oxygen O2 nasal cannula she is awake and alert and following commands and answer

ing questions.


Head exam was generally normal. There was no scleral icterus or corneal arcus. 

Mucous membranes were moist.


Neck was supple and without jugular venous distension, thyromegaly, or carotid 

bruits. Carotids were easily palpable bilaterally. There was no adenopathy.


Lungs were clear to auscultation and percussion, and with normal diaphragmatic 

excursion. No wheezes or rales were noted.  The patient has a right pleural and 

mediastinal chest tube both her mother place.


Cardiac exam revealed the PMI to be normally situated and sized. The rhythm is 

irregular consistent of atrial fibrillation with a controlled rate and no 

extrasystoles were noted during several minutes of auscultation. The first and 

second heart sounds were normal and physiologic splitting of the second heart 

sound was noted. There were no murmurs, rubs, clicks, or gallops.  Sternum 

stable clean and intact with appropriate surgical dressing is in place


Abdominal exam revealed normal bowel sounds. The abdomen was soft, non-tender, 

and without masses, organomegaly, or appreciable enlargement of the abdominal 

aorta.


Examination of the extremities revealed easily palpable radial, femoral and 

pedal pulses. There was no cyanosis, clubbing or edema.


Examination of the skin revealed no evidence of significant rashes, suspicious 

appearing nevi or other concerning lesions.


Neurologic  awake and alert and there is no focal neurological deficits.





- Labs


CBC & Chem 7: 


                                 05/28/22 04:15





                                 05/28/22 04:15


Labs: 


                  Abnormal Lab Results - Last 24 Hours (Table)











  05/24/22 05/27/22 05/27/22 Range/Units





  09:09 08:50 09:40 


 


WBC     (3.8-10.6)  k/uL


 


RBC     (3.80-5.40)  m/uL


 


Hgb     (11.4-16.0)  gm/dL


 


Hct     (34.0-46.0)  %


 


Neutrophils #     (1.3-7.7)  k/uL


 


Lymphocytes #     (1.0-4.8)  k/uL


 


ABG pH     (7.35-7.45)  


 


ABG pCO2     (35-45)  mmHg


 


ABG pO2   243 H  246 H  ()  mmHg


 


ABG Total CO2     (19-24)  mmol/L


 


ABG O2 Saturation   99.6 H  99.6 H  (94-97)  %


 


ABG Hematocrit     (34.0-46.0)  %


 


ABG Potassium     (3.4-4.5)  mmol/L


 


ABG Ionized Calcium     (4.5-5.3)  mg/dL


 


ABG Glucose    104 H  (75-99)  mg/dL


 


Hemoglobin    10.9 L  (11.4-16.0)  gm/dL


 


Sodium     (137-145)  mmol/L


 


Chloride     ()  mmol/L


 


Glucose     (74-99)  mg/dL


 


POC Glucose (mg/dL)     (75-99)  mg/dL


 


Calcium     (8.4-10.2)  mg/dL


 


Magnesium     (1.6-2.3)  mg/dL


 


AST     (14-36)  U/L


 


Total Protein     (6.3-8.2)  g/dL


 


Albumin     (3.5-5.0)  g/dL


 


TSH     (0.465-4.680)  mIU/L


 


Arterial Blood Potassium     (3.4-4.5)  mmol/L


 


Arterial Blood Glucose    104 H  (75-99)  mg/dL


 


Crossmatch  See Detail    














  05/27/22 05/27/22 05/27/22 Range/Units





  10:06 10:30 11:27 


 


WBC     (3.8-10.6)  k/uL


 


RBC     (3.80-5.40)  m/uL


 


Hgb     (11.4-16.0)  gm/dL


 


Hct     (34.0-46.0)  %


 


Neutrophils #     (1.3-7.7)  k/uL


 


Lymphocytes #     (1.0-4.8)  k/uL


 


ABG pH   7.31 L   (7.35-7.45)  


 


ABG pCO2     (35-45)  mmHg


 


ABG pO2  410 H  349 H  157 H  ()  mmHg


 


ABG Total CO2     (19-24)  mmol/L


 


ABG O2 Saturation  100.0 H  99.8 H  99.2 H  (94-97)  %


 


ABG Hematocrit  25 L  26 L  31 L  (34.0-46.0)  %


 


ABG Potassium  5.4 H  5.2 H  4.9 H  (3.4-4.5)  mmol/L


 


ABG Ionized Calcium  3.8 L  4.1 L   (4.5-5.3)  mg/dL


 


ABG Glucose  104 H  103 H  121 H  (75-99)  mg/dL


 


Hemoglobin  8.2 L  8.3 L  9.9 L  (11.4-16.0)  gm/dL


 


Sodium     (137-145)  mmol/L


 


Chloride     ()  mmol/L


 


Glucose     (74-99)  mg/dL


 


POC Glucose (mg/dL)     (75-99)  mg/dL


 


Calcium     (8.4-10.2)  mg/dL


 


Magnesium     (1.6-2.3)  mg/dL


 


AST     (14-36)  U/L


 


Total Protein     (6.3-8.2)  g/dL


 


Albumin     (3.5-5.0)  g/dL


 


TSH     (0.465-4.680)  mIU/L


 


Arterial Blood Potassium  5.4 H  5.2 H  4.9 H  (3.4-4.5)  mmol/L


 


Arterial Blood Glucose  104 H  103 H  121 H  (75-99)  mg/dL


 


Crossmatch     














  05/27/22 05/27/22 05/27/22 Range/Units





  12:15 12:15 12:18 


 


WBC  18.8 H    (3.8-10.6)  k/uL


 


RBC  3.79 L    (3.80-5.40)  m/uL


 


Hgb  10.7 L    (11.4-16.0)  gm/dL


 


Hct  33.9 L    (34.0-46.0)  %


 


Neutrophils #  15.9 H    (1.3-7.7)  k/uL


 


Lymphocytes #     (1.0-4.8)  k/uL


 


ABG pH     (7.35-7.45)  


 


ABG pCO2     (35-45)  mmHg


 


ABG pO2     ()  mmHg


 


ABG Total CO2     (19-24)  mmol/L


 


ABG O2 Saturation     (94-97)  %


 


ABG Hematocrit     (34.0-46.0)  %


 


ABG Potassium     (3.4-4.5)  mmol/L


 


ABG Ionized Calcium     (4.5-5.3)  mg/dL


 


ABG Glucose     (75-99)  mg/dL


 


Hemoglobin     (11.4-16.0)  gm/dL


 


Sodium     (137-145)  mmol/L


 


Chloride   109 H   ()  mmol/L


 


Glucose   112 H   (74-99)  mg/dL


 


POC Glucose (mg/dL)    117 H  (75-99)  mg/dL


 


Calcium   8.2 L   (8.4-10.2)  mg/dL


 


Magnesium   2.8 H   (1.6-2.3)  mg/dL


 


AST   43 H   (14-36)  U/L


 


Total Protein   5.0 L   (6.3-8.2)  g/dL


 


Albumin   2.9 L   (3.5-5.0)  g/dL


 


TSH     (0.465-4.680)  mIU/L


 


Arterial Blood Potassium     (3.4-4.5)  mmol/L


 


Arterial Blood Glucose     (75-99)  mg/dL


 


Crossmatch     














  05/27/22 05/27/22 05/27/22 Range/Units





  12:46 13:15 14:07 


 


WBC    17.6 H  (3.8-10.6)  k/uL


 


RBC     (3.80-5.40)  m/uL


 


Hgb    11.3 L  (11.4-16.0)  gm/dL


 


Hct     (34.0-46.0)  %


 


Neutrophils #    14.9 H  (1.3-7.7)  k/uL


 


Lymphocytes #     (1.0-4.8)  k/uL


 


ABG pH  7.32 L    (7.35-7.45)  


 


ABG pCO2  47 H    (35-45)  mmHg


 


ABG pO2  207 H    ()  mmHg


 


ABG Total CO2  26 H    (19-24)  mmol/L


 


ABG O2 Saturation  99.0 H    (94-97)  %


 


ABG Hematocrit     (34.0-46.0)  %


 


ABG Potassium     (3.4-4.5)  mmol/L


 


ABG Ionized Calcium     (4.5-5.3)  mg/dL


 


ABG Glucose     (75-99)  mg/dL


 


Hemoglobin     (11.4-16.0)  gm/dL


 


Sodium     (137-145)  mmol/L


 


Chloride     ()  mmol/L


 


Glucose     (74-99)  mg/dL


 


POC Glucose (mg/dL)   113 H   (75-99)  mg/dL


 


Calcium     (8.4-10.2)  mg/dL


 


Magnesium     (1.6-2.3)  mg/dL


 


AST     (14-36)  U/L


 


Total Protein     (6.3-8.2)  g/dL


 


Albumin     (3.5-5.0)  g/dL


 


TSH     (0.465-4.680)  mIU/L


 


Arterial Blood Potassium     (3.4-4.5)  mmol/L


 


Arterial Blood Glucose     (75-99)  mg/dL


 


Crossmatch     














  05/27/22 05/27/22 05/27/22 Range/Units





  14:09 15:12 15:51 


 


WBC     (3.8-10.6)  k/uL


 


RBC     (3.80-5.40)  m/uL


 


Hgb     (11.4-16.0)  gm/dL


 


Hct     (34.0-46.0)  %


 


Neutrophils #     (1.3-7.7)  k/uL


 


Lymphocytes #     (1.0-4.8)  k/uL


 


ABG pH    7.33 L  (7.35-7.45)  


 


ABG pCO2    47 H  (35-45)  mmHg


 


ABG pO2    141 H  ()  mmHg


 


ABG Total CO2    26 H  (19-24)  mmol/L


 


ABG O2 Saturation    98.3 H  (94-97)  %


 


ABG Hematocrit     (34.0-46.0)  %


 


ABG Potassium     (3.4-4.5)  mmol/L


 


ABG Ionized Calcium     (4.5-5.3)  mg/dL


 


ABG Glucose     (75-99)  mg/dL


 


Hemoglobin     (11.4-16.0)  gm/dL


 


Sodium     (137-145)  mmol/L


 


Chloride     ()  mmol/L


 


Glucose     (74-99)  mg/dL


 


POC Glucose (mg/dL)  128 H  130 H   (75-99)  mg/dL


 


Calcium     (8.4-10.2)  mg/dL


 


Magnesium     (1.6-2.3)  mg/dL


 


AST     (14-36)  U/L


 


Total Protein     (6.3-8.2)  g/dL


 


Albumin     (3.5-5.0)  g/dL


 


TSH     (0.465-4.680)  mIU/L


 


Arterial Blood Potassium     (3.4-4.5)  mmol/L


 


Arterial Blood Glucose     (75-99)  mg/dL


 


Crossmatch     














  05/27/22 05/27/22 05/27/22 Range/Units





  16:05 18:02 18:06 


 


WBC    15.9 H  (3.8-10.6)  k/uL


 


RBC     (3.80-5.40)  m/uL


 


Hgb    11.2 L  (11.4-16.0)  gm/dL


 


Hct     (34.0-46.0)  %


 


Neutrophils #    14.5 H  (1.3-7.7)  k/uL


 


Lymphocytes #    0.7 L  (1.0-4.8)  k/uL


 


ABG pH     (7.35-7.45)  


 


ABG pCO2     (35-45)  mmHg


 


ABG pO2     ()  mmHg


 


ABG Total CO2     (19-24)  mmol/L


 


ABG O2 Saturation     (94-97)  %


 


ABG Hematocrit     (34.0-46.0)  %


 


ABG Potassium     (3.4-4.5)  mmol/L


 


ABG Ionized Calcium     (4.5-5.3)  mg/dL


 


ABG Glucose     (75-99)  mg/dL


 


Hemoglobin     (11.4-16.0)  gm/dL


 


Sodium     (137-145)  mmol/L


 


Chloride     ()  mmol/L


 


Glucose     (74-99)  mg/dL


 


POC Glucose (mg/dL)  140 H  144 H   (75-99)  mg/dL


 


Calcium     (8.4-10.2)  mg/dL


 


Magnesium     (1.6-2.3)  mg/dL


 


AST     (14-36)  U/L


 


Total Protein     (6.3-8.2)  g/dL


 


Albumin     (3.5-5.0)  g/dL


 


TSH     (0.465-4.680)  mIU/L


 


Arterial Blood Potassium     (3.4-4.5)  mmol/L


 


Arterial Blood Glucose     (75-99)  mg/dL


 


Crossmatch     














  05/27/22 05/27/22 05/27/22 Range/Units





  19:03 19:50 21:12 


 


WBC     (3.8-10.6)  k/uL


 


RBC     (3.80-5.40)  m/uL


 


Hgb     (11.4-16.0)  gm/dL


 


Hct     (34.0-46.0)  %


 


Neutrophils #     (1.3-7.7)  k/uL


 


Lymphocytes #     (1.0-4.8)  k/uL


 


ABG pH     (7.35-7.45)  


 


ABG pCO2     (35-45)  mmHg


 


ABG pO2     ()  mmHg


 


ABG Total CO2     (19-24)  mmol/L


 


ABG O2 Saturation     (94-97)  %


 


ABG Hematocrit     (34.0-46.0)  %


 


ABG Potassium     (3.4-4.5)  mmol/L


 


ABG Ionized Calcium     (4.5-5.3)  mg/dL


 


ABG Glucose     (75-99)  mg/dL


 


Hemoglobin     (11.4-16.0)  gm/dL


 


Sodium     (137-145)  mmol/L


 


Chloride     ()  mmol/L


 


Glucose     (74-99)  mg/dL


 


POC Glucose (mg/dL)  126 H  130 H  129 H  (75-99)  mg/dL


 


Calcium     (8.4-10.2)  mg/dL


 


Magnesium     (1.6-2.3)  mg/dL


 


AST     (14-36)  U/L


 


Total Protein     (6.3-8.2)  g/dL


 


Albumin     (3.5-5.0)  g/dL


 


TSH     (0.465-4.680)  mIU/L


 


Arterial Blood Potassium     (3.4-4.5)  mmol/L


 


Arterial Blood Glucose     (75-99)  mg/dL


 


Crossmatch     














  05/27/22 05/27/22 05/28/22 Range/Units





  22:12 23:02 00:14 


 


WBC     (3.8-10.6)  k/uL


 


RBC     (3.80-5.40)  m/uL


 


Hgb     (11.4-16.0)  gm/dL


 


Hct     (34.0-46.0)  %


 


Neutrophils #     (1.3-7.7)  k/uL


 


Lymphocytes #     (1.0-4.8)  k/uL


 


ABG pH     (7.35-7.45)  


 


ABG pCO2     (35-45)  mmHg


 


ABG pO2     ()  mmHg


 


ABG Total CO2     (19-24)  mmol/L


 


ABG O2 Saturation     (94-97)  %


 


ABG Hematocrit     (34.0-46.0)  %


 


ABG Potassium     (3.4-4.5)  mmol/L


 


ABG Ionized Calcium     (4.5-5.3)  mg/dL


 


ABG Glucose     (75-99)  mg/dL


 


Hemoglobin     (11.4-16.0)  gm/dL


 


Sodium     (137-145)  mmol/L


 


Chloride     ()  mmol/L


 


Glucose     (74-99)  mg/dL


 


POC Glucose (mg/dL)  125 H  122 H  122 H  (75-99)  mg/dL


 


Calcium     (8.4-10.2)  mg/dL


 


Magnesium     (1.6-2.3)  mg/dL


 


AST     (14-36)  U/L


 


Total Protein     (6.3-8.2)  g/dL


 


Albumin     (3.5-5.0)  g/dL


 


TSH     (0.465-4.680)  mIU/L


 


Arterial Blood Potassium     (3.4-4.5)  mmol/L


 


Arterial Blood Glucose     (75-99)  mg/dL


 


Crossmatch     














  05/28/22 05/28/22 05/28/22 Range/Units





  01:05 02:05 03:02 


 


WBC     (3.8-10.6)  k/uL


 


RBC     (3.80-5.40)  m/uL


 


Hgb     (11.4-16.0)  gm/dL


 


Hct     (34.0-46.0)  %


 


Neutrophils #     (1.3-7.7)  k/uL


 


Lymphocytes #     (1.0-4.8)  k/uL


 


ABG pH     (7.35-7.45)  


 


ABG pCO2     (35-45)  mmHg


 


ABG pO2     ()  mmHg


 


ABG Total CO2     (19-24)  mmol/L


 


ABG O2 Saturation     (94-97)  %


 


ABG Hematocrit     (34.0-46.0)  %


 


ABG Potassium     (3.4-4.5)  mmol/L


 


ABG Ionized Calcium     (4.5-5.3)  mg/dL


 


ABG Glucose     (75-99)  mg/dL


 


Hemoglobin     (11.4-16.0)  gm/dL


 


Sodium     (137-145)  mmol/L


 


Chloride     ()  mmol/L


 


Glucose     (74-99)  mg/dL


 


POC Glucose (mg/dL)  118 H  122 H  122 H  (75-99)  mg/dL


 


Calcium     (8.4-10.2)  mg/dL


 


Magnesium     (1.6-2.3)  mg/dL


 


AST     (14-36)  U/L


 


Total Protein     (6.3-8.2)  g/dL


 


Albumin     (3.5-5.0)  g/dL


 


TSH     (0.465-4.680)  mIU/L


 


Arterial Blood Potassium     (3.4-4.5)  mmol/L


 


Arterial Blood Glucose     (75-99)  mg/dL


 


Crossmatch     














  05/28/22 05/28/22 05/28/22 Range/Units





  04:00 04:15 04:15 


 


WBC    12.3 H  (3.8-10.6)  k/uL


 


RBC    3.45 L  (3.80-5.40)  m/uL


 


Hgb    10.0 L  (11.4-16.0)  gm/dL


 


Hct    30.8 L  (34.0-46.0)  %


 


Neutrophils #    10.1 H  (1.3-7.7)  k/uL


 


Lymphocytes #     (1.0-4.8)  k/uL


 


ABG pH     (7.35-7.45)  


 


ABG pCO2     (35-45)  mmHg


 


ABG pO2     ()  mmHg


 


ABG Total CO2     (19-24)  mmol/L


 


ABG O2 Saturation     (94-97)  %


 


ABG Hematocrit     (34.0-46.0)  %


 


ABG Potassium     (3.4-4.5)  mmol/L


 


ABG Ionized Calcium     (4.5-5.3)  mg/dL


 


ABG Glucose     (75-99)  mg/dL


 


Hemoglobin     (11.4-16.0)  gm/dL


 


Sodium   133 L   (137-145)  mmol/L


 


Chloride     ()  mmol/L


 


Glucose   107 H   (74-99)  mg/dL


 


POC Glucose (mg/dL)  131 H    (75-99)  mg/dL


 


Calcium   7.6 L   (8.4-10.2)  mg/dL


 


Magnesium     (1.6-2.3)  mg/dL


 


AST   57 H   (14-36)  U/L


 


Total Protein   5.1 L   (6.3-8.2)  g/dL


 


Albumin   2.9 L   (3.5-5.0)  g/dL


 


TSH   0.062 L   (0.465-4.680)  mIU/L


 


Arterial Blood Potassium     (3.4-4.5)  mmol/L


 


Arterial Blood Glucose     (75-99)  mg/dL


 


Crossmatch     














  05/28/22 05/28/22 05/28/22 Range/Units





  05:17 06:02 06:55 


 


WBC     (3.8-10.6)  k/uL


 


RBC     (3.80-5.40)  m/uL


 


Hgb     (11.4-16.0)  gm/dL


 


Hct     (34.0-46.0)  %


 


Neutrophils #     (1.3-7.7)  k/uL


 


Lymphocytes #     (1.0-4.8)  k/uL


 


ABG pH     (7.35-7.45)  


 


ABG pCO2     (35-45)  mmHg


 


ABG pO2     ()  mmHg


 


ABG Total CO2     (19-24)  mmol/L


 


ABG O2 Saturation     (94-97)  %


 


ABG Hematocrit     (34.0-46.0)  %


 


ABG Potassium     (3.4-4.5)  mmol/L


 


ABG Ionized Calcium     (4.5-5.3)  mg/dL


 


ABG Glucose     (75-99)  mg/dL


 


Hemoglobin     (11.4-16.0)  gm/dL


 


Sodium     (137-145)  mmol/L


 


Chloride     ()  mmol/L


 


Glucose     (74-99)  mg/dL


 


POC Glucose (mg/dL)  111 H  117 H  170 H  (75-99)  mg/dL


 


Calcium     (8.4-10.2)  mg/dL


 


Magnesium     (1.6-2.3)  mg/dL


 


AST     (14-36)  U/L


 


Total Protein     (6.3-8.2)  g/dL


 


Albumin     (3.5-5.0)  g/dL


 


TSH     (0.465-4.680)  mIU/L


 


Arterial Blood Potassium     (3.4-4.5)  mmol/L


 


Arterial Blood Glucose     (75-99)  mg/dL


 


Crossmatch     














Assessment and Plan


Plan: 











Mitral valve annuloplasty, surgery was done for severe symptomatically mitral 

regurgitation the patient is postop day #1.  The patient is currently 

hemodynamically stable on no pressors.  Adequate cardiac output.  Adequate 

hemodynamics.  The patient remains hemodynamically stable on the first day 

postop.  The patient is doing well.  She did go into atrial fibrillation and 

expected outcome of surgery





New onset A. fib expected outcome of surgery with a controlled rate currently on

amiodarone loading.  No anticoagulants.  Hemodynamically stable.





Post thoracotomy,  extubated and the patient currently has right pleural and 

mediastinal chest tube in place.  No evidence of any pneumothorax.  Currently on

3 L of oxygen by nasal cannula.





Hypothyroidism





Hypertension





Colonic polyps





Chronic anxiety





Plan





Currently on 3 L O2 nasal cannula, continue using incentive spirometer


Chest x-ray was reviewed


Amiodarone loading and subsequent maintenance


Anticoagulation is atrial fibrillation continues to be persistent


[facility] catheter to be removed today


Rest of the blood work is all within normal limits


Advance diet


Adequate pain control


Insulin drip at 2 units an hour


We'll continue to follow.

## 2022-05-28 NOTE — P.CRDCN
History of Present Illness


History of present illness: 





HISTORY OF PRESENTING ILLNESS


Patient is a pleasant 62 -year-old female with history of hypertension, GERD, 

hypothyroidism, severe mitral regurgitation.  She has been having increased 

shortness breath over the last 3 months and therefore underwent workup and found

have severe mitral regurgitation.  She underwent mitral valve repair 05/27/2022 

with a 28 mm [employer] AnnuloFlex band, left atrial appendage clip without 

incident.  She was extubated and currently this morning doing well and admitting

to some chest pain around the incision worse with deep inspiration however not 

much shortness breath.  She did have a brief episode of atrial fibrillation was 

placed on amiodarone with conversion back to sinus rhythm.  She did admit to 

palpitations during this episode and admits to some palpitations at home or last

few years which felt similar to this.  No history of stroke or TIA.  She was 

noted to be mildly hyperthyroid on blood work.





REVIEW OF SYSTEMS


At the time of my exam:


CONSTITUTIONAL: Denies fever or chills.


CARDIOVASCULAR: +chest pain, +shortness of breath, no orthopnea, PND 

+palpitations.


RESPIRATORY: Denies cough. 


GASTROINTESTINAL: Denies abdominal pain, diarrhea, constipation, nausea or 

vomiting.


MUSCULOSKELETAL: Denies myalgias.


NEUROLOGIC: Denies numbness, tingling or weakness.


ENDOCRINE: Denies fatigue, weight change,  polydipsia or polyurina.


GENITOURINARY: Denies burning, hematuria or urgency with micturation.


HEMATOLOGIC: Denies history of anemia or bleeding. 





PHYSICAL EXAMINATION


Vital signs reviewed.


CONSTITUTIONAL: No apparent distress. 


HEENT: Head is normocephalic. Pupils are equal, round. Sclerae anicteric. Mucous

membranes of the mouth are moist.  No JVD. No carotid bruit.


CHEST EXAMINATION: Lungs are clear to auscultation. No chest wall tenderness is 

noted on palpation or with deep breathing. 


HEART EXAMINATION: Regular rate and rhythm. S1, S2 heard. No murmurs, gallops or

rub.


ABDOMEN: Soft, nontender. Positive bowel sounds.


EXTREMITIES: 2+ peripheral pulses, no lower extremity edema and no calf 

tenderness.


NEUROLOGIC EXAMINATION: Patient is awake, alert and oriented x3. 





Assessment


1.  Severe mitral regurgitation status post mitral valve repair


2.  Paroxysmal atrial fibrillation, likely postoperative however additional 

history of similar palpitations in the past


3.  Hypertension


4.  Hypothyroidism currently hyperthyroid likely iatrogenic


5.  Palpitations





Plan:


Patient has been doing well after surgery.  She did have an episode of atrial 

fibrillation likely related to postoperative A. fib however also has history of 

palpitations.  This is likely exacerbated by hyperthyroid state.  Continue with 

current regimen.  Continue amiodarone.  Continue supportive care.








Past Medical History


Past Medical History: GERD/Reflux, Hypertension, Mitral Valve Prolapse (MVP), 

Osteoarthritis (OA), Pneumonia, Thyroid Disorder


Additional Past Medical History / Comment(s): Mitral valve prolapse, hx. colon 

polyps, SOB w/exertion although a little better recently, needing knee 

replacement, past hx. pneumonia 2020, possible sleep apnea


History of Any Multi-Drug Resistant Organisms: None Reported


Past Surgical History: Appendectomy, Heart Catheterization, Tubal Ligation


Additional Past Surgical History / Comment(s): parotid gland tumor removed, 

colonoscopies, fallopian tubes removed, ovaries removed 2008


Past Anesthesia/Blood Transfusion Reactions: No Reported Reaction, Family 

History of Problems w/ Anesthesia


Additional Past Anesthesia/Blood Transfusion Reaction / Comment(s): 16 y.o. son 

had some chest pain that was attributed to anesthesia per pt. but he's had 

further surg. w/no problems


Smoking Status: Never smoker





- Past Family History


  ** Mother Sister(s)


Family Medical History: Cancer


Additional Family Medical History / Comment(s): mom colon, sister brain & breast

cancer





Medications and Allergies


                                Home Medications











 Medication  Instructions  Recorded  Confirmed  Type


 


Levothyroxine Sodium [Synthroid] 150 mcg PO DAILY 10/14/17 05/26/22 History


 


Ibuprofen [Advil] 200 - 400 mg PO Q6HR PRN 04/10/19 05/24/22 History


 


ALPRAZolam [Xanax] 0.25 mg PO HS PRN 07/21/20 05/24/22 History


 


Losartan [Cozaar] 50 mg PO DAILY #30 tab 07/23/20 05/24/22 Rx


 


hydroCHLOROthiazide [Hydrodiuril] 12.5 mg PO DAILY #30 cap 07/23/20 05/24/22 Rx


 


Omeprazole [PriLOSEC] 20 mg PO DAILY PRN 05/24/22 05/24/22 History


 


Acetaminophen [Tylenol Arthritis] 650 mg PO Q6H PRN 05/26/22 05/26/22 History


 


Mupirocin [Mupirocin 2%] 1 applic NASAL BID #1 tub 05/26/22 05/26/22 Rx








                                    Allergies











Allergy/AdvReac Type Severity Reaction Status Date / Time


 


cortisone Allergy  Rash/Hives Verified 05/27/22 05:52


 


hyaluronic acid Allergy  Itching Verified 05/27/22 05:52





[From Hyalgan]   @injection  





   site  


 


hydrocodone AdvReac  SHORTNESS Verified 05/27/22 05:52





   OF BREATHE  


 


propoxyphene napsylate AdvReac  SHORTNESS Verified 05/27/22 05:52





[From Darvocet-N]   OF BREATHE  














Physical Exam


Vitals: 


                                   Vital Signs











  Temp Pulse Resp BP Pulse Ox FiO2


 


 05/28/22 07:00   96  18   93 L 


 


 05/28/22 06:00   78  15   87 L 


 


 05/28/22 05:00  98.1 F  75  24   93 L 


 


 05/28/22 04:00   76  14   98 


 


 05/28/22 03:00   72  10 L   93 L 


 


 05/28/22 02:00   68  14   94 L 


 


 05/28/22 01:00   76  16  138/75  94 L 


 


 05/28/22 00:00   75  18   95 


 


 05/27/22 23:25   75  15   94 L 


 


 05/27/22 23:00   77  13   95 


 


 05/27/22 22:00   79  16   95 


 


 05/27/22 21:00   82  14   95 


 


 05/27/22 20:07   83    


 


 05/27/22 20:00   79  19  122/62  97 


 


 05/27/22 19:54   79    


 


 05/27/22 19:00   78  13   98 


 


 05/27/22 18:45   77  8 L   98 


 


 05/27/22 18:30   77  12   95 


 


 05/27/22 18:15   80  6 L   88 L 


 


 05/27/22 18:00   79  6 L   96 


 


 05/27/22 17:45   76  16   95 


 


 05/27/22 17:30   73  13   95 


 


 05/27/22 17:15   75  8 L   94 L 


 


 05/27/22 17:00   75  14   95 


 


 05/27/22 16:45   75  17   94 L 


 


 05/27/22 16:30   74  6 L   98 


 


 05/27/22 16:17   72  16   


 


 05/27/22 16:15   67  7 L   100 


 


 05/27/22 16:08   71  16   


 


 05/27/22 16:00  98.1 F  70  6 L   97 


 


 05/27/22 15:45   67  15   100 


 


 05/27/22 15:30   64  17   100 


 


 05/27/22 15:24       50


 


 05/27/22 15:15   65  20   100 


 


 05/27/22 15:00   67  20   100 


 


 05/27/22 14:50       50


 


 05/27/22 14:45   64  13   100 


 


 05/27/22 14:30   63  13   100 


 


 05/27/22 14:15   63  12   100 


 


 05/27/22 14:00   70  12   100 


 


 05/27/22 13:47   70  12   


 


 05/27/22 13:45   70  12   100 


 


 05/27/22 13:36   70  12   


 


 05/27/22 13:30   70  13   100 


 


 05/27/22 13:15   70  13   100 


 


 05/27/22 13:00  95.9 F L  70  12   100  100 05/27/22 12:52       60


 


 05/27/22 12:45   69  12   100 


 


 05/27/22 12:30   70  9 L   100 


 


 05/27/22 12:23       100


 


 05/27/22 12:15   69  8 L   


 


 05/27/22 12:13   0 L  21 05/27/22 12:10       100








                                Intake and Output











 05/27/22 05/28/22 05/28/22





 22:59 06:59 14:59


 


Intake Total 413.672 3723.291 59


 


Output Total 1215 470 15


 


Balance -321.743 782.291 44


 


Intake:   


 


   542 59


 


    Lactated Ringers 1,000 ml 150 400 50





    @ 20 mls/hr IV .Q24H TERRA   





    Rx#:[id number]   


 


    cardiac output 100 70 


 


    pressure bags 72 72 9


 


  Intake, IV Titration 451.257 60.291 





  Amount   


 


    ACETAMINOPHEN IV (For   





    ) 1,000 mg In Empty Bag 1   





    bag @ 400 mls/hr IVPB   





    Q6H TERRA Rx#:[id number]   


 


    Clevidipine Butyrate 25 43.867 53.499 





    mg In Empty Bag 1 bag @ 1   





    MG/HR 2 mls/hr IV .Q24H   





    TERRA Rx#:[id number]   


 


    Insulin Regular 100 unit 4.705 6.792 





    In Sodium Chloride 0.9%   





    100 ml @ Per Protocol IV   





    .Q0M TERRA Rx#:[id number]   


 


    Lactated Ringers 1,000 ml 250  





    @ 20 mls/hr IV .Q24H TERRA   





    Rx#:[id number]   


 


    ceFAZolin 2 gm In Sodium 50  





    Chloride 0.9% 50 ml @ 100   





    mls/hr IVPB Q8HR [facility] Rx#   





    :[id number]   


 


    propofoL 1,000 mg In 2.685  





    Empty Bag 1 bag @ Titrate   





    IV .Q0M [facility] Rx#:   





    [id number]   


 


  Oral 120 650 


 


Output:   


 


  Chest Tube Drainage 190 120 0


 


    mediastinal and right 190 120 0





    pleural   


 


  Urine 1025 350 15


 


Other:   


 


  Voiding Method Indwelling Catheter Indwelling Catheter 


 


  Weight  96.4 kg 








                         ABP, PAP, CO, CI - Last 8 Hours











Arterial Blood Pressure        100/51


 


Arterial Blood Pressure        121/54


 


Arterial Blood Pressure        136/54


 


Arterial Blood Pressure        127/51


 


Arterial Blood Pressure        117/48


 


Arterial Blood Pressure        123/50


 


Arterial Blood Pressure        137/57


 


Arterial Blood Pressure        131/53


 


Pulmonary Artery Pressure      31/14


 


Pulmonary Artery Pressure      31/17


 


Pulmonary Artery Pressure      34/13


 


Pulmonary Artery Pressure      34/13


 


Pulmonary Artery Pressure      31/12


 


Pulmonary Artery Pressure      31/11


 


Pulmonary Artery Pressure      36/14


 


Pulmonary Artery Pressure      31/11


 


Cardiac Output                 5.3


 


Cardiac Output                 4.8


 


Cardiac Index                  2.7


 


Cardiac Index                  2.4

















Results





                                 05/28/22 04:15





                                 05/28/22 04:15


                                 Cardiac Enzymes











  05/27/22 05/28/22 Range/Units





  12:15 04:15 


 


AST  43 H  57 H  (14-36)  U/L








                                   Coagulation











  05/27/22 Range/Units





  12:15 


 


PT  10.9  (9.0-12.0)  sec


 


APTT  24.7  (22.0-30.0)  sec








                                       CBC











  05/27/22 05/27/22 05/27/22 Range/Units





  12:15 14:07 18:06 


 


WBC  18.8 H  17.6 H  15.9 H  (3.8-10.6)  k/uL


 


RBC  3.79 L  3.93  3.83  (3.80-5.40)  m/uL


 


Hgb  10.7 L  11.3 L  11.2 L  (11.4-16.0)  gm/dL


 


Hct  33.9 L  35.5  34.5  (34.0-46.0)  %


 


Plt Count  219  262  282  (150-450)  k/uL














  05/28/22 Range/Units





  04:15 


 


WBC  12.3 H  (3.8-10.6)  k/uL


 


RBC  3.45 L  (3.80-5.40)  m/uL


 


Hgb  10.0 L  (11.4-16.0)  gm/dL


 


Hct  30.8 L  (34.0-46.0)  %


 


Plt Count  241  (150-450)  k/uL








                          Comprehensive Metabolic Panel











  05/27/22 05/28/22 Range/Units





  12:15 04:15 


 


Sodium  137  133 L  (137-145)  mmol/L


 


Potassium  4.3  4.1  (3.5-5.1)  mmol/L


 


Chloride  109 H  105  ()  mmol/L


 


Carbon Dioxide  22  25  (22-30)  mmol/L


 


BUN  16  14  (7-17)  mg/dL


 


Creatinine  0.75  0.67  (0.52-1.04)  mg/dL


 


Glucose  112 H  107 H  (74-99)  mg/dL


 


Calcium  8.2 L  7.6 L  (8.4-10.2)  mg/dL


 


AST  43 H  57 H  (14-36)  U/L


 


ALT  18  17  (4-34)  U/L


 


Alkaline Phosphatase  50  44  ()  U/L


 


Total Protein  5.0 L  5.1 L  (6.3-8.2)  g/dL


 


Albumin  2.9 L  2.9 L  (3.5-5.0)  g/dL








                               Current Medications











Generic Name Dose Route Start Last Admin





  Trade Name Freq  PRN Reason Stop Dose Admin


 


Acetaminophen  650 mg  05/28/22 07:42 





  Acetaminophen Tab 325 Mg Tab  PO  





  Q4HR PRN  





  Fever and/ or Pain  


 


Albuterol/Ipratropium  3 ml  05/27/22 12:17 





  Ipratropium-Albuterol 3 Ml Neb  INHALATION  





  RT-Q2H PRN  





  Shortness Of Breath Or Wheezing  


 


Albuterol/Ipratropium  3 ml  05/27/22 20:00  05/27/22 19:54





  Ipratropium-Albuterol 3 Ml Neb  INHALATION   3 ml





  RT-QID TERRA   Administration


 


Aspirin  325 mg  05/28/22 09:00 





  Aspirin 325 Mg Tab  PO  





  DAILY [facility]  


 


Atorvastatin Calcium  40 mg  05/28/22 09:00 





  Atorvastatin 40 Mg Tab  PO  





  DAILY [facility]  


 


Benzocaine/Menthol  1 each  05/27/22 12:17 





  Benzocaine/Menthol Lozeng 1 Each Lozenge  MUCOUS MEM  





  Q2H PRN  





  Sore Throat  


 


Bisacodyl  10 mg  05/28/22 09:00 





  Bisacodyl 10 Mg Supp  RECTAL  





  DAILY PRN  





  Constipation  


 


Clopidogrel Bisulfate  75 mg  05/28/22 09:00 





  Clopidogrel 75 Mg Tab  PO  





  DAILY TERRA  


 


Heparin Sodium (Porcine)  5,000 unit  05/27/22 16:00  05/28/22 00:49





  Heparin Sodium,Porcine/Pf 5,000 Unit/0.5 Ml Syringe  SQ   5,000 unit





  Q8HR TERRA   Administration


 


Hydralazine HCl  10 mg  05/27/22 12:17 





  Hydralazine Hcl 20 Mg/Ml 1 Ml Vial  IVP  





  Q1H PRN  





  Blood Pressure - High  


 


Clevidipine 25 mg/ IV Solution  50 mls @ 2 mls/hr  05/27/22 12:17  05/28/22 

06:40





  IV   0 mg/hr





  .Q24H TERRA   0 mls/hr





    Titration





  Protocol  





  1 MG/HR  


 


Albumin Human 250 ml/ IV  250 mls @ 250 mls/hr  05/27/22 12:17 





  Solution  IVPB  05/29/22 12:18 





  Q1HR PRN  





  For Volume  





  Protocol  


 


Lactated Ringer's  1,000 mls @ 20 mls/hr  05/27/22 12:17  05/27/22 12:43





  Lactated Ringers  IV   50 mls/hr





  .Q24H TERRA   Administration


 


Amiodarone HCl 150 mg/  103 mls @ 618 mls/hr  05/27/22 12:17  05/28/22 06:51





  Dextrose/Water  IV   618 mls/hr





  .Q10M PRN   Administration





  A.FIB/FLUTTER  





  Protocol  


 


Amiodarone HCl 360 mg/  207.2 mls @ 34.533 mls/hr  05/27/22 12:17  05/28/22 

07:01





  Dextrose/Water  IV   1 mg/min





  .Q6H PRN   34.533 mls/hr





  A.FIB/FLUTTER   Administration





  Protocol  





  1 MG/MIN  


 


Amiodarone HCl 450 mg/  250 mls @ 16.667 mls/hr  05/27/22 12:17 





  Dextrose/Water  IV  





  .Q15H PRN  





  A.FIB/FLUTTER  





  Protocol  





  0.5 MG/MIN  


 


Cefazolin Sodium 2 gm/ Sodium  50 mls @ 100 mls/hr  05/27/22 16:00  05/28/22 

00:49





  Chloride  IVPB  05/28/22 08:29  100 mls/hr





  Q8HR TERRA   Administration





  Protocol  


 


Calcium Gluconate/Sodium  100 mls @ 100 mls/hr  05/27/22 12:17 





  Chloride 2 gm/ IV Solution  IVPB  06/03/22 12:18 





  ONCE PRN  





  Ionized Calcium less than 4.4  


 


Insulin Human Regular 100 unit  101 mls @ 0 mls/hr  05/27/22 13:00  05/28/22 

06:55





  / Sodium Chloride  IV   2 units/hr





  .Q0M TERRA   2.02 mls/hr





    Titration





  Protocol  





  Per Protocol  


 


Ketorolac Tromethamine  15 mg  05/27/22 18:00  05/28/22 05:11





  Ketorolac 15 Mg/Ml 1 Ml Vial  IVP  05/30/22 13:45  15 mg





  Q6HR TERRA   Administration


 


Magnesium Hydroxide  2,400 mg  05/28/22 09:00 





  Magnesium Hydroxide 2,400 Mg/10 Ml Cup  PO  





  BID PRN  





  Constipation  


 


Metoclopramide HCl  10 mg  05/27/22 12:17  05/28/22 07:16





  Metoclopramide 5 Mg/Ml 2 Ml Vial  IVP   10 mg





  Q4H PRN   Administration





  Nausea And Vomiting  


 


Metoprolol Tartrate  25 mg  05/28/22 21:00 





  Metoprolol Tartrate 25 Mg Tab  PO  





  BID [facility]  


 


Miscellaneous Information  1 each  05/27/22 12:17 





  Potassium Replacement Protocol 1 Each Misc  MISCELLANE  





  DAILY PRN  





  Per Protocol  





  Protocol  


 


Miscellaneous Information  1 each  05/27/22 12:17 





  Magnesium Replacement Protocol 1 Each Misc  MISCELLANE  





  DAILY PRN  





  Per Protocol  





  Protocol  


 


Miscellaneous Information  1 each  05/27/22 12:17 





  Phosphorus Replacement Protoco 1 Each Misc  MISCELLANE  





  DAILY PRN  





  Per Protocol  





  Protocol  


 


Mupirocin  1 applic  05/27/22 21:00  05/27/22 21:27





  Mupirocin 2% Oint 22 Gm Tube  NASAL  05/30/22 21:01  1 applic





  BID TERRA   Administration


 


Ondansetron HCl  4 mg  05/27/22 12:17 





  Ondansetron 4 Mg/2 Ml Vial  IVP  





  Q6HR PRN  





  Nausea And Vomiting  


 


Oxycodone HCl  10 mg  05/27/22 12:17  05/28/22 04:41





  Oxycodone Hcl 5 Mg Tab  PO   10 mg





  Q4H PRN   Administration





  Severe Pain  


 


Oxycodone HCl  5 mg  05/27/22 12:17 





  Oxycodone Hcl 5 Mg Tab  PO  





  Q4H PRN  





  Moderate Pain  


 


Pantoprazole Sodium  40 mg  05/28/22 09:00 





  Pantoprazole 40 Mg/10 Ml Vial  IVP  05/28/22 10:00 





  DAILY [facility]  


 


Pantoprazole Sodium  40 mg  05/29/22 07:30 





  Pantoprazole 40 Mg Tablet  PO  





  AC-BID TERRA  


 


Senna/Docusate Sodium  2 each  05/28/22 21:00 





  Sennosides-Docusate Sodium 1 Each Tab  PO  





  HS TERRA  


 


Sodium Chloride  10 ml  05/27/22 21:00  05/27/22 21:27





  Sodium Chloride 0.9% Flush 10 Ml Syringe  IV   10 ml





  BID TERRA   Administration








                                Intake and Output











 05/27/22 05/28/22 05/28/22





 22:59 06:59 14:59


 


Intake Total 470.383 8604.291 59


 


Output Total 1215 470 15


 


Balance -321.743 782.291 44


 


Intake:   


 


   542 59


 


    Lactated Ringers 1,000 ml 150 400 50





    @ 20 mls/hr IV .Q24H TERRA   





    Rx#:[id number]   


 


    cardiac output 100 70 


 


    pressure bags 72 72 9


 


  Intake, IV Titration 451.257 60.291 





  Amount   


 


    ACETAMINOPHEN IV (For   





    ) 1,000 mg In Empty Bag 1   





    bag @ 400 mls/hr IVPB   





    Q6H TERRA Rx#:[id number]   


 


    Clevidipine Butyrate 25 43.867 53.499 





    mg In Empty Bag 1 bag @ 1   





    MG/HR 2 mls/hr IV .Q24H   





    TERRA Rx#:[id number]   


 


    Insulin Regular 100 unit 4.705 6.792 





    In Sodium Chloride 0.9%   





    100 ml @ Per Protocol IV   





    .Q0M TERRA Rx#:[id number]   


 


    Lactated Ringers 1,000 ml 250  





    @ 20 mls/hr IV .Q24H TERRA   





    Rx#:[id number]   


 


    ceFAZolin 2 gm In Sodium 50  





    Chloride 0.9% 50 ml @ 100   





    mls/hr IVPB Q8HR TERRA Rx#   





    :[id number]   


 


    propofoL 1,000 mg In 2.685  





    Empty Bag 1 bag @ Titrate   





    IV .Q0M TERRA Rx#:   





    [id number]   


 


  Oral 120 650 


 


Output:   


 


  Chest Tube Drainage 190 120 0


 


    mediastinal and right 190 120 0





    pleural   


 


  Urine 1025 350 15


 


Other:   


 


  Voiding Method Indwelling Catheter Indwelling Catheter 


 


  Weight  96.4 kg 








                                        





                                 05/28/22 04:15 





                                 05/28/22 04:15

## 2022-05-28 NOTE — XR
EXAMINATION TYPE: XR chest 1V portable

 

DATE OF EXAM: 5/28/2022

 

COMPARISON: 5/27/2022

 

INDICATION: Postop cardiac surgery

 

TECHNIQUE: Single frontal view of the chest is obtained.

 

FINDINGS:  

The heart size is borderline prominent.  

The pulmonary vasculature is normal.  

The lungs are clear.  

Endotracheal tube and nasogastric tube is been removed. Lempster-Lam catheter on the right has the tip i
n the main pulmonary artery region. Mediastinal tube remains present. Right-sided chest tube remains 
present. No pneumothorax is evident on the semiupright position.

 

IMPRESSION:  

1. No suspicious acute pulmonary process.

2. Lines and catheters discussed above

## 2022-05-28 NOTE — P.CONS
History of Present Illness





- Reason for Consult


Consult date: 05/28/22





- Chief Complaint


Medical management





- History of Present Illness





62-year-old female patient, history of hypertension, hypothyroidism and mitral 

valve prolapse, underwent a mitral valve annuloplasty.  The patient was brought 

into the intensive care unit postoperatively and was intubated and sedated. 

Initial blood gases showed a pH of 7.31 with a pCO2 of 47 and pO2 of 207.  The 

patient has chest tubes 1 mediastinal and 1 right pleural.  The postop chest x-

ray showed no acute abnormalities.  No evidence of pneumothorax.  


Patient was extubated yesterday and is currently on nasal cannula 


Earlier this morning, the patient went into atrial fibrillation with rapid 

ventricular response, and expected outcome of surgery, and the patient was 

started on amiodarone drip loading and subsequent maintenance will be following.

 The patient is also on beta blockers in the form of metoprolol and the dose has

been increased up to 25 mg by mouth twice a day.  No anticoagulants for now.  

Hemodynamically she is stable.  Adequate urine output.  White cell count of 12.3

with hemoglobin of 10 and a platelet count of 241.  BUN is at 40 with a 

creatinine of 0.67 and his sodium level is at 133.  TSH is at 1.49. 





Review of Systems











REVIEW OF SYSTEMS: 


CONSTITUTIONAL: No fever, no malaise, no fatigue. 


HEENT: No recent visual problems or hearing problems. Denied any sore throat. 


CARDIOVASCULAR: No chest pain, orthopnea, PND, no palpitations, no syncope. 


PULMONARY: No shortness of breath, no cough, no hemoptysis. 


GASTROINTESTINAL: No diarrhea, no nausea, no vomiting, no abdominal pain. 


NEUROLOGICAL: No headaches, no weakness, no numbness. 


HEMATOLOGICAL: Denies any bleeding or petechiae. 


GENITOURINARY: Denies any burning micturition, frequency, or urgency. 


MUSCULOSKELETAL/RHEUMATOLOGICAL: Denies any joint pain, swelling, or any muscle 

pain. 


ENDOCRINE: Denies any polyuria or polydipsia. 





The rest of the 14-point review of systems is negative.





Past Medical History


Past Medical History: GERD/Reflux, Hypertension, Mitral Valve Prolapse (MVP), 

Osteoarthritis (OA), Pneumonia, Thyroid Disorder


Additional Past Medical History / Comment(s): Mitral valve prolapse, hx. colon 

polyps, SOB w/exertion although a little better recently, needing knee 

replacement, past hx. pneumonia 2020, possible sleep apnea


History of Any Multi-Drug Resistant Organisms: None Reported


Past Surgical History: Appendectomy, Heart Catheterization, Tubal Ligation


Additional Past Surgical History / Comment(s): parotid gland tumor removed, 

colonoscopies, fallopian tubes removed, ovaries removed 2008


Past Anesthesia/Blood Transfusion Reactions: No Reported Reaction, Family 

History of Problems w/ Anesthesia


Additional Past Anesthesia/Blood Transfusion Reaction / Comm: 14 y.o. son had 

some chest pain that was attributed to anesthesia per pt. but he's had further 

surg. w/no problems


Smoking Status: Never smoker





- Past Family History


  ** Mother Sister(s)


Family Medical History: Cancer


Additional Family Medical History / Comment(s): mom colon, sister brain & breast

cancer





Medications and Allergies


                                Home Medications











 Medication  Instructions  Recorded  Confirmed  Type


 


Levothyroxine Sodium [Synthroid] 150 mcg PO DAILY 10/14/17 05/26/22 History


 


Ibuprofen [Advil] 200 - 400 mg PO Q6HR PRN 04/10/19 05/24/22 History


 


ALPRAZolam [Xanax] 0.25 mg PO HS PRN 07/21/20 05/24/22 History


 


Losartan [Cozaar] 50 mg PO DAILY #30 tab 07/23/20 05/24/22 Rx


 


hydroCHLOROthiazide [Hydrodiuril] 12.5 mg PO DAILY #30 cap 07/23/20 05/24/22 Rx


 


Omeprazole [PriLOSEC] 20 mg PO DAILY PRN 05/24/22 05/24/22 History


 


Acetaminophen [Tylenol Arthritis] 650 mg PO Q6H PRN 05/26/22 05/26/22 History


 


Mupirocin [Mupirocin 2%] 1 applic NASAL BID #1 tub 05/26/22 05/26/22 Rx








                                    Allergies











Allergy/AdvReac Type Severity Reaction Status Date / Time


 


cortisone Allergy  Rash/Hives Verified 05/27/22 05:52


 


hyaluronic acid Allergy  Itching Verified 05/27/22 05:52





[From Hyalgan]   @injection  





   site  


 


hydrocodone AdvReac  SHORTNESS Verified 05/27/22 05:52





   OF BREATHE  


 


propoxyphene napsylate AdvReac  SHORTNESS Verified 05/27/22 05:52





[From Darvocet-N]   OF BREATHE  














Physical Exam


Vitals: 


                                   Vital Signs











  Temp Pulse Resp BP Pulse Ox FiO2


 


 05/28/22 09:03   72    


 


 05/28/22 09:00   71  15  138/75  98 


 


 05/28/22 08:51   71    


 


 05/28/22 08:00  99.5 F  69  14  138/75  95 


 


 05/28/22 07:00   96  18   93 L 


 


 05/28/22 06:00   78  15   87 L 


 


 05/28/22 05:00  98.1 F  75  24   93 L 


 


 05/28/22 04:00   76  14   98 


 


 05/28/22 03:00   72  10 L   93 L 


 


 05/28/22 02:00   68  14   94 L 


 


 05/28/22 01:00   76  16  138/75  94 L 


 


 05/28/22 00:00   75  18   95 


 


 05/27/22 23:25   75  15   94 L 


 


 05/27/22 23:00   77  13   95 


 


 05/27/22 22:00   79  16   95 


 


 05/27/22 21:00   82  14   95 


 


 05/27/22 20:07   83    


 


 05/27/22 20:00   79  19  122/62  97 


 


 05/27/22 19:54   79    


 


 05/27/22 19:00   78  13   98 


 


 05/27/22 18:45   77  8 L   98 


 


 05/27/22 18:30   77  12   95 


 


 05/27/22 18:15   80  6 L   88 L 


 


 05/27/22 18:00   79  6 L   96 


 


 05/27/22 17:45   76  16   95 


 


 05/27/22 17:30   73  13   95 


 


 05/27/22 17:15   75  8 L   94 L 


 


 05/27/22 17:00   75  14   95 


 


 05/27/22 16:45   75  17   94 L 


 


 05/27/22 16:30   74  6 L   98 


 


 05/27/22 16:17   72  16   


 


 05/27/22 16:15   67  7 L   100 


 


 05/27/22 16:08   71  16   


 


 05/27/22 16:00  98.1 F  70  6 L   97 


 


 05/27/22 15:45   67  15   100 


 


 05/27/22 15:30   64  17   100 


 


 05/27/22 15:24       50


 


 05/27/22 15:15   65  20   100 


 


 05/27/22 15:00   67  20   100 


 


 05/27/22 14:50       50


 


 05/27/22 14:45   64  13   100 


 


 05/27/22 14:30   63  13   100 


 


 05/27/22 14:15   63  12   100 


 


 05/27/22 14:00   70  12   100 


 


 05/27/22 13:47   70  12   


 


 05/27/22 13:45   70  12   100 


 


 05/27/22 13:36   70  12   


 


 05/27/22 13:30   70  13   100 


 


 05/27/22 13:15   70  13   100 


 


 05/27/22 13:00  95.9 F L  70  12   100  100


 


 05/27/22 12:52       60


 


 05/27/22 12:45   69  12   100 


 


 05/27/22 12:30   70  9 L   100 


 


 05/27/22 12:23       100


 


 05/27/22 12:15   69  8 L   


 


 05/27/22 12:13   0 L  21 05/27/22 12:10       100








                                Intake and Output











 05/27/22 05/28/22 05/28/22





 22:59 06:59 14:59


 


Intake Total 010.584 1689.291 120.727


 


Output Total 1215 470 65


 


Balance -321.743 782.291 55.727


 


Intake:   


 


   542 118


 


    Lactated Ringers 1,000 ml 150 400 100





    @ 20 mls/hr IV .Q24H TERRA   





    Rx#:[id number]   


 


    cardiac output 100 70 


 


    pressure bags 72 72 18


 


  Intake, IV Titration 451.257 60.291 2.727





  Amount   


 


    ACETAMINOPHEN IV (For   





    ) 1,000 mg In Empty Bag 1   





    bag @ 400 mls/hr IVPB   





    Q6H TERRA Rx#:[id number]   


 


    Clevidipine Butyrate 25 43.867 53.499 





    mg In Empty Bag 1 bag @ 1   





    MG/HR 2 mls/hr IV .Q24H   





    TERRA Rx#:[id number]   


 


    Insulin Regular 100 unit 4.705 6.792 2.727





    In Sodium Chloride 0.9%   





    100 ml @ Per Protocol IV   





    .Q0M TERRA Rx#:[id number]   


 


    Lactated Ringers 1,000 ml 250  





    @ 20 mls/hr IV .Q24H TERRA   





    Rx#:[id number]   


 


    ceFAZolin 2 gm In Sodium 50  





    Chloride 0.9% 50 ml @ 100   





    mls/hr IVPB Q8HR TERRA Rx#   





    :[id number]   


 


    propofoL 1,000 mg In 2.685  





    Empty Bag 1 bag @ Titrate   





    IV .Q0M TERRA Rx#:   





    [id number]   


 


  Oral 120 650 


 


Output:   


 


  Chest Tube Drainage 190 120 40


 


    mediastinal and right 190 120 40





    pleural   


 


  Urine 1025 350 25


 


Other:   


 


  Voiding Method Indwelling Catheter Indwelling Catheter Indwelling Catheter


 


  Weight  96.4 kg 








                         ABP, PAP, CO, CI - Last 8 Hours











Arterial Blood Pressure        144/60


 


Arterial Blood Pressure        122/49


 


Arterial Blood Pressure        100/51


 


Arterial Blood Pressure        121/54


 


Arterial Blood Pressure        136/54


 


Arterial Blood Pressure        127/51


 


Arterial Blood Pressure        117/48


 


Arterial Blood Pressure        123/50


 


Pulmonary Artery Pressure      28/11


 


Pulmonary Artery Pressure      26/8


 


Pulmonary Artery Pressure      31/14


 


Pulmonary Artery Pressure      31/17


 


Pulmonary Artery Pressure      34/13


 


Pulmonary Artery Pressure      34/13


 


Pulmonary Artery Pressure      31/12


 


Pulmonary Artery Pressure      31/11


 


Cardiac Output                 5.3


 


Cardiac Index                  2.7

















Head exam;  There was no scleral icterus or corneal arcus. Mucous membranes were

moist.


Neck was supple and without jugular venous distension, thyromegaly, or carotid 

bruits. Carotids were easily palpable bilaterally. 


Lungs were clear to auscultation and percussion, and with normal diaphragmatic 

excursion. No wheezes or rales were noted.  The patient has a right pleural and 

mediastinal chest tube both her mother place.


Cardiac exam; The rhythm is irregular consistent of atrial fibrillation


Abdominal exam revealed normal bowel sounds. The abdomen was soft, non-tender, 

and without masses, organomegaly, or appreciable enlargement of the abdominal 

aorta.


Extremities;  palpable pulses. There was no cyanosis, clubbing or edema.


Skin; no evidence of significant rashes, suspicious appearing nevi or other 

concerning lesions.


Neurologic  awake and alert and there is no focal neurological deficits.





Results


CBC & Chem 7: 


                                 05/28/22 04:15





                                 05/28/22 04:15


Labs: 


                  Abnormal Lab Results - Last 24 Hours (Table)











  05/24/22 05/27/22 05/27/22 Range/Units





  09:09 08:50 09:40 


 


WBC     (3.8-10.6)  k/uL


 


RBC     (3.80-5.40)  m/uL


 


Hgb     (11.4-16.0)  gm/dL


 


Hct     (34.0-46.0)  %


 


Neutrophils #     (1.3-7.7)  k/uL


 


Lymphocytes #     (1.0-4.8)  k/uL


 


ABG pH     (7.35-7.45)  


 


ABG pCO2     (35-45)  mmHg


 


ABG pO2   243 H  246 H  ()  mmHg


 


ABG Total CO2     (19-24)  mmol/L


 


ABG O2 Saturation   99.6 H  99.6 H  (94-97)  %


 


ABG Hematocrit     (34.0-46.0)  %


 


ABG Potassium     (3.4-4.5)  mmol/L


 


ABG Ionized Calcium     (4.5-5.3)  mg/dL


 


ABG Glucose    104 H  (75-99)  mg/dL


 


Hemoglobin    10.9 L  (11.4-16.0)  gm/dL


 


Sodium     (137-145)  mmol/L


 


Chloride     ()  mmol/L


 


Glucose     (74-99)  mg/dL


 


POC Glucose (mg/dL)     (75-99)  mg/dL


 


Calcium     (8.4-10.2)  mg/dL


 


Magnesium     (1.6-2.3)  mg/dL


 


AST     (14-36)  U/L


 


Total Protein     (6.3-8.2)  g/dL


 


Albumin     (3.5-5.0)  g/dL


 


TSH     (0.465-4.680)  mIU/L


 


Arterial Blood Potassium     (3.4-4.5)  mmol/L


 


Arterial Blood Glucose    104 H  (75-99)  mg/dL


 


Crossmatch  See Detail    














  05/27/22 05/27/22 05/27/22 Range/Units





  10:06 10:30 11:27 


 


WBC     (3.8-10.6)  k/uL


 


RBC     (3.80-5.40)  m/uL


 


Hgb     (11.4-16.0)  gm/dL


 


Hct     (34.0-46.0)  %


 


Neutrophils #     (1.3-7.7)  k/uL


 


Lymphocytes #     (1.0-4.8)  k/uL


 


ABG pH   7.31 L   (7.35-7.45)  


 


ABG pCO2     (35-45)  mmHg


 


ABG pO2  410 H  349 H  157 H  ()  mmHg


 


ABG Total CO2     (19-24)  mmol/L


 


ABG O2 Saturation  100.0 H  99.8 H  99.2 H  (94-97)  %


 


ABG Hematocrit  25 L  26 L  31 L  (34.0-46.0)  %


 


ABG Potassium  5.4 H  5.2 H  4.9 H  (3.4-4.5)  mmol/L


 


ABG Ionized Calcium  3.8 L  4.1 L   (4.5-5.3)  mg/dL


 


ABG Glucose  104 H  103 H  121 H  (75-99)  mg/dL


 


Hemoglobin  8.2 L  8.3 L  9.9 L  (11.4-16.0)  gm/dL


 


Sodium     (137-145)  mmol/L


 


Chloride     ()  mmol/L


 


Glucose     (74-99)  mg/dL


 


POC Glucose (mg/dL)     (75-99)  mg/dL


 


Calcium     (8.4-10.2)  mg/dL


 


Magnesium     (1.6-2.3)  mg/dL


 


AST     (14-36)  U/L


 


Total Protein     (6.3-8.2)  g/dL


 


Albumin     (3.5-5.0)  g/dL


 


TSH     (0.465-4.680)  mIU/L


 


Arterial Blood Potassium  5.4 H  5.2 H  4.9 H  (3.4-4.5)  mmol/L


 


Arterial Blood Glucose  104 H  103 H  121 H  (75-99)  mg/dL


 


Crossmatch     














  05/27/22 05/27/22 05/27/22 Range/Units





  12:15 12:15 12:18 


 


WBC  18.8 H    (3.8-10.6)  k/uL


 


RBC  3.79 L    (3.80-5.40)  m/uL


 


Hgb  10.7 L    (11.4-16.0)  gm/dL


 


Hct  33.9 L    (34.0-46.0)  %


 


Neutrophils #  15.9 H    (1.3-7.7)  k/uL


 


Lymphocytes #     (1.0-4.8)  k/uL


 


ABG pH     (7.35-7.45)  


 


ABG pCO2     (35-45)  mmHg


 


ABG pO2     ()  mmHg


 


ABG Total CO2     (19-24)  mmol/L


 


ABG O2 Saturation     (94-97)  %


 


ABG Hematocrit     (34.0-46.0)  %


 


ABG Potassium     (3.4-4.5)  mmol/L


 


ABG Ionized Calcium     (4.5-5.3)  mg/dL


 


ABG Glucose     (75-99)  mg/dL


 


Hemoglobin     (11.4-16.0)  gm/dL


 


Sodium     (137-145)  mmol/L


 


Chloride   109 H   ()  mmol/L


 


Glucose   112 H   (74-99)  mg/dL


 


POC Glucose (mg/dL)    117 H  (75-99)  mg/dL


 


Calcium   8.2 L   (8.4-10.2)  mg/dL


 


Magnesium   2.8 H   (1.6-2.3)  mg/dL


 


AST   43 H   (14-36)  U/L


 


Total Protein   5.0 L   (6.3-8.2)  g/dL


 


Albumin   2.9 L   (3.5-5.0)  g/dL


 


TSH     (0.465-4.680)  mIU/L


 


Arterial Blood Potassium     (3.4-4.5)  mmol/L


 


Arterial Blood Glucose     (75-99)  mg/dL


 


Crossmatch     














  05/27/22 05/27/22 05/27/22 Range/Units





  12:46 13:15 14:07 


 


WBC    17.6 H  (3.8-10.6)  k/uL


 


RBC     (3.80-5.40)  m/uL


 


Hgb    11.3 L  (11.4-16.0)  gm/dL


 


Hct     (34.0-46.0)  %


 


Neutrophils #    14.9 H  (1.3-7.7)  k/uL


 


Lymphocytes #     (1.0-4.8)  k/uL


 


ABG pH  7.32 L    (7.35-7.45)  


 


ABG pCO2  47 H    (35-45)  mmHg


 


ABG pO2  207 H    ()  mmHg


 


ABG Total CO2  26 H    (19-24)  mmol/L


 


ABG O2 Saturation  99.0 H    (94-97)  %


 


ABG Hematocrit     (34.0-46.0)  %


 


ABG Potassium     (3.4-4.5)  mmol/L


 


ABG Ionized Calcium     (4.5-5.3)  mg/dL


 


ABG Glucose     (75-99)  mg/dL


 


Hemoglobin     (11.4-16.0)  gm/dL


 


Sodium     (137-145)  mmol/L


 


Chloride     ()  mmol/L


 


Glucose     (74-99)  mg/dL


 


POC Glucose (mg/dL)   113 H   (75-99)  mg/dL


 


Calcium     (8.4-10.2)  mg/dL


 


Magnesium     (1.6-2.3)  mg/dL


 


AST     (14-36)  U/L


 


Total Protein     (6.3-8.2)  g/dL


 


Albumin     (3.5-5.0)  g/dL


 


TSH     (0.465-4.680)  mIU/L


 


Arterial Blood Potassium     (3.4-4.5)  mmol/L


 


Arterial Blood Glucose     (75-99)  mg/dL


 


Crossmatch     














  05/27/22 05/27/22 05/27/22 Range/Units





  14:09 15:12 15:51 


 


WBC     (3.8-10.6)  k/uL


 


RBC     (3.80-5.40)  m/uL


 


Hgb     (11.4-16.0)  gm/dL


 


Hct     (34.0-46.0)  %


 


Neutrophils #     (1.3-7.7)  k/uL


 


Lymphocytes #     (1.0-4.8)  k/uL


 


ABG pH    7.33 L  (7.35-7.45)  


 


ABG pCO2    47 H  (35-45)  mmHg


 


ABG pO2    141 H  ()  mmHg


 


ABG Total CO2    26 H  (19-24)  mmol/L


 


ABG O2 Saturation    98.3 H  (94-97)  %


 


ABG Hematocrit     (34.0-46.0)  %


 


ABG Potassium     (3.4-4.5)  mmol/L


 


ABG Ionized Calcium     (4.5-5.3)  mg/dL


 


ABG Glucose     (75-99)  mg/dL


 


Hemoglobin     (11.4-16.0)  gm/dL


 


Sodium     (137-145)  mmol/L


 


Chloride     ()  mmol/L


 


Glucose     (74-99)  mg/dL


 


POC Glucose (mg/dL)  128 H  130 H   (75-99)  mg/dL


 


Calcium     (8.4-10.2)  mg/dL


 


Magnesium     (1.6-2.3)  mg/dL


 


AST     (14-36)  U/L


 


Total Protein     (6.3-8.2)  g/dL


 


Albumin     (3.5-5.0)  g/dL


 


TSH     (0.465-4.680)  mIU/L


 


Arterial Blood Potassium     (3.4-4.5)  mmol/L


 


Arterial Blood Glucose     (75-99)  mg/dL


 


Crossmatch     














  05/27/22 05/27/22 05/27/22 Range/Units





  16:05 18:02 18:06 


 


WBC    15.9 H  (3.8-10.6)  k/uL


 


RBC     (3.80-5.40)  m/uL


 


Hgb    11.2 L  (11.4-16.0)  gm/dL


 


Hct     (34.0-46.0)  %


 


Neutrophils #    14.5 H  (1.3-7.7)  k/uL


 


Lymphocytes #    0.7 L  (1.0-4.8)  k/uL


 


ABG pH     (7.35-7.45)  


 


ABG pCO2     (35-45)  mmHg


 


ABG pO2     ()  mmHg


 


ABG Total CO2     (19-24)  mmol/L


 


ABG O2 Saturation     (94-97)  %


 


ABG Hematocrit     (34.0-46.0)  %


 


ABG Potassium     (3.4-4.5)  mmol/L


 


ABG Ionized Calcium     (4.5-5.3)  mg/dL


 


ABG Glucose     (75-99)  mg/dL


 


Hemoglobin     (11.4-16.0)  gm/dL


 


Sodium     (137-145)  mmol/L


 


Chloride     ()  mmol/L


 


Glucose     (74-99)  mg/dL


 


POC Glucose (mg/dL)  140 H  144 H   (75-99)  mg/dL


 


Calcium     (8.4-10.2)  mg/dL


 


Magnesium     (1.6-2.3)  mg/dL


 


AST     (14-36)  U/L


 


Total Protein     (6.3-8.2)  g/dL


 


Albumin     (3.5-5.0)  g/dL


 


TSH     (0.465-4.680)  mIU/L


 


Arterial Blood Potassium     (3.4-4.5)  mmol/L


 


Arterial Blood Glucose     (75-99)  mg/dL


 


Crossmatch     














  05/27/22 05/27/22 05/27/22 Range/Units





  19:03 19:50 21:12 


 


WBC     (3.8-10.6)  k/uL


 


RBC     (3.80-5.40)  m/uL


 


Hgb     (11.4-16.0)  gm/dL


 


Hct     (34.0-46.0)  %


 


Neutrophils #     (1.3-7.7)  k/uL


 


Lymphocytes #     (1.0-4.8)  k/uL


 


ABG pH     (7.35-7.45)  


 


ABG pCO2     (35-45)  mmHg


 


ABG pO2     ()  mmHg


 


ABG Total CO2     (19-24)  mmol/L


 


ABG O2 Saturation     (94-97)  %


 


ABG Hematocrit     (34.0-46.0)  %


 


ABG Potassium     (3.4-4.5)  mmol/L


 


ABG Ionized Calcium     (4.5-5.3)  mg/dL


 


ABG Glucose     (75-99)  mg/dL


 


Hemoglobin     (11.4-16.0)  gm/dL


 


Sodium     (137-145)  mmol/L


 


Chloride     ()  mmol/L


 


Glucose     (74-99)  mg/dL


 


POC Glucose (mg/dL)  126 H  130 H  129 H  (75-99)  mg/dL


 


Calcium     (8.4-10.2)  mg/dL


 


Magnesium     (1.6-2.3)  mg/dL


 


AST     (14-36)  U/L


 


Total Protein     (6.3-8.2)  g/dL


 


Albumin     (3.5-5.0)  g/dL


 


TSH     (0.465-4.680)  mIU/L


 


Arterial Blood Potassium     (3.4-4.5)  mmol/L


 


Arterial Blood Glucose     (75-99)  mg/dL


 


Crossmatch     














  05/27/22 05/27/22 05/28/22 Range/Units





  22:12 23:02 00:14 


 


WBC     (3.8-10.6)  k/uL


 


RBC     (3.80-5.40)  m/uL


 


Hgb     (11.4-16.0)  gm/dL


 


Hct     (34.0-46.0)  %


 


Neutrophils #     (1.3-7.7)  k/uL


 


Lymphocytes #     (1.0-4.8)  k/uL


 


ABG pH     (7.35-7.45)  


 


ABG pCO2     (35-45)  mmHg


 


ABG pO2     ()  mmHg


 


ABG Total CO2     (19-24)  mmol/L


 


ABG O2 Saturation     (94-97)  %


 


ABG Hematocrit     (34.0-46.0)  %


 


ABG Potassium     (3.4-4.5)  mmol/L


 


ABG Ionized Calcium     (4.5-5.3)  mg/dL


 


ABG Glucose     (75-99)  mg/dL


 


Hemoglobin     (11.4-16.0)  gm/dL


 


Sodium     (137-145)  mmol/L


 


Chloride     ()  mmol/L


 


Glucose     (74-99)  mg/dL


 


POC Glucose (mg/dL)  125 H  122 H  122 H  (75-99)  mg/dL


 


Calcium     (8.4-10.2)  mg/dL


 


Magnesium     (1.6-2.3)  mg/dL


 


AST     (14-36)  U/L


 


Total Protein     (6.3-8.2)  g/dL


 


Albumin     (3.5-5.0)  g/dL


 


TSH     (0.465-4.680)  mIU/L


 


Arterial Blood Potassium     (3.4-4.5)  mmol/L


 


Arterial Blood Glucose     (75-99)  mg/dL


 


Crossmatch     














  05/28/22 05/28/22 05/28/22 Range/Units





  01:05 02:05 03:02 


 


WBC     (3.8-10.6)  k/uL


 


RBC     (3.80-5.40)  m/uL


 


Hgb     (11.4-16.0)  gm/dL


 


Hct     (34.0-46.0)  %


 


Neutrophils #     (1.3-7.7)  k/uL


 


Lymphocytes #     (1.0-4.8)  k/uL


 


ABG pH     (7.35-7.45)  


 


ABG pCO2     (35-45)  mmHg


 


ABG pO2     ()  mmHg


 


ABG Total CO2     (19-24)  mmol/L


 


ABG O2 Saturation     (94-97)  %


 


ABG Hematocrit     (34.0-46.0)  %


 


ABG Potassium     (3.4-4.5)  mmol/L


 


ABG Ionized Calcium     (4.5-5.3)  mg/dL


 


ABG Glucose     (75-99)  mg/dL


 


Hemoglobin     (11.4-16.0)  gm/dL


 


Sodium     (137-145)  mmol/L


 


Chloride     ()  mmol/L


 


Glucose     (74-99)  mg/dL


 


POC Glucose (mg/dL)  118 H  122 H  122 H  (75-99)  mg/dL


 


Calcium     (8.4-10.2)  mg/dL


 


Magnesium     (1.6-2.3)  mg/dL


 


AST     (14-36)  U/L


 


Total Protein     (6.3-8.2)  g/dL


 


Albumin     (3.5-5.0)  g/dL


 


TSH     (0.465-4.680)  mIU/L


 


Arterial Blood Potassium     (3.4-4.5)  mmol/L


 


Arterial Blood Glucose     (75-99)  mg/dL


 


Crossmatch     














  05/28/22 05/28/22 05/28/22 Range/Units





  04:00 04:15 04:15 


 


WBC    12.3 H  (3.8-10.6)  k/uL


 


RBC    3.45 L  (3.80-5.40)  m/uL


 


Hgb    10.0 L  (11.4-16.0)  gm/dL


 


Hct    30.8 L  (34.0-46.0)  %


 


Neutrophils #    10.1 H  (1.3-7.7)  k/uL


 


Lymphocytes #     (1.0-4.8)  k/uL


 


ABG pH     (7.35-7.45)  


 


ABG pCO2     (35-45)  mmHg


 


ABG pO2     ()  mmHg


 


ABG Total CO2     (19-24)  mmol/L


 


ABG O2 Saturation     (94-97)  %


 


ABG Hematocrit     (34.0-46.0)  %


 


ABG Potassium     (3.4-4.5)  mmol/L


 


ABG Ionized Calcium     (4.5-5.3)  mg/dL


 


ABG Glucose     (75-99)  mg/dL


 


Hemoglobin     (11.4-16.0)  gm/dL


 


Sodium   133 L   (137-145)  mmol/L


 


Chloride     ()  mmol/L


 


Glucose   107 H   (74-99)  mg/dL


 


POC Glucose (mg/dL)  131 H    (75-99)  mg/dL


 


Calcium   7.6 L   (8.4-10.2)  mg/dL


 


Magnesium     (1.6-2.3)  mg/dL


 


AST   57 H   (14-36)  U/L


 


Total Protein   5.1 L   (6.3-8.2)  g/dL


 


Albumin   2.9 L   (3.5-5.0)  g/dL


 


TSH   0.062 L   (0.465-4.680)  mIU/L


 


Arterial Blood Potassium     (3.4-4.5)  mmol/L


 


Arterial Blood Glucose     (75-99)  mg/dL


 


Crossmatch     














  05/28/22 05/28/22 05/28/22 Range/Units





  05:17 06:02 06:55 


 


WBC     (3.8-10.6)  k/uL


 


RBC     (3.80-5.40)  m/uL


 


Hgb     (11.4-16.0)  gm/dL


 


Hct     (34.0-46.0)  %


 


Neutrophils #     (1.3-7.7)  k/uL


 


Lymphocytes #     (1.0-4.8)  k/uL


 


ABG pH     (7.35-7.45)  


 


ABG pCO2     (35-45)  mmHg


 


ABG pO2     ()  mmHg


 


ABG Total CO2     (19-24)  mmol/L


 


ABG O2 Saturation     (94-97)  %


 


ABG Hematocrit     (34.0-46.0)  %


 


ABG Potassium     (3.4-4.5)  mmol/L


 


ABG Ionized Calcium     (4.5-5.3)  mg/dL


 


ABG Glucose     (75-99)  mg/dL


 


Hemoglobin     (11.4-16.0)  gm/dL


 


Sodium     (137-145)  mmol/L


 


Chloride     ()  mmol/L


 


Glucose     (74-99)  mg/dL


 


POC Glucose (mg/dL)  111 H  117 H  170 H  (75-99)  mg/dL


 


Calcium     (8.4-10.2)  mg/dL


 


Magnesium     (1.6-2.3)  mg/dL


 


AST     (14-36)  U/L


 


Total Protein     (6.3-8.2)  g/dL


 


Albumin     (3.5-5.0)  g/dL


 


TSH     (0.465-4.680)  mIU/L


 


Arterial Blood Potassium     (3.4-4.5)  mmol/L


 


Arterial Blood Glucose     (75-99)  mg/dL


 


Crossmatch     














  05/28/22 Range/Units





  08:15 


 


WBC   (3.8-10.6)  k/uL


 


RBC   (3.80-5.40)  m/uL


 


Hgb   (11.4-16.0)  gm/dL


 


Hct   (34.0-46.0)  %


 


Neutrophils #   (1.3-7.7)  k/uL


 


Lymphocytes #   (1.0-4.8)  k/uL


 


ABG pH   (7.35-7.45)  


 


ABG pCO2   (35-45)  mmHg


 


ABG pO2   ()  mmHg


 


ABG Total CO2   (19-24)  mmol/L


 


ABG O2 Saturation   (94-97)  %


 


ABG Hematocrit   (34.0-46.0)  %


 


ABG Potassium   (3.4-4.5)  mmol/L


 


ABG Ionized Calcium   (4.5-5.3)  mg/dL


 


ABG Glucose   (75-99)  mg/dL


 


Hemoglobin   (11.4-16.0)  gm/dL


 


Sodium   (137-145)  mmol/L


 


Chloride   ()  mmol/L


 


Glucose   (74-99)  mg/dL


 


POC Glucose (mg/dL)  119 H  (75-99)  mg/dL


 


Calcium   (8.4-10.2)  mg/dL


 


Magnesium   (1.6-2.3)  mg/dL


 


AST   (14-36)  U/L


 


Total Protein   (6.3-8.2)  g/dL


 


Albumin   (3.5-5.0)  g/dL


 


TSH   (0.465-4.680)  mIU/L


 


Arterial Blood Potassium   (3.4-4.5)  mmol/L


 


Arterial Blood Glucose   (75-99)  mg/dL


 


Crossmatch   














Assessment and Plan


Assessment: 





1.  Mitral valve annuloplasty for severe symptomatic mitral valve regurgitation;

POD 1


- Patient is post thoracotomy, extubated and currently has right lower lobe and 

mediastinal chest tubes in place


- Cardiothoracic surgery/intensive care team on board





2.  New onset atrial fibrillation; expected; patient is rate controlled on 

amiodarone; no anticoagulation therapy recommended





3.  Hypertension; patient has been placed on metoprolol 25 mg twice a day; 

hydrochlorothiazide remains on hold





4.  Hypothyroidism; TSH at 0.062; patient takes levothyroxine 150 MCG at home; 

we will continue current dose and order free T4





5.  Hyperlipidemia; Lipitor 40 mg by mouth daily





DVT prophylaxis; SCDs; plan for anticoagulation atrial fibrillation becomes 

persistent


CODE STATUS; full code

## 2022-05-29 LAB
ALBUMIN SERPL-MCNC: 3 G/DL (ref 3.5–5)
ALP SERPL-CCNC: 49 U/L (ref 38–126)
ALT SERPL-CCNC: 40 U/L (ref 4–34)
ANION GAP SERPL CALC-SCNC: 6 MMOL/L
ANION GAP SERPL CALC-SCNC: 7 MMOL/L
AST SERPL-CCNC: 89 U/L (ref 14–36)
BASOPHILS # BLD AUTO: 0 K/UL (ref 0–0.2)
BASOPHILS NFR BLD AUTO: 0 %
BUN SERPL-SCNC: 20 MG/DL (ref 7–17)
BUN SERPL-SCNC: 23 MG/DL (ref 7–17)
CALCIUM SPEC-MCNC: 7.7 MG/DL (ref 8.4–10.2)
CALCIUM SPEC-MCNC: 7.7 MG/DL (ref 8.4–10.2)
CHLORIDE SERPL-SCNC: 96 MMOL/L (ref 98–107)
CHLORIDE SERPL-SCNC: 98 MMOL/L (ref 98–107)
CO2 SERPL-SCNC: 21 MMOL/L (ref 22–30)
CO2 SERPL-SCNC: 24 MMOL/L (ref 22–30)
EOSINOPHIL # BLD AUTO: 0.1 K/UL (ref 0–0.7)
EOSINOPHIL NFR BLD AUTO: 1 %
ERYTHROCYTE [DISTWIDTH] IN BLOOD BY AUTOMATED COUNT: 3.28 M/UL (ref 3.8–5.4)
ERYTHROCYTE [DISTWIDTH] IN BLOOD: 13.4 % (ref 11.5–15.5)
GLUCOSE BLD-MCNC: 106 MG/DL (ref 75–99)
GLUCOSE BLD-MCNC: 111 MG/DL (ref 75–99)
GLUCOSE BLD-MCNC: 117 MG/DL (ref 75–99)
GLUCOSE BLD-MCNC: 118 MG/DL (ref 75–99)
GLUCOSE BLD-MCNC: 120 MG/DL (ref 75–99)
GLUCOSE BLD-MCNC: 120 MG/DL (ref 75–99)
GLUCOSE BLD-MCNC: 131 MG/DL (ref 75–99)
GLUCOSE BLD-MCNC: 136 MG/DL (ref 75–99)
GLUCOSE SERPL-MCNC: 105 MG/DL (ref 74–99)
GLUCOSE SERPL-MCNC: 119 MG/DL (ref 74–99)
HCT VFR BLD AUTO: 29.5 % (ref 34–46)
HGB BLD-MCNC: 9.3 GM/DL (ref 11.4–16)
LYMPHOCYTES # SPEC AUTO: 2.1 K/UL (ref 1–4.8)
LYMPHOCYTES NFR SPEC AUTO: 13 %
MCH RBC QN AUTO: 28.3 PG (ref 25–35)
MCHC RBC AUTO-ENTMCNC: 31.5 G/DL (ref 31–37)
MCV RBC AUTO: 89.8 FL (ref 80–100)
MONOCYTES # BLD AUTO: 0.9 K/UL (ref 0–1)
MONOCYTES NFR BLD AUTO: 6 %
NEUTROPHILS # BLD AUTO: 12.4 K/UL (ref 1.3–7.7)
NEUTROPHILS NFR BLD AUTO: 78 %
PLATELET # BLD AUTO: 212 K/UL (ref 150–450)
POTASSIUM SERPL-SCNC: 4.1 MMOL/L (ref 3.5–5.1)
POTASSIUM SERPL-SCNC: 4.3 MMOL/L (ref 3.5–5.1)
PROT SERPL-MCNC: 5.2 G/DL (ref 6.3–8.2)
SODIUM SERPL-SCNC: 125 MMOL/L (ref 137–145)
SODIUM SERPL-SCNC: 127 MMOL/L (ref 137–145)
WBC # BLD AUTO: 15.9 K/UL (ref 3.8–10.6)

## 2022-05-29 RX ADMIN — INSULIN ASPART SCH: 100 INJECTION, SOLUTION INTRAVENOUS; SUBCUTANEOUS at 16:19

## 2022-05-29 RX ADMIN — KETOROLAC TROMETHAMINE SCH MG: 15 INJECTION, SOLUTION INTRAMUSCULAR; INTRAVENOUS at 06:09

## 2022-05-29 RX ADMIN — KETOROLAC TROMETHAMINE SCH MG: 15 INJECTION, SOLUTION INTRAMUSCULAR; INTRAVENOUS at 12:04

## 2022-05-29 RX ADMIN — METOPROLOL TARTRATE SCH MG: 25 TABLET, FILM COATED ORAL at 08:04

## 2022-05-29 RX ADMIN — KETOROLAC TROMETHAMINE SCH MG: 15 INJECTION, SOLUTION INTRAMUSCULAR; INTRAVENOUS at 18:15

## 2022-05-29 RX ADMIN — MUPIROCIN SCH APPLIC: 20 OINTMENT TOPICAL at 08:05

## 2022-05-29 RX ADMIN — IPRATROPIUM BROMIDE AND ALBUTEROL SULFATE SCH: .5; 3 SOLUTION RESPIRATORY (INHALATION) at 15:22

## 2022-05-29 RX ADMIN — HEPARIN SODIUM SCH UNIT: 5000 INJECTION INTRAVENOUS; SUBCUTANEOUS at 00:19

## 2022-05-29 RX ADMIN — SODIUM CHLORIDE, PRESERVATIVE FREE SCH ML: 5 INJECTION INTRAVENOUS at 21:26

## 2022-05-29 RX ADMIN — POTASSIUM CHLORIDE SCH MLS/HR: 14.9 INJECTION, SOLUTION INTRAVENOUS at 23:15

## 2022-05-29 RX ADMIN — IPRATROPIUM BROMIDE AND ALBUTEROL SULFATE SCH: .5; 3 SOLUTION RESPIRATORY (INHALATION) at 19:59

## 2022-05-29 RX ADMIN — HEPARIN SODIUM SCH UNIT: 5000 INJECTION INTRAVENOUS; SUBCUTANEOUS at 16:36

## 2022-05-29 RX ADMIN — PANTOPRAZOLE SODIUM SCH MG: 40 TABLET, DELAYED RELEASE ORAL at 06:09

## 2022-05-29 RX ADMIN — AMIODARONE HYDROCHLORIDE SCH MG: 200 TABLET ORAL at 21:25

## 2022-05-29 RX ADMIN — INSULIN ASPART SCH: 100 INJECTION, SOLUTION INTRAVENOUS; SUBCUTANEOUS at 12:13

## 2022-05-29 RX ADMIN — HEPARIN SODIUM SCH UNIT: 5000 INJECTION INTRAVENOUS; SUBCUTANEOUS at 23:16

## 2022-05-29 RX ADMIN — IPRATROPIUM BROMIDE AND ALBUTEROL SULFATE SCH ML: .5; 3 SOLUTION RESPIRATORY (INHALATION) at 08:21

## 2022-05-29 RX ADMIN — DOCUSATE SODIUM AND SENNOSIDES SCH EACH: 50; 8.6 TABLET ORAL at 21:25

## 2022-05-29 RX ADMIN — IPRATROPIUM BROMIDE AND ALBUTEROL SULFATE SCH: .5; 3 SOLUTION RESPIRATORY (INHALATION) at 12:01

## 2022-05-29 RX ADMIN — AMIODARONE HYDROCHLORIDE SCH MG: 200 TABLET ORAL at 08:04

## 2022-05-29 RX ADMIN — CLOPIDOGREL BISULFATE SCH MG: 75 TABLET ORAL at 08:04

## 2022-05-29 RX ADMIN — ASPIRIN 325 MG ORAL TABLET SCH MG: 325 PILL ORAL at 08:04

## 2022-05-29 RX ADMIN — SODIUM CHLORIDE, PRESERVATIVE FREE SCH ML: 5 INJECTION INTRAVENOUS at 08:05

## 2022-05-29 RX ADMIN — MUPIROCIN SCH APPLIC: 20 OINTMENT TOPICAL at 21:26

## 2022-05-29 RX ADMIN — ATORVASTATIN CALCIUM SCH MG: 40 TABLET, FILM COATED ORAL at 08:04

## 2022-05-29 RX ADMIN — INSULIN ASPART SCH: 100 INJECTION, SOLUTION INTRAVENOUS; SUBCUTANEOUS at 21:26

## 2022-05-29 RX ADMIN — METOPROLOL TARTRATE SCH MG: 25 TABLET, FILM COATED ORAL at 21:25

## 2022-05-29 RX ADMIN — KETOROLAC TROMETHAMINE SCH MG: 15 INJECTION, SOLUTION INTRAMUSCULAR; INTRAVENOUS at 23:16

## 2022-05-29 RX ADMIN — HEPARIN SODIUM SCH UNIT: 5000 INJECTION INTRAVENOUS; SUBCUTANEOUS at 08:05

## 2022-05-29 RX ADMIN — KETOROLAC TROMETHAMINE SCH MG: 15 INJECTION, SOLUTION INTRAMUSCULAR; INTRAVENOUS at 00:18

## 2022-05-29 NOTE — XR
EXAMINATION TYPE: XR chest 1V portable

 

DATE OF EXAM: 5/29/2022

 

COMPARISON: 5/20/2022

 

INDICATION: Postop cardiac surgery

 

TECHNIQUE: Single frontal view of the chest is obtained.

 

FINDINGS:  

The heart size is mildly prominent.  

The pulmonary vasculature is normal.  

Minimal scattered increased lung markings are at the lung bases may be some atelectasis. Right-sided 
chest tube is present directed towards the apex. Mediastinal tube is in the midline. Hague-Lam cathet
er is been removed. Sheath remains present.  

 

 

IMPRESSION:  

1. Mild subsegmental atelectasis.

2. Lines and catheters discussed above

## 2022-05-29 NOTE — P.PN
Subjective


Progress Note Date: 22


Principal diagnosis: 





Severe mitral regurgitation with dilated mitral annulus.  Previous medical 

history of hypertension, hypothyroid, remote history of pneumonia, occasional 

EtOH use, never smoker, family history of premature coronary artery disease-

father with first MI at 42 years old,  from MI at 53 years old.  Vaccinated 

against Covid.  Preoperative nasal swab positive for MSSA





POD #2 mitral valve repair with a #28 mm [employer] AnnuloFlex band, clip 

ligation of the left atrial appendage with a 35 mm AtriClip and intraoperative 

transesophageal echocardiogram





Postoperative acute blood loss anemia, expected given hemodilution and 

cardiopulmonary bypass pump





Paroxysmal atrial fibrillation, known common occurrence after open heart surgery





The patient was seen and examined sitting up in a recliner this morning in the 

intensive care unit in no acute distress.  She is currently in sinus rhythm 

although she has been in and out of atrial fibrillation, denies shortness of 

breath.  She does complain of postsurgical chest pain but states it is 

controlled on current medication regimen.  Hemodynamically stable currently.  

Right internal jugular Huntington/Cordis, right radial arterial line, 

mediastinal/right pleural chest tubes all remain.





Objective





- Vital Signs


Vital signs: 


                                   Vital Signs











Temp  98.4 F   22 08:00


 


Pulse  58 L  22 08:00


 


Resp  26 H  22 08:00


 


BP  135/53   22 05:00


 


Pulse Ox  96   22 08:00


 


FiO2  50   22 15:24








                                 Intake & Output











 22





 18:59 06:59 18:59


 


Intake Total 896.252 798.018 26


 


Output Total 470 610 30


 


Balance 426.252 188.018 -4


 


Weight  98.5 kg 


 


Intake:   


 


   312 26


 


    Lactated Ringers 1,000 ml 390 240 20





    @ 20 mls/hr IV .Q24H TERRA   





    Rx#:[id number]   


 


    cardiac output 20  


 


    pressure bags 81 72 6


 


  Intake, IV Titration 5.252 6.018 





  Amount   


 


    Insulin Regular 100 unit 5.252 6.018 





    In Sodium Chloride 0.9%   





    100 ml @ Per Protocol IV   





    .Q0M TERRA Rx#:[id number]   


 


  Oral 400 480 


 


Output:   


 


  Chest Tube Drainage 220 190 


 


    mediastinal and right 220 190 





    pleural   


 


  Urine 250 420 30


 


Other:   


 


  Voiding Method Indwelling Catheter Indwelling Catheter 








                       ABP, PAP, CO, CI - Last Documented











Arterial Blood Pressure        118/46


 


Pulmonary Artery Pressure      28/11


 


Cardiac Output                 4.2


 


Cardiac Index                  2.1

















- Exam





CONSTITUTIONAL: Appears comfortable, cooperative, no acute distress


RESPIRATORY:  Lungs sounds diminished bilaterally.  Respirations even, 

nonlabored.  Currently on 3 L nasal cannula with oxygen saturation 96%.  Able to

achieve 1000 mL on incentive spirometry.  Strong cough.  


CARDIOVASCULAR:  S1, S2 present.  Regular rate and rhythm, sinus rhythm on tele

metry currently, has been going in and out of A. fib overnight.  Sternum stable.

  Palpable peripheral pulses bilaterally.  Trace bilateral lower extremity edema

present.  No calf pain or tenderness noted.  Heart hugger in place with patient 

demonstrating appropriate use.  Antiembolism stockings, SCDs present.


GASTROINTESTINAL:  Abdomen soft, nontender, nondistended.  Hypoactive bowel 

sounds present 4 quadrants.  Tolerating clear liquid.  Negative flatus, 

positive belching


GENITOURINARY:  Bryson present draining clear, yellow urine.  Output overnight 

30-35 mL per hour, 670 mL last 24 hours


INTEGUMENTARY:  Skin is warm and dry with evidence of good perfusion.  Anterior 

chest incision well approximated and covered with dry intact dressing


NEUROLOGIC:  Cranial nerves II through XII intact


MUSKULOSKELETAL:  Able to move all extremities, strength equal bilaterally


PSYCHIATRIC:  Alert and oriented to person place and time, appropriate affect, 

intact judgment and insight


INVASIVE LINES AND TUBES:  Mediastinal/right pleural chest tubes present and 

connected to wall suction, no air leaks present, 170 mL serosanguineous drainage

overnight, 400 mL since surgery.  A/V epicardial pacemaker wires present, 

connected to generator, AAI mode with backup rate 50 bpm.  Right internal 

jugular Cordis, right radial arterial line present. 





- Allied health notes


Allied health notes reviewed: nursing





- Labs


CBC & Chem 7: 


                                 22 04:30





                                 22 04:30


Labs: 


                  Abnormal Lab Results - Last 24 Hours (Table)











  22 Range/Units





  08:15 09:35 11:08 


 


WBC     (3.8-10.6)  k/uL


 


RBC     (3.80-5.40)  m/uL


 


Hgb     (11.4-16.0)  gm/dL


 


Hct     (34.0-46.0)  %


 


Neutrophils #     (1.3-7.7)  k/uL


 


Sodium     (137-145)  mmol/L


 


Carbon Dioxide     (22-30)  mmol/L


 


BUN     (7-17)  mg/dL


 


Glucose     (74-99)  mg/dL


 


POC Glucose (mg/dL)  119 H  107 H  170 H  (75-99)  mg/dL


 


Calcium     (8.4-10.2)  mg/dL


 


AST     (14-36)  U/L


 


ALT     (4-34)  U/L


 


Total Protein     (6.3-8.2)  g/dL


 


Albumin     (3.5-5.0)  g/dL














  22 Range/Units





  12:22 13:44 14:58 


 


WBC     (3.8-10.6)  k/uL


 


RBC     (3.80-5.40)  m/uL


 


Hgb     (11.4-16.0)  gm/dL


 


Hct     (34.0-46.0)  %


 


Neutrophils #     (1.3-7.7)  k/uL


 


Sodium     (137-145)  mmol/L


 


Carbon Dioxide     (22-30)  mmol/L


 


BUN     (7-17)  mg/dL


 


Glucose     (74-99)  mg/dL


 


POC Glucose (mg/dL)  129 H  123 H  122 H  (75-99)  mg/dL


 


Calcium     (8.4-10.2)  mg/dL


 


AST     (14-36)  U/L


 


ALT     (4-34)  U/L


 


Total Protein     (6.3-8.2)  g/dL


 


Albumin     (3.5-5.0)  g/dL














  22 Range/Units





  16:39 18:08 19:16 


 


WBC     (3.8-10.6)  k/uL


 


RBC     (3.80-5.40)  m/uL


 


Hgb     (11.4-16.0)  gm/dL


 


Hct     (34.0-46.0)  %


 


Neutrophils #     (1.3-7.7)  k/uL


 


Sodium     (137-145)  mmol/L


 


Carbon Dioxide     (22-30)  mmol/L


 


BUN     (7-17)  mg/dL


 


Glucose     (74-99)  mg/dL


 


POC Glucose (mg/dL)  116 H  130 H  131 H  (75-99)  mg/dL


 


Calcium     (8.4-10.2)  mg/dL


 


AST     (14-36)  U/L


 


ALT     (4-34)  U/L


 


Total Protein     (6.3-8.2)  g/dL


 


Albumin     (3.5-5.0)  g/dL














  22 Range/Units





  19:53 22:33 00:24 


 


WBC     (3.8-10.6)  k/uL


 


RBC     (3.80-5.40)  m/uL


 


Hgb     (11.4-16.0)  gm/dL


 


Hct     (34.0-46.0)  %


 


Neutrophils #     (1.3-7.7)  k/uL


 


Sodium     (137-145)  mmol/L


 


Carbon Dioxide     (22-30)  mmol/L


 


BUN     (7-17)  mg/dL


 


Glucose     (74-99)  mg/dL


 


POC Glucose (mg/dL)  127 H  125 H  120 H  (75-99)  mg/dL


 


Calcium     (8.4-10.2)  mg/dL


 


AST     (14-36)  U/L


 


ALT     (4-34)  U/L


 


Total Protein     (6.3-8.2)  g/dL


 


Albumin     (3.5-5.0)  g/dL














  22 Range/Units





  02:27 04:29 04:30 


 


WBC    15.9 H  (3.8-10.6)  k/uL


 


RBC    3.28 L  (3.80-5.40)  m/uL


 


Hgb    9.3 L  (11.4-16.0)  gm/dL


 


Hct    29.5 L  (34.0-46.0)  %


 


Neutrophils #    12.4 H  (1.3-7.7)  k/uL


 


Sodium     (137-145)  mmol/L


 


Carbon Dioxide     (22-30)  mmol/L


 


BUN     (7-17)  mg/dL


 


Glucose     (74-99)  mg/dL


 


POC Glucose (mg/dL)  117 H  106 H   (75-99)  mg/dL


 


Calcium     (8.4-10.2)  mg/dL


 


AST     (14-36)  U/L


 


ALT     (4-34)  U/L


 


Total Protein     (6.3-8.2)  g/dL


 


Albumin     (3.5-5.0)  g/dL














  22 Range/Units





  04:30 06:13 


 


WBC    (3.8-10.6)  k/uL


 


RBC    (3.80-5.40)  m/uL


 


Hgb    (11.4-16.0)  gm/dL


 


Hct    (34.0-46.0)  %


 


Neutrophils #    (1.3-7.7)  k/uL


 


Sodium  125 L   (137-145)  mmol/L


 


Carbon Dioxide  21 L   (22-30)  mmol/L


 


BUN  20 H   (7-17)  mg/dL


 


Glucose  105 H   (74-99)  mg/dL


 


POC Glucose (mg/dL)   136 H  (75-99)  mg/dL


 


Calcium  7.7 L   (8.4-10.2)  mg/dL


 


AST  89 H   (14-36)  U/L


 


ALT  40 H   (4-34)  U/L


 


Total Protein  5.2 L   (6.3-8.2)  g/dL


 


Albumin  3.0 L   (3.5-5.0)  g/dL














- Imaging and Cardiology


Chest x-ray: report reviewed, image reviewed





Assessment and Plan


Assessment: 





1.  Severe mitral regurgitation with dilated mitral annulus, status post mitral 

valve repair


2.  Hypertension


3.  History of hypothyroid, currently hyperthyroid with TSH 0.036 on 2022,

0.062 with FT4 1.49 on 2022, has been off Synthroid per her primary since 




4.  Remote history of pneumonia


5.  Occasional EtOH use, no history of withdrawal


6.  Never smoker, preoperative FEV1 111% of predicted


7.  Family history of premature coronary artery disease-father with first MI at 

42 years old,  from MI at 55 years old


8.  Vaccinated against Covid


9.  Preoperative nasal swab positive for MSSA, treated with mupirocin


10.  Postoperative acute blood loss anemia, expected 


11.  Paroxysmal atrial fibrillation, known common occurrence after open heart 

surgery, status post ligation of the left atrial appendage


Plan: 





1.  Continue aspirin, statin, beta blocker therapy.  Will increase beta blocker 

therapy as tolerated


2.  Continue amiodarone for A. fib prophylaxis.  No anticoagulation at this time

but may need at discharge


3.  Wean O2 as tolerated.  Encourage incentive spirometry is 10 times every hour

while awake.  Bronchodilators per pulmonology


4.  Increase activity, ambulate as tolerated.  PT/OT/cardiac rehab consulted


5.  Will monitor daily labs and x-rays.  Electrolyte replacement per protocol


6.  GI/DVT prophylaxis


7.  Pain control with current medication regimen


8.  Insulin management per primary care service.  Patient is not diabetic, 

hemoglobin A1c 5.7%


9.  Will discontinue mediastinal chest tube, continue right pleural chest tube 

for another 24 hours


10.  Discontinue Bryson, may bladder scan and straight cath for >300 mL residual


11.  Strict accurate intake and output.  Daily weights


12.  Continue to hold Synthroid for now.  Management per primary care


13.  More recommendations to follow as patient progresses

## 2022-05-29 NOTE — P.PN
Subjective


Progress Note Date: 05/29/22





62-year-old female patient, history of hypertension, hypothyroidism and mitral 

valve prolapse, underwent a mitral valve annuloplasty.  The patient was brought 

into the intensive care unit postoperatively and was intubated and sedated. 

Initial blood gases showed a pH of 7.31 with a pCO2 of 47 and pO2 of 207.  The 

patient has chest tubes 1 mediastinal and 1 right pleural.  The postop chest x-

ray showed no acute abnormalities.  No evidence of pneumothorax.  


Patient was extubated yesterday and is currently on nasal cannula 


Earlier this morning, the patient went into atrial fibrillation with rapid vent

ricular response, and expected outcome of surgery, and the patient was started 

on amiodarone drip loading and subsequent maintenance will be following.  The 

patient is also on beta blockers in the form of metoprolol and the dose has been

increased up to 25 mg by mouth twice a day.  No anticoagulants for now.  

Hemodynamically she is stable.  Adequate urine output.  White cell count of 12.3

with hemoglobin of 10 and a platelet count of 241.  BUN is at 40 with a 

creatinine of 0.67 and his sodium level is at 133.  TSH is at 1.49.





05/29/2022


Patient is seen and evaluated sitting up in bedside chair in ICU; postop day #2.

 She is currently on 3 L of oxygen by nasal cannula.  No chest pain.  


The patient has a right pleural and mediastinal chest tube still in place.  


The chest x-ray from today shows elevation of the right hemidiaphragm.  Chest 

tubes are in good location. There may be some right lower lobe atelectasis.  She

is hemodynamically stable.  


She is in a sinus rhythm at this point in time; she went into atrial 

fibrillation yesterday and she converted back into sinus with amiodarone; she is

on oral amiodarone 400 mg by mouth twice a day.


Blood work reveals, sodium level is at 125 and a potassium level is at 4.3, BUN 

is at 20 with a creatinine of 1.0, LFTs show in AST of 89, ALT of 40, alkaline 

phosphatase of 49, the white cell count is at 15.9 with a hemoglobin of 9.3. 


She is on Lipitor 40 mg by mouth daily.  She is also on Plavix 75 mg by mouth d

aily.  She has adequate pain control for now.  She was also started on 

metoprolol 25 mg by mouth twice a day. 





Objective





- Vital Signs


Vital signs: 


                                   Vital Signs











Temp  98.4 F   05/29/22 08:00


 


Pulse  58 L  05/29/22 14:00


 


Resp  25 H  05/29/22 14:00


 


BP  121/57   05/29/22 14:00


 


Pulse Ox  97   05/29/22 14:00


 


FiO2  50   05/27/22 15:24








                                 Intake & Output











 05/28/22 05/29/22 05/29/22





 18:59 06:59 18:59


 


Intake Total 896.252 798.018 482


 


Output Total 470 610 805


 


Balance 426.252 188.018 -323


 


Weight  98.5 kg 


 


Intake:   


 


   312 182


 


    Lactated Ringers 1,000 ml 390 240 140





    @ 20 mls/hr IV .Q24H TERRA   





    Rx#:[id number]   


 


    cardiac output 20  


 


    pressure bags 81 72 42


 


  Intake, IV Titration 5.252 6.018 





  Amount   


 


    Insulin Regular 100 unit 5.252 6.018 





    In Sodium Chloride 0.9%   





    100 ml @ Per Protocol IV   





    .Q0M TERRA Rx#:[id number]   


 


  Oral 400 480 300


 


Output:   


 


  Chest Tube Drainage 220 190 50


 


    mediastinal and right 220 190 50





    pleural   


 


  Urine 250 420 755


 


Other:   


 


  Voiding Method Indwelling Catheter Indwelling Catheter Indwelling Catheter








                       ABP, PAP, CO, CI - Last Documented











Arterial Blood Pressure        141/75


 


Pulmonary Artery Pressure      28/11


 


Cardiac Output                 4.2


 


Cardiac Index                  2.1

















- Exam





Head exam;  There was no scleral icterus or corneal arcus. Mucous membranes were

moist.


Neck was supple and without jugular venous distension, thyromegaly, or carotid 

bruits. Carotids were easily palpable bilaterally. 


Lungs were clear to auscultation and percussion, and with normal diaphragmatic 

excursion. No wheezes or rales were noted.  The patient has a right pleural and 

mediastinal chest tube both her mother place.


Cardiac exam; The rhythm is irregular consistent of atrial fibrillation


Abdominal exam revealed normal bowel sounds. The abdomen was soft, non-tender, 

and without masses, organomegaly, or appreciable enlargement of the abdominal 

aorta.


Extremities;  palpable pulses. There was no cyanosis, clubbing or edema.


Skin; no evidence of significant rashes, suspicious appearing nevi or other 

concerning lesions.


Neurologic  awake and alert and there is no focal neurological deficits.





- Labs


CBC & Chem 7: 


                                 05/29/22 04:30





                                 05/29/22 12:11


Labs: 


                  Abnormal Lab Results - Last 24 Hours (Table)











  05/28/22 05/28/22 05/28/22 Range/Units





  14:58 16:39 18:08 


 


WBC     (3.8-10.6)  k/uL


 


RBC     (3.80-5.40)  m/uL


 


Hgb     (11.4-16.0)  gm/dL


 


Hct     (34.0-46.0)  %


 


Neutrophils #     (1.3-7.7)  k/uL


 


Sodium     (137-145)  mmol/L


 


Chloride     ()  mmol/L


 


Carbon Dioxide     (22-30)  mmol/L


 


BUN     (7-17)  mg/dL


 


Creatinine     (0.52-1.04)  mg/dL


 


Glucose     (74-99)  mg/dL


 


POC Glucose (mg/dL)  122 H  116 H  130 H  (75-99)  mg/dL


 


Calcium     (8.4-10.2)  mg/dL


 


AST     (14-36)  U/L


 


ALT     (4-34)  U/L


 


Total Protein     (6.3-8.2)  g/dL


 


Albumin     (3.5-5.0)  g/dL














  05/28/22 05/28/22 05/28/22 Range/Units





  19:16 19:53 22:33 


 


WBC     (3.8-10.6)  k/uL


 


RBC     (3.80-5.40)  m/uL


 


Hgb     (11.4-16.0)  gm/dL


 


Hct     (34.0-46.0)  %


 


Neutrophils #     (1.3-7.7)  k/uL


 


Sodium     (137-145)  mmol/L


 


Chloride     ()  mmol/L


 


Carbon Dioxide     (22-30)  mmol/L


 


BUN     (7-17)  mg/dL


 


Creatinine     (0.52-1.04)  mg/dL


 


Glucose     (74-99)  mg/dL


 


POC Glucose (mg/dL)  131 H  127 H  125 H  (75-99)  mg/dL


 


Calcium     (8.4-10.2)  mg/dL


 


AST     (14-36)  U/L


 


ALT     (4-34)  U/L


 


Total Protein     (6.3-8.2)  g/dL


 


Albumin     (3.5-5.0)  g/dL














  05/29/22 05/29/22 05/29/22 Range/Units





  00:24 02:27 04:29 


 


WBC     (3.8-10.6)  k/uL


 


RBC     (3.80-5.40)  m/uL


 


Hgb     (11.4-16.0)  gm/dL


 


Hct     (34.0-46.0)  %


 


Neutrophils #     (1.3-7.7)  k/uL


 


Sodium     (137-145)  mmol/L


 


Chloride     ()  mmol/L


 


Carbon Dioxide     (22-30)  mmol/L


 


BUN     (7-17)  mg/dL


 


Creatinine     (0.52-1.04)  mg/dL


 


Glucose     (74-99)  mg/dL


 


POC Glucose (mg/dL)  120 H  117 H  106 H  (75-99)  mg/dL


 


Calcium     (8.4-10.2)  mg/dL


 


AST     (14-36)  U/L


 


ALT     (4-34)  U/L


 


Total Protein     (6.3-8.2)  g/dL


 


Albumin     (3.5-5.0)  g/dL














  05/29/22 05/29/22 05/29/22 Range/Units





  04:30 04:30 06:13 


 


WBC  15.9 H    (3.8-10.6)  k/uL


 


RBC  3.28 L    (3.80-5.40)  m/uL


 


Hgb  9.3 L    (11.4-16.0)  gm/dL


 


Hct  29.5 L    (34.0-46.0)  %


 


Neutrophils #  12.4 H    (1.3-7.7)  k/uL


 


Sodium   125 L   (137-145)  mmol/L


 


Chloride     ()  mmol/L


 


Carbon Dioxide   21 L   (22-30)  mmol/L


 


BUN   20 H   (7-17)  mg/dL


 


Creatinine     (0.52-1.04)  mg/dL


 


Glucose   105 H   (74-99)  mg/dL


 


POC Glucose (mg/dL)    136 H  (75-99)  mg/dL


 


Calcium   7.7 L   (8.4-10.2)  mg/dL


 


AST   89 H   (14-36)  U/L


 


ALT   40 H   (4-34)  U/L


 


Total Protein   5.2 L   (6.3-8.2)  g/dL


 


Albumin   3.0 L   (3.5-5.0)  g/dL














  05/29/22 05/29/22 05/29/22 Range/Units





  08:12 12:08 12:11 


 


WBC     (3.8-10.6)  k/uL


 


RBC     (3.80-5.40)  m/uL


 


Hgb     (11.4-16.0)  gm/dL


 


Hct     (34.0-46.0)  %


 


Neutrophils #     (1.3-7.7)  k/uL


 


Sodium    127 L  (137-145)  mmol/L


 


Chloride    96 L  ()  mmol/L


 


Carbon Dioxide     (22-30)  mmol/L


 


BUN    23 H  (7-17)  mg/dL


 


Creatinine    1.08 H  (0.52-1.04)  mg/dL


 


Glucose    119 H  (74-99)  mg/dL


 


POC Glucose (mg/dL)  131 H  120 H   (75-99)  mg/dL


 


Calcium    7.7 L  (8.4-10.2)  mg/dL


 


AST     (14-36)  U/L


 


ALT     (4-34)  U/L


 


Total Protein     (6.3-8.2)  g/dL


 


Albumin     (3.5-5.0)  g/dL














Assessment and Plan


Assessment: 





1.  Mitral valve annuloplasty for severe symptomatic mitral valve regurgitation;

POD 1


- Patient is post thoracotomy, extubated and currently has right lower lobe and 

mediastinal chest tubes in place


- Cardiothoracic surgery/intensive care team on board





2.  New onset atrial fibrillation; expected; patient is rate controlled on 

amiodarone; no anticoagulation therapy recommended





3.  Hypertension; patient has been placed on metoprolol 25 mg twice a day; 

hydrochlorothiazide remains on hold





4.  Hypothyroidism; TSH at 0.062; patient takes levothyroxine 150 MCG at home; 

we will continue current dose and order free T4





5.  Hyperlipidemia; Lipitor 40 mg by mouth daily





DVT prophylaxis; SCDs; plan for anticoagulation atrial fibrillation becomes 

persistent


CODE STATUS; full code

## 2022-05-29 NOTE — P.PN
Subjective





HISTORY OF PRESENTING ILLNESS


Patient is a pleasant 62 -year-old female with history of hypertension, GERD, 

hypothyroidism, severe mitral regurgitation.  She has been having increased 

shortness breath over the last 3 months and therefore underwent workup and found

have severe mitral regurgitation.  She underwent mitral valve repair 05/27/2022 

with a 28 mm [employer] AnnuloFlex band, left atrial appendage clip without 

incident.  She was extubated and currently this morning doing well and admitting

to some chest pain around the incision worse with deep inspiration however not 

much shortness breath.  She did have a brief episode of atrial fibrillation was 

placed on amiodarone with conversion back to sinus rhythm.  She did admit to 

palpitations during this episode and admits to some palpitations at home or last

few years which felt similar to this.  No history of stroke or TIA.  She was 

noted to be mildly hyperthyroid on blood work.





5/29


Patient seen and examined.  Patient still having intermittent episodes of atrial

fibrillation with predominantly controlled ventricular rates.  Not overly 

symptomatic.  Denies any chest pain other than the incision.  Shortness breath 

relatively mild.  Does have mild appetite however on clear liquids.





PHYSICAL EXAMINATION


Vital signs reviewed.


CONSTITUTIONAL: No apparent distress. 


HEENT: Head is normocephalic. Pupils are equal, round. Sclerae anicteric. Mucous

membranes of the mouth are moist.  No JVD. No carotid bruit.


CHEST EXAMINATION: Lungs are clear to auscultation. No chest wall tenderness is 

noted on palpation or with deep breathing. 


HEART EXAMINATION: Regular rate and rhythm. S1, S2 heard. No murmurs, gallops or

rub.


ABDOMEN: Soft, nontender. Positive bowel sounds.


EXTREMITIES: 2+ peripheral pulses, no lower extremity edema and no calf 

tenderness.


NEUROLOGIC EXAMINATION: Patient is awake, alert and oriented x3. 





Assessment


1.  Severe mitral regurgitation status post mitral valve repair


2.  Paroxysmal atrial fibrillation, likely postoperative however additional 

history of similar palpitations in the past


3.  Hypertension


4.  Hypothyroidism currently hyperthyroid likely iatrogenic


5.  Palpitations





Plan:


Patient still having intermittent episodes of A. fib.  At this point her 

CHADSVASC is 2 for hypertension and female however this may all be postoperative

A. fib and likely exacerbated by the hyperthyroid state.  She did also had left 

atrial appendage closure.  At this time continue with antiplatelets and if has 

recurrent episodes further down the road would recommend anticoagulation at that

time.








Objective





- Vital Signs


Vital signs: 


                                   Vital Signs











Temp  97.9 F   05/29/22 04:00


 


Pulse  61   05/29/22 07:00


 


Resp  16   05/29/22 07:00


 


BP  135/53   05/29/22 05:00


 


Pulse Ox  96   05/29/22 07:00


 


FiO2  50   05/27/22 15:24








                                 Intake & Output











 05/28/22 05/29/22 05/29/22





 18:59 06:59 18:59


 


Intake Total 896.252 798.018 26


 


Output Total 470 610 30


 


Balance 426.252 188.018 -4


 


Weight  98.5 kg 


 


Intake:   


 


   312 26


 


    Lactated Ringers 1,000 ml 390 240 20





    @ 20 mls/hr IV .Q24H TERRA   





    Rx#:[id number]   


 


    cardiac output 20  


 


    pressure bags 81 72 6


 


  Intake, IV Titration 5.252 6.018 





  Amount   


 


    Insulin Regular 100 unit 5.252 6.018 





    In Sodium Chloride 0.9%   





    100 ml @ Per Protocol IV   





    .Q0M TERRA Rx#:[id number]   


 


  Oral 400 480 


 


Output:   


 


  Chest Tube Drainage 220 190 


 


    mediastinal and right 220 190 





    pleural   


 


  Urine 250 420 30


 


Other:   


 


  Voiding Method Indwelling Catheter Indwelling Catheter 








                       ABP, PAP, CO, CI - Last Documented











Arterial Blood Pressure        120/48


 


Pulmonary Artery Pressure      28/11


 


Cardiac Output                 4.2


 


Cardiac Index                  2.1

















- Labs


CBC & Chem 7: 


                                 05/29/22 04:30





                                 05/29/22 04:30


Labs: 


                  Abnormal Lab Results - Last 24 Hours (Table)











  05/28/22 05/28/22 05/28/22 Range/Units





  08:15 09:35 11:08 


 


WBC     (3.8-10.6)  k/uL


 


RBC     (3.80-5.40)  m/uL


 


Hgb     (11.4-16.0)  gm/dL


 


Hct     (34.0-46.0)  %


 


Neutrophils #     (1.3-7.7)  k/uL


 


Sodium     (137-145)  mmol/L


 


Carbon Dioxide     (22-30)  mmol/L


 


BUN     (7-17)  mg/dL


 


Glucose     (74-99)  mg/dL


 


POC Glucose (mg/dL)  119 H  107 H  170 H  (75-99)  mg/dL


 


Calcium     (8.4-10.2)  mg/dL


 


AST     (14-36)  U/L


 


ALT     (4-34)  U/L


 


Total Protein     (6.3-8.2)  g/dL


 


Albumin     (3.5-5.0)  g/dL














  05/28/22 05/28/22 05/28/22 Range/Units





  12:22 13:44 14:58 


 


WBC     (3.8-10.6)  k/uL


 


RBC     (3.80-5.40)  m/uL


 


Hgb     (11.4-16.0)  gm/dL


 


Hct     (34.0-46.0)  %


 


Neutrophils #     (1.3-7.7)  k/uL


 


Sodium     (137-145)  mmol/L


 


Carbon Dioxide     (22-30)  mmol/L


 


BUN     (7-17)  mg/dL


 


Glucose     (74-99)  mg/dL


 


POC Glucose (mg/dL)  129 H  123 H  122 H  (75-99)  mg/dL


 


Calcium     (8.4-10.2)  mg/dL


 


AST     (14-36)  U/L


 


ALT     (4-34)  U/L


 


Total Protein     (6.3-8.2)  g/dL


 


Albumin     (3.5-5.0)  g/dL














  05/28/22 05/28/22 05/28/22 Range/Units





  16:39 18:08 19:16 


 


WBC     (3.8-10.6)  k/uL


 


RBC     (3.80-5.40)  m/uL


 


Hgb     (11.4-16.0)  gm/dL


 


Hct     (34.0-46.0)  %


 


Neutrophils #     (1.3-7.7)  k/uL


 


Sodium     (137-145)  mmol/L


 


Carbon Dioxide     (22-30)  mmol/L


 


BUN     (7-17)  mg/dL


 


Glucose     (74-99)  mg/dL


 


POC Glucose (mg/dL)  116 H  130 H  131 H  (75-99)  mg/dL


 


Calcium     (8.4-10.2)  mg/dL


 


AST     (14-36)  U/L


 


ALT     (4-34)  U/L


 


Total Protein     (6.3-8.2)  g/dL


 


Albumin     (3.5-5.0)  g/dL














  05/28/22 05/28/22 05/29/22 Range/Units





  19:53 22:33 00:24 


 


WBC     (3.8-10.6)  k/uL


 


RBC     (3.80-5.40)  m/uL


 


Hgb     (11.4-16.0)  gm/dL


 


Hct     (34.0-46.0)  %


 


Neutrophils #     (1.3-7.7)  k/uL


 


Sodium     (137-145)  mmol/L


 


Carbon Dioxide     (22-30)  mmol/L


 


BUN     (7-17)  mg/dL


 


Glucose     (74-99)  mg/dL


 


POC Glucose (mg/dL)  127 H  125 H  120 H  (75-99)  mg/dL


 


Calcium     (8.4-10.2)  mg/dL


 


AST     (14-36)  U/L


 


ALT     (4-34)  U/L


 


Total Protein     (6.3-8.2)  g/dL


 


Albumin     (3.5-5.0)  g/dL














  05/29/22 05/29/22 05/29/22 Range/Units





  02:27 04:29 04:30 


 


WBC    15.9 H  (3.8-10.6)  k/uL


 


RBC    3.28 L  (3.80-5.40)  m/uL


 


Hgb    9.3 L  (11.4-16.0)  gm/dL


 


Hct    29.5 L  (34.0-46.0)  %


 


Neutrophils #    12.4 H  (1.3-7.7)  k/uL


 


Sodium     (137-145)  mmol/L


 


Carbon Dioxide     (22-30)  mmol/L


 


BUN     (7-17)  mg/dL


 


Glucose     (74-99)  mg/dL


 


POC Glucose (mg/dL)  117 H  106 H   (75-99)  mg/dL


 


Calcium     (8.4-10.2)  mg/dL


 


AST     (14-36)  U/L


 


ALT     (4-34)  U/L


 


Total Protein     (6.3-8.2)  g/dL


 


Albumin     (3.5-5.0)  g/dL














  05/29/22 05/29/22 Range/Units





  04:30 06:13 


 


WBC    (3.8-10.6)  k/uL


 


RBC    (3.80-5.40)  m/uL


 


Hgb    (11.4-16.0)  gm/dL


 


Hct    (34.0-46.0)  %


 


Neutrophils #    (1.3-7.7)  k/uL


 


Sodium  125 L   (137-145)  mmol/L


 


Carbon Dioxide  21 L   (22-30)  mmol/L


 


BUN  20 H   (7-17)  mg/dL


 


Glucose  105 H   (74-99)  mg/dL


 


POC Glucose (mg/dL)   136 H  (75-99)  mg/dL


 


Calcium  7.7 L   (8.4-10.2)  mg/dL


 


AST  89 H   (14-36)  U/L


 


ALT  40 H   (4-34)  U/L


 


Total Protein  5.2 L   (6.3-8.2)  g/dL


 


Albumin  3.0 L   (3.5-5.0)  g/dL

## 2022-05-29 NOTE — P.PN
Subjective


Progress Note Date: 05/29/22








62-year-old female patient, underwent a mitral valve annuloplasty and she is 

currently postop day #0.  The patient was brought into the intensive care unit. 

Pulmonary critical care services were consulted.  I saw the patient immediately 

after she arrived from the operating room.  She was hemodynamically stable on no

pressors.  The patient was sedated with propofol and the sedation was weaned 

down the propofol was at the rate of 10 g is particularly per minute.  The 

patient was adequately sedated and the patient was, comfortable, sickle cell 

mechanical ventilator.    The patient was an assist-control mode of mechanical 

ventilation at the rate of 12 with a tidal volume of 400 and FiO2 of 100% with a

PEEP of 10.  The FiO2 was up to 60%.  Initial blood gases showed a pH of 7.31 

with a pCO2 of 47 and pO2 of 207.  The patient has chest tubes 1 mediastinal and

1 right pleural.  The postop chest x-ray showed no acute abnormalities.  No 

evidence of pneumothorax.  ET tube was in a good location.  The patient has a 

Philadelphia-Lam catheter in the right IJ which is also in a good location.  The 

patient's cardiac rhythm is sinus.  The patient has no hemodynamic instability. 

Pulmonary artery pressures were 35/19.  Cardiac output was at 4.3 with an index 

of 2.2.  Urine output was adequate.  The blood work showed a white cell, 70.6 

with a hemoglobin of 11.3 and a platelet count is 262.  Output from the chest 

tubes are minimal at this point in time.  No evidence of any bleeding. 





05/28/2022 the patient is postop day #1.  She is doing well.  She extubated 

yesterday without any major difficulties and this morning she is on 3 L of 

oxygen by nasal cannula.  The patient is using incentive spirometer, pulling 

approximately thousand.  No respiratory distress.  Chest x-ray from today 

essentially showing postsurgical changes.  The patient has a right-sided chest 

tube, mediastinal chest tube and the Philadelphia-Lam catheter in place.  ET tube is 

removed.  Some atelectatic changes and left lung base.  No evidence of any 

pneumothorax.  Output from the chest tubes combined has been around 600 mL since

came from the operating room.  Earlier this morning, the patient went into 

atrial fibrillation with rapid ventricular response, and expected outcome of 

surgery, and the patient was started on amiodarone drip loading and subsequent 

maintenance will be following.  The patient is also on beta blockers in the form

of metoprolol and the dose has been increased up to 25 mg by mouth twice a day. 

No anticoagulants for now.  Hemodynamically she is stable.  Adequate urine 

output.  White cell count of 12.3 with hemoglobin of 10 and a platelet count of 

241.  BUN is at 40 with a creatinine of 0.67 and his sodium level is at 133.  

TSH is at 1.49.





05/29/2022, the patient is postop day #2.  She is currently on 3 L of oxygen by 

nasal cannula.  She is using incentive spirometer and she is pulling 

approximately 1000 mL.  No chest pain.  Surgical wound site is dry clean and 

intact.  The patient has a right pleural and mediastinal chest tube still in 

place.  The chest x-ray from today shows elevation of the right hemidiaphragm.  

Chest tubes are in good location.  The patient has some postsurgical changes o

day the sternum was interval wires.  There may be some right lower lobe 

atelectasis.  She is hemodynamically stable.  She is in a sinus rhythm at this 

point in time.  Note that she went into atrial fibrillation yesterday and she 

converted back into sinus with amiodarone.  On today's blood work, sodium level 

is at 125 and a potassium level is at 4.3, BUN is at 20 with a creatinine of 

1.0, LFTs show in AST of 89, ALT of 40, alkaline phosphatase of 49, the white 

cell count is at 15.9 with a hemoglobin of 9.3.  The patient's is currently 

taken DuoNeb about treatments around the clock.  She was taken off the IV 

amiodarone and she is on oral amiodarone 400 mg by mouth twice a day.  She is on

Lipitor 40 mg by mouth daily.  She is also on Plavix 75 mg by mouth daily.  She 

has adequate pain control for now.  She was also started on metoprolol 25 mg by 

mouth twice a day.  Note that the Philadelphia-Lam catheter was removed yesterday.  The

patient continues to have the cordis in her right IJ.








Objective





- Vital Signs


Vital signs: 


                                   Vital Signs











Temp  98.4 F   05/29/22 08:00


 


Pulse  58 L  05/29/22 08:00


 


Resp  26 H  05/29/22 08:00


 


BP  135/53   05/29/22 05:00


 


Pulse Ox  96   05/29/22 08:00


 


FiO2  50   05/27/22 15:24








                                 Intake & Output











 05/28/22 05/29/22 05/29/22





 18:59 06:59 18:59


 


Intake Total 896.252 798.018 26


 


Output Total 470 610 30


 


Balance 426.252 188.018 -4


 


Weight  98.5 kg 


 


Intake:   


 


   312 26


 


    Lactated Ringers 1,000 ml 390 240 20





    @ 20 mls/hr IV .Q24H TERRA   





    Rx#:[id number]   


 


    cardiac output 20  


 


    pressure bags 81 72 6


 


  Intake, IV Titration 5.252 6.018 





  Amount   


 


    Insulin Regular 100 unit 5.252 6.018 





    In Sodium Chloride 0.9%   





    100 ml @ Per Protocol IV   





    .Q0M TERRA Rx#:[id number]   


 


  Oral 400 480 


 


Output:   


 


  Chest Tube Drainage 220 190 


 


    mediastinal and right 220 190 





    pleural   


 


  Urine 250 420 30


 


Other:   


 


  Voiding Method Indwelling Catheter Indwelling Catheter 








                       ABP, PAP, CO, CI - Last Documented











Arterial Blood Pressure        118/46


 


Pulmonary Artery Pressure      28/11


 


Cardiac Output                 4.2


 


Cardiac Index                  2.1

















- Exam








CONSTITUTIONAL: Appears comfortable, cooperative, no acute distress


RESPIRATORY:  Lungs sounds diminished bilaterally.  Respirations even, nonlabore

d.  Currently on 3 L nasal cannula with oxygen saturation 96%.  Able to achieve 

1000 mL on incentive spirometry.  Strong cough.  


CARDIOVASCULAR:  S1, S2 present.  Regular rate and rhythm, sinus rhythm on 

telemetry currently, has been going in and out of A. fib overnight.  Sternum 

stable.   Palpable peripheral pulses bilaterally.  Trace bilateral lower 

extremity edema present.  No calf pain or tenderness noted.  Heart hugger in 

place with patient demonstrating appropriate use.  Antiembolism stockings, SCDs 

present.


GASTROINTESTINAL:  Abdomen soft, nontender, nondistended.  Hypoactive bowel rosalba

nds present 4 quadrants.  Tolerating clear liquid.  Negative flatus, positive 

belching


GENITOURINARY:  Bryson present draining clear, yellow urine.  Output overnight 

30-35 mL per hour, 670 mL last 24 hours


INTEGUMENTARY:  Skin is warm and dry with evidence of good perfusion.  Anterior 

chest incision well approximated and covered with dry intact dressing


NEUROLOGIC:  Cranial nerves II through XII intact


MUSKULOSKELETAL:  Able to move all extremities, strength equal bilaterally


PSYCHIATRIC:  Alert and oriented to person place and time, appropriate affect, 

intact judgment and insight


INVASIVE LINES AND TUBES:  Mediastinal/right pleural chest tubes present and 

connected to wall suction, no air leaks present, 170 mL serosanguineous drainage

overnight, 400 mL since surgery.  A/V epicardial pacemaker wires present, 

connected to generator, AAI mode with backup rate 50 bpm.  Right internal jugula

r Cordis, right radial arterial line present. 








- Labs


CBC & Chem 7: 


                                 05/29/22 04:30





                                 05/29/22 04:30


Labs: 


                  Abnormal Lab Results - Last 24 Hours (Table)











  05/28/22 05/28/22 05/28/22 Range/Units





  09:35 11:08 12:22 


 


WBC     (3.8-10.6)  k/uL


 


RBC     (3.80-5.40)  m/uL


 


Hgb     (11.4-16.0)  gm/dL


 


Hct     (34.0-46.0)  %


 


Neutrophils #     (1.3-7.7)  k/uL


 


Sodium     (137-145)  mmol/L


 


Carbon Dioxide     (22-30)  mmol/L


 


BUN     (7-17)  mg/dL


 


Glucose     (74-99)  mg/dL


 


POC Glucose (mg/dL)  107 H  170 H  129 H  (75-99)  mg/dL


 


Calcium     (8.4-10.2)  mg/dL


 


AST     (14-36)  U/L


 


ALT     (4-34)  U/L


 


Total Protein     (6.3-8.2)  g/dL


 


Albumin     (3.5-5.0)  g/dL














  05/28/22 05/28/22 05/28/22 Range/Units





  13:44 14:58 16:39 


 


WBC     (3.8-10.6)  k/uL


 


RBC     (3.80-5.40)  m/uL


 


Hgb     (11.4-16.0)  gm/dL


 


Hct     (34.0-46.0)  %


 


Neutrophils #     (1.3-7.7)  k/uL


 


Sodium     (137-145)  mmol/L


 


Carbon Dioxide     (22-30)  mmol/L


 


BUN     (7-17)  mg/dL


 


Glucose     (74-99)  mg/dL


 


POC Glucose (mg/dL)  123 H  122 H  116 H  (75-99)  mg/dL


 


Calcium     (8.4-10.2)  mg/dL


 


AST     (14-36)  U/L


 


ALT     (4-34)  U/L


 


Total Protein     (6.3-8.2)  g/dL


 


Albumin     (3.5-5.0)  g/dL














  05/28/22 05/28/22 05/28/22 Range/Units





  18:08 19:16 19:53 


 


WBC     (3.8-10.6)  k/uL


 


RBC     (3.80-5.40)  m/uL


 


Hgb     (11.4-16.0)  gm/dL


 


Hct     (34.0-46.0)  %


 


Neutrophils #     (1.3-7.7)  k/uL


 


Sodium     (137-145)  mmol/L


 


Carbon Dioxide     (22-30)  mmol/L


 


BUN     (7-17)  mg/dL


 


Glucose     (74-99)  mg/dL


 


POC Glucose (mg/dL)  130 H  131 H  127 H  (75-99)  mg/dL


 


Calcium     (8.4-10.2)  mg/dL


 


AST     (14-36)  U/L


 


ALT     (4-34)  U/L


 


Total Protein     (6.3-8.2)  g/dL


 


Albumin     (3.5-5.0)  g/dL














  05/28/22 05/29/22 05/29/22 Range/Units





  22:33 00:24 02:27 


 


WBC     (3.8-10.6)  k/uL


 


RBC     (3.80-5.40)  m/uL


 


Hgb     (11.4-16.0)  gm/dL


 


Hct     (34.0-46.0)  %


 


Neutrophils #     (1.3-7.7)  k/uL


 


Sodium     (137-145)  mmol/L


 


Carbon Dioxide     (22-30)  mmol/L


 


BUN     (7-17)  mg/dL


 


Glucose     (74-99)  mg/dL


 


POC Glucose (mg/dL)  125 H  120 H  117 H  (75-99)  mg/dL


 


Calcium     (8.4-10.2)  mg/dL


 


AST     (14-36)  U/L


 


ALT     (4-34)  U/L


 


Total Protein     (6.3-8.2)  g/dL


 


Albumin     (3.5-5.0)  g/dL














  05/29/22 05/29/22 05/29/22 Range/Units





  04:29 04:30 04:30 


 


WBC   15.9 H   (3.8-10.6)  k/uL


 


RBC   3.28 L   (3.80-5.40)  m/uL


 


Hgb   9.3 L   (11.4-16.0)  gm/dL


 


Hct   29.5 L   (34.0-46.0)  %


 


Neutrophils #   12.4 H   (1.3-7.7)  k/uL


 


Sodium    125 L  (137-145)  mmol/L


 


Carbon Dioxide    21 L  (22-30)  mmol/L


 


BUN    20 H  (7-17)  mg/dL


 


Glucose    105 H  (74-99)  mg/dL


 


POC Glucose (mg/dL)  106 H    (75-99)  mg/dL


 


Calcium    7.7 L  (8.4-10.2)  mg/dL


 


AST    89 H  (14-36)  U/L


 


ALT    40 H  (4-34)  U/L


 


Total Protein    5.2 L  (6.3-8.2)  g/dL


 


Albumin    3.0 L  (3.5-5.0)  g/dL














  05/29/22 05/29/22 Range/Units





  06:13 08:12 


 


WBC    (3.8-10.6)  k/uL


 


RBC    (3.80-5.40)  m/uL


 


Hgb    (11.4-16.0)  gm/dL


 


Hct    (34.0-46.0)  %


 


Neutrophils #    (1.3-7.7)  k/uL


 


Sodium    (137-145)  mmol/L


 


Carbon Dioxide    (22-30)  mmol/L


 


BUN    (7-17)  mg/dL


 


Glucose    (74-99)  mg/dL


 


POC Glucose (mg/dL)  136 H  131 H  (75-99)  mg/dL


 


Calcium    (8.4-10.2)  mg/dL


 


AST    (14-36)  U/L


 


ALT    (4-34)  U/L


 


Total Protein    (6.3-8.2)  g/dL


 


Albumin    (3.5-5.0)  g/dL














Assessment and Plan


Plan: 











Mitral valve annuloplasty, surgery was done for severe symptomatically mitral 

regurgitation the patient is postop day #2.  The patient is currently 

hemodynamically stable on no pressors.  Adequate cardiac output.  Adequate 

hemodynamics.  The patient remains hemodynamically stable on the first day 

postop.  The patient is doing well.  She did go into atrial fibrillation and 

expected outcome of surgery





New onset A. fib expected outcome of surgery  converted into normal sinus rhythm

and the patient is currently on a combination of oral amiodarone and metoprolol.

 No anticoagulants for now.





Post thoracotomy,  extubated and the patient currently has right pleural and 

mediastinal chest tube in place.  No evidence of any pneumothorax.  Currently on

3 L of oxygen by nasal cannula.  The chest x-ray showing elevation of the right 

hemidiaphragm, could be related to right lower lobe atelectasis along with some 

sort effusion.  Chest tubes are still in place.  The patient has a right pleural

chest tube and mediastinal chest tube.





Hypothyroidism





Hypertension





Colonic polyps





Chronic anxiety





Hyponatremia, acute, we'll monitor.





Plan





Currently on 3 L O2 nasal cannula, continue using incentive spirometer


Chest x-ray was reviewed


May remove the mediastinal chest tube


Continue the amiodarone and the metoprolol for now


Use of anticoagulants per cardiology


Monitor the sodium level


Anticoagulation is atrial fibrillation continues to be persistent


[place]-Lam catheter removed 


Advance diet


Adequate pain control


Insulin drip is discontinued and the patient is doing to placed on a sliding 

scale insulin coverage 


We'll continue to follow.

## 2022-05-30 LAB
ALBUMIN SERPL-MCNC: 3.5 G/DL (ref 3.5–5)
ALP SERPL-CCNC: 92 U/L (ref 38–126)
ALT SERPL-CCNC: 59 U/L (ref 4–34)
ANION GAP SERPL CALC-SCNC: 11 MMOL/L
AST SERPL-CCNC: 82 U/L (ref 14–36)
BASOPHILS # BLD AUTO: 0.1 K/UL (ref 0–0.2)
BASOPHILS NFR BLD AUTO: 0 %
BUN SERPL-SCNC: 28 MG/DL (ref 7–17)
CALCIUM SPEC-MCNC: 8 MG/DL (ref 8.4–10.2)
CHLORIDE SERPL-SCNC: 97 MMOL/L (ref 98–107)
CO2 SERPL-SCNC: 22 MMOL/L (ref 22–30)
EOSINOPHIL # BLD AUTO: 0.4 K/UL (ref 0–0.7)
EOSINOPHIL NFR BLD AUTO: 3 %
ERYTHROCYTE [DISTWIDTH] IN BLOOD BY AUTOMATED COUNT: 3.24 M/UL (ref 3.8–5.4)
ERYTHROCYTE [DISTWIDTH] IN BLOOD: 13.5 % (ref 11.5–15.5)
GLUCOSE BLD-MCNC: 111 MG/DL (ref 75–99)
GLUCOSE BLD-MCNC: 114 MG/DL (ref 75–99)
GLUCOSE BLD-MCNC: 132 MG/DL (ref 75–99)
GLUCOSE BLD-MCNC: 142 MG/DL (ref 75–99)
GLUCOSE SERPL-MCNC: 123 MG/DL (ref 74–99)
HCT VFR BLD AUTO: 29.3 % (ref 34–46)
HGB BLD-MCNC: 9.4 GM/DL (ref 11.4–16)
LYMPHOCYTES # SPEC AUTO: 1.3 K/UL (ref 1–4.8)
LYMPHOCYTES NFR SPEC AUTO: 9 %
MCH RBC QN AUTO: 29 PG (ref 25–35)
MCHC RBC AUTO-ENTMCNC: 32 G/DL (ref 31–37)
MCV RBC AUTO: 90.6 FL (ref 80–100)
MONOCYTES # BLD AUTO: 0.8 K/UL (ref 0–1)
MONOCYTES NFR BLD AUTO: 5 %
NEUTROPHILS # BLD AUTO: 12.3 K/UL (ref 1.3–7.7)
NEUTROPHILS NFR BLD AUTO: 82 %
PLATELET # BLD AUTO: 243 K/UL (ref 150–450)
POTASSIUM SERPL-SCNC: 4.5 MMOL/L (ref 3.5–5.1)
PROT SERPL-MCNC: 5.9 G/DL (ref 6.3–8.2)
SODIUM SERPL-SCNC: 130 MMOL/L (ref 137–145)
WBC # BLD AUTO: 15 K/UL (ref 3.8–10.6)

## 2022-05-30 RX ADMIN — HEPARIN SODIUM SCH UNIT: 5000 INJECTION INTRAVENOUS; SUBCUTANEOUS at 23:22

## 2022-05-30 RX ADMIN — IPRATROPIUM BROMIDE AND ALBUTEROL SULFATE SCH ML: .5; 3 SOLUTION RESPIRATORY (INHALATION) at 08:46

## 2022-05-30 RX ADMIN — AMIODARONE HYDROCHLORIDE SCH MG: 200 TABLET ORAL at 21:04

## 2022-05-30 RX ADMIN — PANTOPRAZOLE SODIUM SCH MG: 40 TABLET, DELAYED RELEASE ORAL at 09:03

## 2022-05-30 RX ADMIN — ASPIRIN 325 MG ORAL TABLET SCH MG: 325 PILL ORAL at 09:03

## 2022-05-30 RX ADMIN — CLOPIDOGREL BISULFATE SCH MG: 75 TABLET ORAL at 09:03

## 2022-05-30 RX ADMIN — SODIUM CHLORIDE, PRESERVATIVE FREE SCH ML: 5 INJECTION INTRAVENOUS at 21:04

## 2022-05-30 RX ADMIN — KETOROLAC TROMETHAMINE SCH MG: 15 INJECTION, SOLUTION INTRAMUSCULAR; INTRAVENOUS at 07:12

## 2022-05-30 RX ADMIN — INSULIN ASPART SCH: 100 INJECTION, SOLUTION INTRAVENOUS; SUBCUTANEOUS at 20:58

## 2022-05-30 RX ADMIN — MUPIROCIN SCH APPLIC: 20 OINTMENT TOPICAL at 21:04

## 2022-05-30 RX ADMIN — HEPARIN SODIUM SCH UNIT: 5000 INJECTION INTRAVENOUS; SUBCUTANEOUS at 16:38

## 2022-05-30 RX ADMIN — ONDANSETRON PRN MG: 2 INJECTION INTRAMUSCULAR; INTRAVENOUS at 16:39

## 2022-05-30 RX ADMIN — DOCUSATE SODIUM AND SENNOSIDES SCH EACH: 50; 8.6 TABLET ORAL at 21:04

## 2022-05-30 RX ADMIN — HEPARIN SODIUM SCH UNIT: 5000 INJECTION INTRAVENOUS; SUBCUTANEOUS at 09:03

## 2022-05-30 RX ADMIN — INSULIN ASPART SCH: 100 INJECTION, SOLUTION INTRAVENOUS; SUBCUTANEOUS at 07:12

## 2022-05-30 RX ADMIN — MUPIROCIN SCH APPLIC: 20 OINTMENT TOPICAL at 09:05

## 2022-05-30 RX ADMIN — METOPROLOL TARTRATE SCH MG: 25 TABLET, FILM COATED ORAL at 21:04

## 2022-05-30 RX ADMIN — ATORVASTATIN CALCIUM SCH MG: 40 TABLET, FILM COATED ORAL at 09:03

## 2022-05-30 RX ADMIN — INSULIN ASPART SCH: 100 INJECTION, SOLUTION INTRAVENOUS; SUBCUTANEOUS at 16:39

## 2022-05-30 RX ADMIN — INSULIN ASPART SCH UNIT: 100 INJECTION, SOLUTION INTRAVENOUS; SUBCUTANEOUS at 12:42

## 2022-05-30 RX ADMIN — IPRATROPIUM BROMIDE AND ALBUTEROL SULFATE SCH: .5; 3 SOLUTION RESPIRATORY (INHALATION) at 15:12

## 2022-05-30 RX ADMIN — IPRATROPIUM BROMIDE AND ALBUTEROL SULFATE SCH: .5; 3 SOLUTION RESPIRATORY (INHALATION) at 11:33

## 2022-05-30 RX ADMIN — SODIUM CHLORIDE, PRESERVATIVE FREE SCH ML: 5 INJECTION INTRAVENOUS at 09:15

## 2022-05-30 RX ADMIN — IPRATROPIUM BROMIDE AND ALBUTEROL SULFATE SCH ML: .5; 3 SOLUTION RESPIRATORY (INHALATION) at 19:20

## 2022-05-30 RX ADMIN — KETOROLAC TROMETHAMINE SCH MG: 15 INJECTION, SOLUTION INTRAMUSCULAR; INTRAVENOUS at 14:55

## 2022-05-30 RX ADMIN — AMIODARONE HYDROCHLORIDE SCH MG: 200 TABLET ORAL at 09:03

## 2022-05-30 RX ADMIN — METOPROLOL TARTRATE SCH MG: 25 TABLET, FILM COATED ORAL at 09:03
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
## 2022-05-30 NOTE — XR
EXAMINATION TYPE: XR chest 1V portable

 

DATE OF EXAM: 5/30/2022

 

COMPARISON: 5/29/2022

 

INDICATION: Post cardiac surgery

 

TECHNIQUE: Single frontal view of the chest is obtained.

 

FINDINGS:  

The heart size is mildly prominent.  

The pulmonary vasculature is normal.  

There is elevation of the right diaphragm. Right-sided chest tube is present. Extensive. Mediastinal 
tube is been removed. Sternotomy wires are present from cardiac surgery.  Right central venous cathet
er sheath is present

 

 

IMPRESSION:  

1. Lines and catheters discussed above.

2. No suspicious infiltrates.

## 2022-05-30 NOTE — P.PN
Subjective





HISTORY OF PRESENTING ILLNESS


Patient is a pleasant 62 -year-old female with history of hypertension, GERD, 

hypothyroidism, severe mitral regurgitation.  She has been having increased 

shortness breath over the last 3 months and therefore underwent workup and found

have severe mitral regurgitation.  She underwent mitral valve repair 05/27/2022 

with a 28 mm [employer] AnnuloFlex band, left atrial appendage clip without 

incident.  She was extubated and currently this morning doing well and admitting

to some chest pain around the incision worse with deep inspiration however not 

much shortness breath.  She did have a brief episode of atrial fibrillation was 

placed on amiodarone with conversion back to sinus rhythm.  She did admit to 

palpitations during this episode and admits to some palpitations at home or last

few years which felt similar to this.  No history of stroke or TIA.  She was 

noted to be mildly hyperthyroid on blood work.





5/29


Patient seen and examined.  Patient still having intermittent episodes of atrial

fibrillation with predominantly controlled ventricular rates.  Not overly 

symptomatic.  Denies any chest pain other than the incision.  Shortness breath 

relatively mild.  Does have mild appetite however on clear liquids.





5/30


Patient seen and examined.  Patient denies any significant chest pain, the 

sternotomy incision appears to slowly be improving.  Denies shortness breath. 

Shee is tolerating a diet.  Has remained in sinus rhythm since yesterday.





PHYSICAL EXAMINATION


Vital signs reviewed.


CONSTITUTIONAL: No apparent distress. 


HEENT: Head is normocephalic. Pupils are equal, round. Sclerae anicteric. Mucous

membranes of the mouth are moist.  No JVD. No carotid bruit.


CHEST EXAMINATION: Lungs are clear to auscultation. No chest wall tenderness is 

noted on palpation or with deep breathing. 


HEART EXAMINATION: Regular rate and rhythm. S1, S2 heard. No murmurs, gallops or

rub.


ABDOMEN: Soft, nontender. Positive bowel sounds.


EXTREMITIES: 2+ peripheral pulses, no lower extremity edema and no calf tendern

ess.


NEUROLOGIC EXAMINATION: Patient is awake, alert and oriented x3. 





Assessment


1.  Severe mitral regurgitation status post mitral valve repair


2.  Paroxysmal atrial fibrillation, likely postoperative however additional 

history of similar palpitations in the past


3.  Hypertension


4.  Hypothyroidism currently hyperthyroid likely iatrogenic


5.  Palpitations





Plan:


At this point her CHADSVASC is 2 for hypertension and female however this may 

all be postoperative A. fib and likely exacerbated by the hyperthyroid state.  

She did also had left atrial appendage closure.  At this time continue with 

antiplatelets and if has recurrent episodes further down the road would 

recommend anticoagulation at that time.





Contninue supportive care.  Patient appears to be recovering well.  











Objective





- Vital Signs


Vital signs: 


                                   Vital Signs











Temp  97.9 F   05/30/22 04:00


 


Pulse  63   05/30/22 07:00


 


Resp  15   05/30/22 07:00


 


BP  132/69   05/30/22 07:00


 


Pulse Ox  96   05/30/22 06:00


 


FiO2  50   05/27/22 15:24








                                 Intake & Output











 05/29/22 05/30/22 05/30/22





 18:59 06:59 18:59


 


Intake Total 803 299 


 


Output Total 1200 770 


 


Balance -397 -471 


 


Weight 98.5 kg  


 


Intake:   


 


   299 


 


    Lactated Ringers 1,000 ml 240 260 





    @ 20 mls/hr IV .Q24H [facility]   





    Rx#:[id number]   


 


    pressure bags 63 39 


 


  Oral 500  


 


Output:   


 


  Chest Tube Drainage 60 65 


 


    Pleural Catheter Right 10 65 


 


    mediastinal and right 50  





    pleural   


 


  Urine 1140 705 


 


Other:   


 


  Voiding Method Indwelling Catheter Indwelling Catheter 








                       ABP, PAP, CO, CI - Last Documented











Arterial Blood Pressure        141/75


 


Pulmonary Artery Pressure      28/11


 


Cardiac Output                 4.2


 


Cardiac Index                  2.1

















- Labs


CBC & Chem 7: 


                                 05/29/22 04:30





                                 05/29/22 12:11


Labs: 


                  Abnormal Lab Results - Last 24 Hours (Table)











  05/29/22 05/29/22 05/29/22 Range/Units





  08:12 12:08 12:11 


 


Sodium    127 L  (137-145)  mmol/L


 


Chloride    96 L  ()  mmol/L


 


BUN    23 H  (7-17)  mg/dL


 


Creatinine    1.08 H  (0.52-1.04)  mg/dL


 


Glucose    119 H  (74-99)  mg/dL


 


POC Glucose (mg/dL)  131 H  120 H   (75-99)  mg/dL


 


Calcium    7.7 L  (8.4-10.2)  mg/dL














  05/29/22 05/29/22 05/30/22 Range/Units





  16:10 21:21 07:10 


 


Sodium     (137-145)  mmol/L


 


Chloride     ()  mmol/L


 


BUN     (7-17)  mg/dL


 


Creatinine     (0.52-1.04)  mg/dL


 


Glucose     (74-99)  mg/dL


 


POC Glucose (mg/dL)  118 H  111 H  132 H  (75-99)  mg/dL


 


Calcium     (8.4-10.2)  mg/dL

## 2022-05-30 NOTE — P.PN
Subjective


Progress Note Date: 05/30/22 05/30/2022, the patient is postop day #3 and the patient is doing extremely 

well.  No specific complaints.  Output from the right-sided chest tube is low 

and this too will be taken out.  The chest x-ray from today shows elevation of 

the right hemidiaphragm.  Chest tube is in a good location.  The rhythm is 

stable clean and intact.  Hemodynamically stable and the patient remains in a 

normal sinus rhythm.  She is using incentive spirometer which is pulling 

approximately 1000 and sometimes up to 1500.  The patient is on oxygen at 2 L pH

was able to ambulate yesterday without any major difficulties.  She still has a 

Cordis in her right IJ.  Blood work from today shows a white cell count of 50 

with a hemoglobin of 0.4 and his sodium is at 1:30 with a BUN of 28 and a 

creatinine of 0.9.  LFTs are slightly abnormal with an AST of 82, ALT of 59.  

The patient was seen by cardiothoracic surgery and cardiology.  The patient is 

Amiodarone for Now 400 Mg 2 Twice a Day and the Patient Is Also on Metoprolol 25

Mg by Mouth Twice a Day.  Patient Was Given a Dose of Lasix Today 40 Mg IV Push.

 No Anticoagulants for Now.  We'll Continue to Follow.





Objective





- Vital Signs


Vital signs: 


                                   Vital Signs











Temp  97.9 F   05/30/22 04:00


 


Pulse  63   05/30/22 07:00


 


Resp  15   05/30/22 07:00


 


BP  132/69   05/30/22 07:00


 


Pulse Ox  96   05/30/22 06:00


 


FiO2  50   05/27/22 15:24








                                 Intake & Output











 05/29/22 05/30/22 05/30/22





 18:59 06:59 18:59


 


Intake Total 803 299 


 


Output Total 1200 770 


 


Balance -397 -471 


 


Weight 98.5 kg 97.9 kg 


 


Intake:   


 


   299 


 


    Lactated Ringers 1,000 ml 240 260 





    @ 20 mls/hr IV .Q24H [facility]   





    Rx#:[id number]   


 


    pressure bags 63 39 


 


  Oral 500  


 


Output:   


 


  Chest Tube Drainage 60 65 


 


    Pleural Catheter Right 10 65 


 


    mediastinal and right 50  





    pleural   


 


  Urine 1140 705 


 


Other:   


 


  Voiding Method Indwelling Catheter Indwelling Catheter 








                       ABP, PAP, CO, CI - Last Documented











Arterial Blood Pressure        141/75


 


Pulmonary Artery Pressure      28/11


 


Cardiac Output                 4.2


 


Cardiac Index                  2.1

















- Exam








CONSTITUTIONAL: Appears comfortable, cooperative, no acute distress


RESPIRATORY:  Lungs sounds diminished bilaterally.  Respirations even, 

nonlabored.  Currently on 2 L nasal cannula with oxygen saturation 96%.  Able to

achieve 1250 mL on incentive spirometry.  Strong cough.  


CARDIOVASCULAR:  S1, S2 present.  Regular rate and rhythm, sinus rhythm on 

telemetry.  Sternum stable.   Palpable peripheral pulses bilaterally.  Trace 

bilateral lower extremity edema present.  No calf pain or tenderness noted.  He

art hugger in place with patient demonstrating appropriate use.  Antiembolism 

stockings, SCDs present.


GASTROINTESTINAL:  Abdomen soft, nontender, nondistended.  Active bowel sounds 

present 4 quadrants.  Tolerating diet.  Positive flatus


GENITOURINARY:  Bryson present draining clear, yellow urine.  Output overnight 

40-70 mL per hour, 1765 mL last 24 hours


INTEGUMENTARY:  Skin is warm and dry with evidence of good perfusion.  Anterior 

chest incision well approximated and covered with dry intact dressing


NEUROLOGIC:  Cranial nerves II through XII intact


MUSKULOSKELETAL:  Able to move all extremities, strength equal bilaterally


PSYCHIATRIC:  Alert and oriented to person place and time, appropriate affect, 

intact judgment and insight


INVASIVE LINES AND TUBES:  Right pleural chest tubes present and connected to 

wall suction, no air leaks present, 55 mL serosanguineous drainage overnight, 

110 mL in the last 24 hours.  A/V epicardial pacemaker wires present, connected 

to generator, AAI mode with backup rate 50 bpm.  Right internal jugular Cordis 

present. 





- Labs


CBC & Chem 7: 


                                 05/30/22 07:04





                                 05/30/22 07:04


Labs: 


                  Abnormal Lab Results - Last 24 Hours (Table)











  05/29/22 05/29/22 05/29/22 Range/Units





  12:08 12:11 16:10 


 


WBC     (3.8-10.6)  k/uL


 


RBC     (3.80-5.40)  m/uL


 


Hgb     (11.4-16.0)  gm/dL


 


Hct     (34.0-46.0)  %


 


Neutrophils #     (1.3-7.7)  k/uL


 


Sodium   127 L   (137-145)  mmol/L


 


Chloride   96 L   ()  mmol/L


 


BUN   23 H   (7-17)  mg/dL


 


Creatinine   1.08 H   (0.52-1.04)  mg/dL


 


Glucose   119 H   (74-99)  mg/dL


 


POC Glucose (mg/dL)  120 H   118 H  (75-99)  mg/dL


 


Calcium   7.7 L   (8.4-10.2)  mg/dL


 


AST     (14-36)  U/L


 


ALT     (4-34)  U/L


 


Total Protein     (6.3-8.2)  g/dL














  05/29/22 05/30/22 05/30/22 Range/Units





  21:21 07:04 07:04 


 


WBC   15.0 H   (3.8-10.6)  k/uL


 


RBC   3.24 L   (3.80-5.40)  m/uL


 


Hgb   9.4 L   (11.4-16.0)  gm/dL


 


Hct   29.3 L   (34.0-46.0)  %


 


Neutrophils #   12.3 H   (1.3-7.7)  k/uL


 


Sodium    130 L  (137-145)  mmol/L


 


Chloride    97 L  ()  mmol/L


 


BUN    28 H  (7-17)  mg/dL


 


Creatinine     (0.52-1.04)  mg/dL


 


Glucose    123 H  (74-99)  mg/dL


 


POC Glucose (mg/dL)  111 H    (75-99)  mg/dL


 


Calcium    8.0 L  (8.4-10.2)  mg/dL


 


AST    82 H  (14-36)  U/L


 


ALT    59 H  (4-34)  U/L


 


Total Protein    5.9 L  (6.3-8.2)  g/dL














  05/30/22 Range/Units





  07:10 


 


WBC   (3.8-10.6)  k/uL


 


RBC   (3.80-5.40)  m/uL


 


Hgb   (11.4-16.0)  gm/dL


 


Hct   (34.0-46.0)  %


 


Neutrophils #   (1.3-7.7)  k/uL


 


Sodium   (137-145)  mmol/L


 


Chloride   ()  mmol/L


 


BUN   (7-17)  mg/dL


 


Creatinine   (0.52-1.04)  mg/dL


 


Glucose   (74-99)  mg/dL


 


POC Glucose (mg/dL)  132 H  (75-99)  mg/dL


 


Calcium   (8.4-10.2)  mg/dL


 


AST   (14-36)  U/L


 


ALT   (4-34)  U/L


 


Total Protein   (6.3-8.2)  g/dL














Assessment and Plan


Plan: 











Mitral valve annuloplasty, surgery was done for severe symptomatically mitral 

regurgitation the patient is postop day #3.  The patient is currently 

hemodynamically stable on no pressors.  Adequate cardiac output.  Adequate 

hemodynamics.  The patient remains hemodynamically stable on the first day 

postop.  The patient is doing well.  She did go into atrial fibrillation and 

expected outcome of surgery, her cardiac rhythm remains sinus over the past 48 

hours.  The patient has a single right-sided chest tube.





New onset A. fib expected outcome of surgery  converted into normal sinus rhythm

and the patient is currently on a combination of oral amiodarone and metoprolol.

 No anticoagulants for now.  The cardiac rhythm remains sinus





Post thoracotomy,  extubated and the patient currently has right pleural and 

mediastinal chest tube in place.  No evidence of any pneumothorax.  Currently on

3 L of oxygen by nasal cannula.  The chest x-ray showing elevation of the right 

hemidiaphragm, could be related to right lower lobe atelectasis  versus 

diaphragmatic paralysis.  The right-sided chest tube is still in place and there

is no evidence of any air leak and the output is minimal and that she will 

likely be removed today.





Hypothyroidism





Hypertension





Colonic polyps





Chronic anxiety





Hyponatremia, acute, we'll monitor.





Plan





Currently on 2 L O2 nasal cannula, continue using incentive spirometer


Chest x-ray was reviewed


May remove the right pleural chest tube


Continue the amiodarone and the metoprolol for now


No anticoagulants per cardiology


Monitor the sodium level, sodium level is up to 1:30





Advance diet


Adequate pain control


Continue using incentive spirometer 


We'll continue to follow.

## 2022-05-30 NOTE — P.PN
Subjective


Progress Note Date: 22


Principal diagnosis: 





Severe mitral regurgitation with dilated mitral annulus.  Previous medical 

history of hypertension, hypothyroid, remote history of pneumonia, occasional 

EtOH use, never smoker, family history of premature coronary artery disease-

father with first MI at 42 years old,  from MI at 55 years old.  Vaccinated 

against Covid.  Preoperative nasal swab positive for MSSA





POD #3 mitral valve repair with a #28 mm [employer] AnnuloFlex band, clip 

ligation of the left atrial appendage with a 35 mm AtriClip and intraoperative 

transesophageal echocardiogram





Postoperative acute blood loss anemia, expected given hemodilution and 

cardiopulmonary bypass pump





Paroxysmal atrial fibrillation, known common occurrence after open heart surgery





The patient was seen and examined sitting up in a recliner this morning in the 

intensive care unit in no acute distress.  She is currently in sinus rhythm, no 

further atrial fibrillation noted, denies shortness of breath.  She does 

complain of postsurgical chest pain but states it is controlled on current 

medication regimen and is better since removing mediastinal chest tube.  

Hemodynamically stable.  She did ambulate in the hallway yesterday with assist. 

Right internal jugular Cordis, right pleural chest tube remains.





Objective





- Vital Signs


Vital signs: 


                                   Vital Signs











Temp  97.9 F   22 04:00


 


Pulse  63   22 07:00


 


Resp  15   22 07:00


 


BP  132/69   22 07:00


 


Pulse Ox  96   22 06:00


 


FiO2  50   22 15:24








                                 Intake & Output











 22





 18:59 06:59 18:59


 


Intake Total 803 299 


 


Output Total 1200 770 


 


Balance -397 -471 


 


Weight 98.5 kg  


 


Intake:   


 


   299 


 


    Lactated Ringers 1,000 ml 240 260 





    @ 20 mls/hr IV .Q24H [facility]   





    Rx#:[id number]   


 


    pressure bags 63 39 


 


  Oral 500  


 


Output:   


 


  Chest Tube Drainage 60 65 


 


    Pleural Catheter Right 10 65 


 


    mediastinal and right 50  





    pleural   


 


  Urine 1140 705 


 


Other:   


 


  Voiding Method Indwelling Catheter Indwelling Catheter 








                       ABP, PAP, CO, CI - Last Documented











Arterial Blood Pressure        141/75


 


Pulmonary Artery Pressure      28/11


 


Cardiac Output                 4.2


 


Cardiac Index                  2.1

















- Exam





CONSTITUTIONAL: Appears comfortable, cooperative, no acute distress


RESPIRATORY:  Lungs sounds diminished bilaterally.  Respirations even, nonla

bored.  Currently on 2 L nasal cannula with oxygen saturation 96%.  Able to 

achieve 1250 mL on incentive spirometry.  Strong cough.  


CARDIOVASCULAR:  S1, S2 present.  Regular rate and rhythm, sinus rhythm on 

telemetry.  Sternum stable.   Palpable peripheral pulses bilaterally.  Trace 

bilateral lower extremity edema present.  No calf pain or tenderness noted.  

Heart hugger in place with patient demonstrating appropriate use.  Antiembolism 

stockings, SCDs present.


GASTROINTESTINAL:  Abdomen soft, nontender, nondistended.  Active bowel sounds 

present 4 quadrants.  Tolerating diet.  Positive flatus


GENITOURINARY:  Bryson present draining clear, yellow urine.  Output overnight 

40-70 mL per hour, 1765 mL last 24 hours


INTEGUMENTARY:  Skin is warm and dry with evidence of good perfusion.  Anterior 

chest incision well approximated and covered with dry intact dressing


NEUROLOGIC:  Cranial nerves II through XII intact


MUSKULOSKELETAL:  Able to move all extremities, strength equal bilaterally


PSYCHIATRIC:  Alert and oriented to person place and time, appropriate affect, 

intact judgment and insight


INVASIVE LINES AND TUBES:  Right pleural chest tubes present and connected to 

wall suction, no air leaks present, 55 mL serosanguineous drainage overnight, 

110 mL in the last 24 hours.  A/V epicardial pacemaker wires present, connected 

to generator, AAI mode with backup rate 50 bpm.  Right internal jugular Cordis 

present. 





- Allied health notes


Allied health notes reviewed: nursing





- Labs


CBC & Chem 7: 


                                 22 07:04





                                 22 07:04


Labs: 


                  Abnormal Lab Results - Last 24 Hours (Table)











  22 Range/Units





  08:12 12:08 12:11 


 


Sodium    127 L  (137-145)  mmol/L


 


Chloride    96 L  ()  mmol/L


 


BUN    23 H  (7-17)  mg/dL


 


Creatinine    1.08 H  (0.52-1.04)  mg/dL


 


Glucose    119 H  (74-99)  mg/dL


 


POC Glucose (mg/dL)  131 H  120 H   (75-99)  mg/dL


 


Calcium    7.7 L  (8.4-10.2)  mg/dL














  22 Range/Units





  16:10 21:21 07:10 


 


Sodium     (137-145)  mmol/L


 


Chloride     ()  mmol/L


 


BUN     (7-17)  mg/dL


 


Creatinine     (0.52-1.04)  mg/dL


 


Glucose     (74-99)  mg/dL


 


POC Glucose (mg/dL)  118 H  111 H  132 H  (75-99)  mg/dL


 


Calcium     (8.4-10.2)  mg/dL














- Imaging and Cardiology


Chest x-ray: image reviewed





Assessment and Plan


Assessment: 





1.  Severe mitral regurgitation with dilated mitral annulus, status post mitral 

valve repair


2.  Hypertension


3.  History of hypothyroid, currently hyperthyroid with TSH 0.036 on 2022,

0.062 with FT4 1.49 on 2022, has been off Synthroid per her primary since 




4.  Remote history of pneumonia


5.  Occasional EtOH use, no history of withdrawal


6.  Never smoker, preoperative FEV1 111% of predicted


7.  Family history of premature coronary artery disease-father with first MI at 

42 years old,  from MI at 55 years old


8.  Vaccinated against Covid


9.  Preoperative nasal swab positive for MSSA, treated with mupirocin


10.  Postoperative acute blood loss anemia, expected 


11.  Paroxysmal atrial fibrillation, known common occurrence after open heart 

surgery, status post ligation of the left atrial appendage


Plan: 





1.  Continue aspirin, statin, beta blocker therapy.  Will increase beta blocker 

therapy as tolerated


2.  Continue amiodarone for A. fib prophylaxis.  No anticoagulation at this time

but may need at discharge


3.  Wean O2 as tolerated.  Encourage incentive spirometry is 10 times every hour

while awake.  Bronchodilators per pulmonology


4.  Increase activity, ambulate as tolerated.  PT/OT/cardiac rehab consulted


5.  Will monitor daily labs and x-rays.  Electrolyte replacement per protocol. 

Will give IV lasix


6.  GI/DVT prophylaxis


7.  Pain control with current medication regimen


8.  Insulin management per primary care service.  Patient is not diabetic, 

hemoglobin A1c 5.7%


9.  Will discontinue right pleural chest tube. Discontinue cordis. Ground 

epicardial pacer wires


10.  Discontinue Bryson, may bladder scan and straight cath for >300 mL residual


11.  Strict accurate intake and output.  Daily weights


12.  Continue to hold Synthroid for now.  Management per primary care


13.  More recommendations to follow as patient progresses

## 2022-05-31 LAB
ALBUMIN SERPL-MCNC: 3.3 G/DL (ref 3.5–5)
ALP SERPL-CCNC: 118 U/L (ref 38–126)
ALT SERPL-CCNC: 66 U/L (ref 4–34)
ANION GAP SERPL CALC-SCNC: 7 MMOL/L
AST SERPL-CCNC: 67 U/L (ref 14–36)
BUN SERPL-SCNC: 22 MG/DL (ref 7–17)
CALCIUM SPEC-MCNC: 8 MG/DL (ref 8.4–10.2)
CHLORIDE SERPL-SCNC: 99 MMOL/L (ref 98–107)
CO2 SERPL-SCNC: 27 MMOL/L (ref 22–30)
ERYTHROCYTE [DISTWIDTH] IN BLOOD BY AUTOMATED COUNT: 3.11 M/UL (ref 3.8–5.4)
ERYTHROCYTE [DISTWIDTH] IN BLOOD: 13.5 % (ref 11.5–15.5)
GLUCOSE BLD-MCNC: 100 MG/DL (ref 75–99)
GLUCOSE BLD-MCNC: 106 MG/DL (ref 75–99)
GLUCOSE BLD-MCNC: 117 MG/DL (ref 75–99)
GLUCOSE BLD-MCNC: 140 MG/DL (ref 75–99)
GLUCOSE SERPL-MCNC: 101 MG/DL (ref 74–99)
HCT VFR BLD AUTO: 28.2 % (ref 34–46)
HGB BLD-MCNC: 9 GM/DL (ref 11.4–16)
MCH RBC QN AUTO: 28.9 PG (ref 25–35)
MCHC RBC AUTO-ENTMCNC: 31.9 G/DL (ref 31–37)
MCV RBC AUTO: 90.6 FL (ref 80–100)
PLATELET # BLD AUTO: 252 K/UL (ref 150–450)
POTASSIUM SERPL-SCNC: 4.1 MMOL/L (ref 3.5–5.1)
PROT SERPL-MCNC: 5.6 G/DL (ref 6.3–8.2)
SODIUM SERPL-SCNC: 133 MMOL/L (ref 137–145)
WBC # BLD AUTO: 11 K/UL (ref 3.8–10.6)

## 2022-05-31 RX ADMIN — SODIUM CHLORIDE, PRESERVATIVE FREE SCH ML: 5 INJECTION INTRAVENOUS at 08:54

## 2022-05-31 RX ADMIN — ONDANSETRON PRN MG: 2 INJECTION INTRAMUSCULAR; INTRAVENOUS at 16:39

## 2022-05-31 RX ADMIN — AMIODARONE HYDROCHLORIDE SCH MG: 200 TABLET ORAL at 20:50

## 2022-05-31 RX ADMIN — ATORVASTATIN CALCIUM SCH MG: 40 TABLET, FILM COATED ORAL at 08:54

## 2022-05-31 RX ADMIN — IPRATROPIUM BROMIDE AND ALBUTEROL SULFATE SCH ML: .5; 3 SOLUTION RESPIRATORY (INHALATION) at 15:24

## 2022-05-31 RX ADMIN — SERTRALINE HYDROCHLORIDE SCH MG: 50 TABLET, FILM COATED ORAL at 13:02

## 2022-05-31 RX ADMIN — METOPROLOL TARTRATE SCH MG: 25 TABLET, FILM COATED ORAL at 08:54

## 2022-05-31 RX ADMIN — HEPARIN SODIUM SCH UNIT: 5000 INJECTION INTRAVENOUS; SUBCUTANEOUS at 23:52

## 2022-05-31 RX ADMIN — SODIUM CHLORIDE, PRESERVATIVE FREE SCH ML: 5 INJECTION INTRAVENOUS at 20:51

## 2022-05-31 RX ADMIN — IPRATROPIUM BROMIDE AND ALBUTEROL SULFATE SCH ML: .5; 3 SOLUTION RESPIRATORY (INHALATION) at 11:40

## 2022-05-31 RX ADMIN — INSULIN ASPART SCH: 100 INJECTION, SOLUTION INTRAVENOUS; SUBCUTANEOUS at 12:46

## 2022-05-31 RX ADMIN — IPRATROPIUM BROMIDE AND ALBUTEROL SULFATE SCH ML: .5; 3 SOLUTION RESPIRATORY (INHALATION) at 07:36

## 2022-05-31 RX ADMIN — CLOPIDOGREL BISULFATE SCH MG: 75 TABLET ORAL at 08:54

## 2022-05-31 RX ADMIN — HEPARIN SODIUM SCH UNIT: 5000 INJECTION INTRAVENOUS; SUBCUTANEOUS at 08:53

## 2022-05-31 RX ADMIN — IPRATROPIUM BROMIDE AND ALBUTEROL SULFATE SCH ML: .5; 3 SOLUTION RESPIRATORY (INHALATION) at 20:43

## 2022-05-31 RX ADMIN — METOPROLOL TARTRATE SCH MG: 25 TABLET, FILM COATED ORAL at 20:51

## 2022-05-31 RX ADMIN — HEPARIN SODIUM SCH UNIT: 5000 INJECTION INTRAVENOUS; SUBCUTANEOUS at 16:38

## 2022-05-31 RX ADMIN — ASPIRIN 325 MG ORAL TABLET SCH MG: 325 PILL ORAL at 08:54

## 2022-05-31 RX ADMIN — INSULIN ASPART SCH: 100 INJECTION, SOLUTION INTRAVENOUS; SUBCUTANEOUS at 06:35

## 2022-05-31 RX ADMIN — AMIODARONE HYDROCHLORIDE SCH MG: 200 TABLET ORAL at 08:54

## 2022-05-31 RX ADMIN — INSULIN ASPART SCH: 100 INJECTION, SOLUTION INTRAVENOUS; SUBCUTANEOUS at 17:28

## 2022-05-31 RX ADMIN — PANTOPRAZOLE SODIUM SCH MG: 40 TABLET, DELAYED RELEASE ORAL at 06:35

## 2022-05-31 RX ADMIN — DOCUSATE SODIUM AND SENNOSIDES SCH EACH: 50; 8.6 TABLET ORAL at 20:50

## 2022-05-31 RX ADMIN — INSULIN ASPART SCH UNIT: 100 INJECTION, SOLUTION INTRAVENOUS; SUBCUTANEOUS at 20:51

## 2022-05-31 NOTE — PN
PROGRESS NOTE



Mrs Munguia is status post mitral valve repair with a ring.  She is doing well.  She

had a brief episode of atrial fibrillation but in sinus rhythm, hemodynamically stable,

doing well on no pressors. Possible discharge soon. Vitals are stable. JVD not evident.

S1-S2 heard normally. Short systolic murmur at the apex. Lungs reveal decent air entry.

Abdomen is soft.  Lower extremities reveal diminished pulses.  Central nervous system:

Grossly no focal deficits.  Good progress post surgery.  Will continue to see the

patient as needed.





MMODL / IJN: [id number] / Job#: [id number]

## 2022-05-31 NOTE — P.PN
Subjective


Progress Note Date: 22


Principal diagnosis: 





Severe mitral regurgitation with dilated mitral annulus.  Previous medical 

history of hypertension, hypothyroid, remote history of pneumonia, occasional 

EtOH use, never smoker, family history of premature coronary artery disease-

father with first MI at 42 years old,  from MI at 53 years old.  Vaccinated 

against Covid.  Preoperative nasal swab positive for MSSA





POD #4 mitral valve repair with a #28 mm [employer] AnnuloFlex band, clip 

ligation of the left atrial appendage with a 35 mm AtriClip and intraoperative 

transesophageal echocardiogram





Postoperative acute blood loss anemia, expected given hemodilution and 

cardiopulmonary bypass pump





Paroxysmal atrial fibrillation, known common occurrence after open heart surgery





The patient was seen and examined sitting up in a recliner this morning in the 

intensive care unit in no acute distress eating breakfast.  She remains in sinus

rhythm, no further atrial fibrillation noted, denies shortness of breath.  She 

does complain of postsurgical chest pain but states it is controlled on current 

medication regimen.  She did admit to a panic attack last night, was given Xanax

as per her home dose and states it did help.  Hemodynamically stable.  She did 

ambulate in the hallway yesterday.  Remains on 2 L nasal cannula with oxygen 

saturation in the mid to high 90s, on room air she was 88%.  Hoping to go home 

soon.





Objective





- Vital Signs


Vital signs: 


                                   Vital Signs











Temp  98.6 F   22 20:00


 


Pulse  60   22 07:36


 


Resp  18   22 07:36


 


BP  126/64   22 02:00


 


Pulse Ox  99   22 07:36


 


FiO2  50   22 15:24








                                 Intake & Output











 22





 18:59 06:59 18:59


 


Intake Total 656 50 


 


Output Total 700  


 


Balance -44 50 


 


Intake:   


 


  IV 6  


 


    pressure bags 6  


 


  Oral 650 50 


 


Output:   


 


  Urine 700  


 


Other:   


 


  Voiding Method Indwelling Catheter  


 


  # Voids 1 1 








                       ABP, PAP, CO, CI - Last Documented











Arterial Blood Pressure        141/75


 


Pulmonary Artery Pressure      28/11


 


Cardiac Output                 4.2


 


Cardiac Index                  2.1

















- Exam





CONSTITUTIONAL: Appears comfortable, cooperative, no acute distress


RESPIRATORY:  Lungs sounds diminished bilaterally, right greater than left.  

Respirations even, nonlabored.  Currently on 2 L nasal cannula with oxygen 

saturation 94%.  Able to achieve 1250 mL on incentive spirometry.  Strong cough.

 


CARDIOVASCULAR:  S1, S2 present.  Regular rate and rhythm, sinus rhythm on t

elemetry.  Sternum stable.   Palpable peripheral pulses bilaterally.  Trace 

bilateral lower extremity edema present.  No calf pain or tenderness noted.  

Heart hugger in place with patient demonstrating appropriate use.  Antiembolism 

stockings, SCDs present.


GASTROINTESTINAL:  Abdomen soft, nontender, nondistended.  Active bowel sounds 

present 4 quadrants.  Tolerating diet.  Positive bowel movement last night per 

patient


GENITOURINARY:  Bryson discontinued yesterday, patient has voided although was 

not measured


INTEGUMENTARY:  Skin is warm and dry with evidence of good perfusion.  Anterior 

chest incision well approximated and covered with dry intact dressing


NEUROLOGIC:  Cranial nerves II through XII intact


MUSKULOSKELETAL:  Able to move all extremities, strength equal bilaterally


PSYCHIATRIC:  Alert and oriented to person place and time, appropriate affect, 

intact judgment and insight


INVASIVE LINES AND TUBES:  A/V epicardial pacemaker wires present, grounded





- Allied health notes


Allied health notes reviewed: nursing





- Labs


CBC & Chem 7: 


                                 22 06:25





                                 22 06:25


Labs: 


                  Abnormal Lab Results - Last 24 Hours (Table)











  22 Range/Units





  07:04 11:45 16:36 


 


WBC     (3.8-10.6)  k/uL


 


RBC     (3.80-5.40)  m/uL


 


Hgb     (11.4-16.0)  gm/dL


 


Hct     (34.0-46.0)  %


 


Sodium  130 L    (137-145)  mmol/L


 


Chloride  97 L    ()  mmol/L


 


BUN  28 H    (7-17)  mg/dL


 


Glucose  123 H    (74-99)  mg/dL


 


POC Glucose (mg/dL)   142 H  114 H  (75-99)  mg/dL


 


Calcium  8.0 L    (8.4-10.2)  mg/dL


 


AST  82 H    (14-36)  U/L


 


ALT  59 H    (4-34)  U/L


 


Total Protein  5.9 L    (6.3-8.2)  g/dL


 


Albumin     (3.5-5.0)  g/dL














  22 Range/Units





  20:57 06:25 06:25 


 


WBC   11.0 H   (3.8-10.6)  k/uL


 


RBC   3.11 L   (3.80-5.40)  m/uL


 


Hgb   9.0 L   (11.4-16.0)  gm/dL


 


Hct   28.2 L   (34.0-46.0)  %


 


Sodium    133 L  (137-145)  mmol/L


 


Chloride     ()  mmol/L


 


BUN    22 H  (7-17)  mg/dL


 


Glucose    101 H  (74-99)  mg/dL


 


POC Glucose (mg/dL)  111 H    (75-99)  mg/dL


 


Calcium    8.0 L  (8.4-10.2)  mg/dL


 


AST    67 H  (14-36)  U/L


 


ALT    66 H  (4-34)  U/L


 


Total Protein    5.6 L  (6.3-8.2)  g/dL


 


Albumin    3.3 L  (3.5-5.0)  g/dL














  22 Range/Units





  06:33 


 


WBC   (3.8-10.6)  k/uL


 


RBC   (3.80-5.40)  m/uL


 


Hgb   (11.4-16.0)  gm/dL


 


Hct   (34.0-46.0)  %


 


Sodium   (137-145)  mmol/L


 


Chloride   ()  mmol/L


 


BUN   (7-17)  mg/dL


 


Glucose   (74-99)  mg/dL


 


POC Glucose (mg/dL)  106 H  (75-99)  mg/dL


 


Calcium   (8.4-10.2)  mg/dL


 


AST   (14-36)  U/L


 


ALT   (4-34)  U/L


 


Total Protein   (6.3-8.2)  g/dL


 


Albumin   (3.5-5.0)  g/dL














- Imaging and Cardiology


Chest x-ray: image reviewed





Assessment and Plan


Assessment: 





1.  Severe mitral regurgitation with dilated mitral annulus, status post mitral 

valve repair


2.  Hypertension


3.  History of hypothyroid, currently hyperthyroid with TSH 0.036 on 2022,

0.062 with FT4 1.49 on 2022, has been off Synthroid per her primary since 




4.  Remote history of pneumonia


5.  Occasional EtOH use, no history of withdrawal


6.  Never smoker, preoperative FEV1 111% of predicted


7.  Family history of premature coronary artery disease-father with first MI at 

42 years old,  from MI at 55 years old


8.  Vaccinated against Covid


9.  Preoperative nasal swab positive for MSSA, treated with mupirocin


10.  Postoperative acute blood loss anemia, expected 


11.  Paroxysmal atrial fibrillation, known common occurrence after open heart 

surgery, status post ligation of the left atrial appendage


Plan: 





1.  Continue aspirin, statin, beta blocker therapy.  Will increase beta blocker 

therapy as tolerated


2.  Continue amiodarone for A. fib prophylaxis.  No anticoagulation at this time




3.  Wean O2 as tolerated.  Encourage incentive spirometry is 10 times every hour

while awake.  Bronchodilators per pulmonology


4.  Increase activity, ambulate as tolerated.  PT/OT/cardiac rehab following


5.  Will monitor daily labs and x-rays.  Electrolyte replacement per protocol. 

Will give IV lasix


6.  GI/DVT prophylaxis


7.  Pain control with current medication regimen


8.  Insulin management per primary care service.  Patient is not diabetic, 

hemoglobin A1c 5.7%


9.  Strict accurate intake and output.  Daily weights


10.  Synthroid management per primary care


11.  Transfer orders placed yesterday for [address] [place] [address] cardiac stepdown unit, may 

transfer when bed available


12.  Discharge planning in progress, likely will discharge later this afternoon 

versus tomorrow


13.  More recommendations to follow as patient progresses
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
## 2022-05-31 NOTE — XR
EXAMINATION TYPE: XR chest 2V

 

DATE OF EXAM: 5/31/2022

 

COMPARISON: Chest x-ray 5/30/2022

 

HISTORY: Status post cardiac surgery, chest tube removal

 

TECHNIQUE:  Frontal and lateral views of the chest are obtained.

 

FINDINGS:  There are overlying artifacts. Patient is post median sternotomy. Right-sided chest tube h
as been removed. Right hemidiaphragm remains elevated. No sizable pneumothorax. Some patchy basilar d
ensity present within the right hemithorax. Cardiac mediastinal silhouette is stable, left atrial steph
endage clip change noted. Aorta is dense. Central venous sheath has been removed.

 

IMPRESSION:  No evident complication status post chest tube removal. There may be some minimal residu
al atelectasis, effusion

## 2022-05-31 NOTE — P.PN
Subjective


Progress Note Date: 05/31/22


Principal diagnosis: 


 Mitral valve annuloplasty





 On 05/31/2022 patient seen in follow-up in intensive care unit.  Today is 

postoperative day #4, status post mitral valve annuloplasty for severe mitral 

regurgitation.  Patient is doing very well, she is breathing comfortably, she is

on 2 L of oxygen with a pulse ox of 97-99%, hemodynamically she stable, she is 

in sinus mechanism, there has been no recurrence of arrhythmias or atrial 

fibrillation overnight.  She is not on any IV fluids, IV fluids have been hep-

locked.  She is achieving 1.5 L on the incentive spirometer, lung sounds are 

clear to auscultation.  Today's chest x-ray showing minimal residual atelectasis

with effusion, chest tubes have been removed, Elver incision is clean dry and

intact.  Indwelling catheter has been removed, patient has been voiding.  

Patient has been tolerating ambulation, today's labs again reviewed showing 

improvement in white count which is down to 11.0, hemoglobin of 9.0, sodium of 

133, potassium is 4.1, chloride is 99, CO2 is 27, B1 is 22 creatinine 0.87.  

Patient is tolerating oral intake, no nausea vomiting or diarrhea, no abdominal 

pain.








Objective





- Vital Signs


Vital signs: 


                                   Vital Signs











Temp  98 F   05/31/22 10:00


 


Pulse  61   05/31/22 10:00


 


Resp  4 L  05/31/22 10:00


 


BP  127/56   05/31/22 10:00


 


Pulse Ox  97   05/31/22 10:00


 


FiO2  50   05/27/22 15:24








                                 Intake & Output











 05/30/22 05/31/22 05/31/22





 18:59 06:59 18:59


 


Intake Total 656 50 0


 


Output Total 700  


 


Balance -44 50 0


 


Intake:   


 


  IV 6  


 


    pressure bags 6  


 


  Intake, IV Titration   0





  Amount   


 


    Calcium Gluconate in NaCl   0





    2 gm In Saline 1 100ml.   





    bag @ 100 mls/hr IVPB   





    ONCE PRN Rx#:[id number]   


 


  Oral 650 50 


 


Output:   


 


  Urine 700  


 


Other:   


 


  Voiding Method Indwelling Catheter  


 


  # Voids 1 1 








                       ABP, PAP, CO, CI - Last Documented











Arterial Blood Pressure        141/75


 


Pulmonary Artery Pressure      28/11


 


Cardiac Output                 4.2


 


Cardiac Index                  2.1

















- Exam


 GENERAL EXAM: Alert, very pleasant, 62-year-old white female, sitting up in the

recliner, breathing comfortably on 2 L of oxygen satting 97%, comfortable in no 

apparent distress.


HEAD: Normocephalic/atraumatic.


EYES: Normal reaction of pupils, equal size.  Conjunctiva pink, sclera white.


NOSE: Clear with pink turbinates.


THROAT: No erythema or exudates.


NECK: No masses, no JVD, no thyroid enlargement, no adenopathy.


CHEST: No chest wall deformity.  Symmetrical expansion.  Midsternal incision is 

clean dry and intact, chest tube sites are clean dry and intact, covered with 

dressings, chest tubes have been removed


LUNGS: Equal air entry with no crackles, wheeze, rhonchi or dullness.


CVS: Regular rate and rhythm, normal S1 and S2, no gallops, no murmurs, no rubs


ABDOMEN: Soft, nontender.  No hepatosplenomegaly, normal bowel sounds, no 

guarding or rigidity.


EXTREMITIES: No clubbing, no edema, no cyanosis, 2+ pulses and upper and lower 

extremities.


MUSCULOSKELETAL: Muscle strength and tone normal.


SPINE: No scoliosis or deformity


SKIN: No rashes


CENTRAL NERVOUS SYSTEM: Alert and oriented -3.  No focal deficits, tone is 

normal in all 4 extremities.


PSYCHIATRIC: Alert and oriented -3.  Appropriate affect.  Intact judgment and 

insight.











- Labs


CBC & Chem 7: 


                                 05/31/22 06:25





                                 05/31/22 06:25


Labs: 


                  Abnormal Lab Results - Last 24 Hours (Table)











  05/30/22 05/30/22 05/30/22 Range/Units





  11:45 16:36 20:57 


 


WBC     (3.8-10.6)  k/uL


 


RBC     (3.80-5.40)  m/uL


 


Hgb     (11.4-16.0)  gm/dL


 


Hct     (34.0-46.0)  %


 


Sodium     (137-145)  mmol/L


 


BUN     (7-17)  mg/dL


 


Glucose     (74-99)  mg/dL


 


POC Glucose (mg/dL)  142 H  114 H  111 H  (75-99)  mg/dL


 


Calcium     (8.4-10.2)  mg/dL


 


AST     (14-36)  U/L


 


ALT     (4-34)  U/L


 


Total Protein     (6.3-8.2)  g/dL


 


Albumin     (3.5-5.0)  g/dL














  05/31/22 05/31/22 05/31/22 Range/Units





  06:25 06:25 06:33 


 


WBC  11.0 H    (3.8-10.6)  k/uL


 


RBC  3.11 L    (3.80-5.40)  m/uL


 


Hgb  9.0 L    (11.4-16.0)  gm/dL


 


Hct  28.2 L    (34.0-46.0)  %


 


Sodium   133 L   (137-145)  mmol/L


 


BUN   22 H   (7-17)  mg/dL


 


Glucose   101 H   (74-99)  mg/dL


 


POC Glucose (mg/dL)    106 H  (75-99)  mg/dL


 


Calcium   8.0 L   (8.4-10.2)  mg/dL


 


AST   67 H   (14-36)  U/L


 


ALT   66 H   (4-34)  U/L


 


Total Protein   5.6 L   (6.3-8.2)  g/dL


 


Albumin   3.3 L   (3.5-5.0)  g/dL














Assessment and Plan


Plan: 


 Assessment:





#1.  Severe mitral regurgitation, status post mitral valve annuloplasty, 

postoperative day #4.  Patient remains hemodynamically stable, in sinus 

mechanism.





#2.  New onset atrial fibrillation expected outcome of surgery, has converted to

normal sinus rhythm on a combination of oral amiodarone and metoprolol.  No 

anticoagulants for now.





#3.  Status post thoracotomy, patient was successfully weaned and extubated.  

Chest x-ray showing triple removal of the right pleural and mediastinal chest 

tubes.  No evidence of pneumothorax





#4.  Hypothyroidism





#5.  Hypertension





#6.  Colonic polyps





#7.  Chronic anxiety





#8.  Hyponatremia, improved





Plan:





Continue encouraging deep breathing and coughing


Chest x-ray showing no evidence of pneumothorax and elevation of the right 

hemidiaphragm with minimal residual atelectasis and pleural effusion


Hemodynamically she stable, no arrhythmias


Continue antiarrhythmic and the rate control medications per CT surgery


GI and DVT prophylaxis


Continue encouraging ambulation and deep breathing and coughing


We'll continue to follow





I have personally seen and examined the patient, performed the documentation and

the assessment and  plan as written.  Number of minutes spent on the visit: [15]

 





Time with Patient: Less than 30

## 2022-06-01 VITALS — SYSTOLIC BLOOD PRESSURE: 151 MMHG | DIASTOLIC BLOOD PRESSURE: 64 MMHG | RESPIRATION RATE: 20 BRPM | TEMPERATURE: 98 F

## 2022-06-01 VITALS — HEART RATE: 62 BPM

## 2022-06-01 LAB
ANION GAP SERPL CALC-SCNC: 10 MMOL/L
BUN SERPL-SCNC: 18 MG/DL (ref 7–17)
CALCIUM SPEC-MCNC: 8.2 MG/DL (ref 8.4–10.2)
CHLORIDE SERPL-SCNC: 99 MMOL/L (ref 98–107)
CO2 SERPL-SCNC: 25 MMOL/L (ref 22–30)
ERYTHROCYTE [DISTWIDTH] IN BLOOD BY AUTOMATED COUNT: 2.99 M/UL (ref 3.8–5.4)
ERYTHROCYTE [DISTWIDTH] IN BLOOD: 14 % (ref 11.5–15.5)
GLUCOSE BLD-MCNC: 107 MG/DL (ref 75–99)
GLUCOSE SERPL-MCNC: 103 MG/DL (ref 74–99)
HCT VFR BLD AUTO: 26.9 % (ref 34–46)
HGB BLD-MCNC: 8.7 GM/DL (ref 11.4–16)
MAGNESIUM SPEC-SCNC: 2.1 MG/DL (ref 1.6–2.3)
MCH RBC QN AUTO: 29.2 PG (ref 25–35)
MCHC RBC AUTO-ENTMCNC: 32.6 G/DL (ref 31–37)
MCV RBC AUTO: 89.7 FL (ref 80–100)
PLATELET # BLD AUTO: 362 K/UL (ref 150–450)
POTASSIUM SERPL-SCNC: 4.2 MMOL/L (ref 3.5–5.1)
SODIUM SERPL-SCNC: 134 MMOL/L (ref 137–145)
WBC # BLD AUTO: 11.2 K/UL (ref 3.8–10.6)

## 2022-06-01 RX ADMIN — HEPARIN SODIUM SCH UNIT: 5000 INJECTION INTRAVENOUS; SUBCUTANEOUS at 08:43

## 2022-06-01 RX ADMIN — ATORVASTATIN CALCIUM SCH MG: 40 TABLET, FILM COATED ORAL at 08:45

## 2022-06-01 RX ADMIN — CLOPIDOGREL BISULFATE SCH MG: 75 TABLET ORAL at 08:45

## 2022-06-01 RX ADMIN — IPRATROPIUM BROMIDE AND ALBUTEROL SULFATE SCH ML: .5; 3 SOLUTION RESPIRATORY (INHALATION) at 07:58

## 2022-06-01 RX ADMIN — INSULIN ASPART SCH: 100 INJECTION, SOLUTION INTRAVENOUS; SUBCUTANEOUS at 06:37

## 2022-06-01 RX ADMIN — SERTRALINE HYDROCHLORIDE SCH MG: 50 TABLET, FILM COATED ORAL at 08:44

## 2022-06-01 RX ADMIN — ASPIRIN 325 MG ORAL TABLET SCH MG: 325 PILL ORAL at 08:43

## 2022-06-01 RX ADMIN — SERTRALINE HYDROCHLORIDE SCH MG: 50 TABLET, FILM COATED ORAL at 08:43

## 2022-06-01 RX ADMIN — AMIODARONE HYDROCHLORIDE SCH MG: 200 TABLET ORAL at 08:45

## 2022-06-01 RX ADMIN — SODIUM CHLORIDE, PRESERVATIVE FREE SCH ML: 5 INJECTION INTRAVENOUS at 08:50

## 2022-06-01 RX ADMIN — METOPROLOL TARTRATE SCH MG: 25 TABLET, FILM COATED ORAL at 08:45

## 2022-06-01 RX ADMIN — PANTOPRAZOLE SODIUM SCH MG: 40 TABLET, DELAYED RELEASE ORAL at 06:38

## 2022-06-01 NOTE — PN
PROGRESS NOTE



PULMONARY/CRITICAL CARE PROGRESS NOTE:



DATE OF SERVICE:

06/01/2022



This is a 62-year-old female who is postoperative day number 5, status post 
mitral

valve repair secondary to severe mitral regurgitation.  The patient had new-
onset

atrial fibrillation as well.  The patient is doing relatively well and likely 
will be

discharged sometime today.  The patient will continue on incentive spirometer.  
The

patient's chest x-ray is reviewed.  She does have some basilar atelectasis. Labs
are

also reviewed.  Currently white count is 11.2, hemoglobin 8.7, hematocrit 26.9, 
and

platelet count 362,000.  Sodium 134, potassium 4.2, chloride 99. CO2 is 25. BUN 
18 and

creatinine 0.79.  Calcium is 8.2.  Again chest x-ray is reviewed.



PHYSICAL EXAMINATION:

VITAL SIGNS: Vital signs include temperature 98 degrees, heart rate 75, 
respiratory

rate 20, blood pressure 151/64 with a mean of 93, and room-air saturation 98%.

GENERAL APPEARANCE: She appears in no acute distress.  She is sitting in the 
chair next

to the bed.  She is a bit anxious.

HEENT: Examination is grossly unremarkable.

NECK:  Supple. Full range of motion.  No adenopathy.  Neck veins are flat.

CARDIOVASCULAR: Examination reveals regular rhythm and rate.  Heart rate in the 
high

60s.  S1, S2 normal.

LUNGS: Relatively clear.  Breath sounds equal.  Minimal scattered rhonchi.  No 
wheezes.

ABDOMEN:  Soft. Bowel sounds are heard.

EXTREMITIES: Intact.  No cyanosis, clubbing or edema.

SKIN: Without rash.

NEUROLOGIC: Examination is brief but nonfocal.



ASSESSMENT:

1. Severe mitral regurgitation, status post mitral valve annuloplasty, 
postoperative

    day number 5.

2. New-onset atrial fibrillation.

3. Status post thoracotomy, recovered.

4. Hypothyroidism.

5. Hypertension.

6. Colonic polyps.

7. Chronic anxiety.





PLAN:

The patient is doing well.  The patient will likely be discharged home in the 
next 24

hours.  We recommend deep breathing, coughing and clearing secretions.  We also

recommend ongoing use of the incentive spirometer.  The patient will follow up 
in the

office with one of us.  No additional recommendations are made at this time.





MMODL / IJN: [id number] / Job#: [id number]

MTDD

## 2022-06-01 NOTE — XR
EXAMINATION TYPE: XR chest 2V

 

DATE OF EXAM: 6/1/2022

 

COMPARISON: Chest x-ray 5/31/2022

 

HISTORY: Post cardiac surgery

 

TECHNIQUE:  Frontal and lateral views of the chest are obtained.

 

FINDINGS:  Patient is post median sternotomy and left atrial appendage clip placement. Overlying ancelmo
facts are present. Right hemidiaphragm remains elevated. Aorta is dense. Heart size is stable. No radha
dent pneumothorax or sizable pleural effusion. Patchy density noted at the right lung base.

 

IMPRESSION:  Probable basilar atelectasis, difficult to exclude small effusion.
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
## 2022-06-01 NOTE — P.PN
Subjective


Progress Note Date: 22


Principal diagnosis: 





Severe mitral regurgitation with dilated mitral annulus.  Previous medical 

history of hypertension, hypothyroid, remote history of pneumonia, occasional 

EtOH use, never smoker, family history of premature coronary artery disease-

father with first MI at 42 years old,  from MI at 55 years old.  Vaccinated 

against Covid.  Preoperative nasal swab positive for MSSA





POD #5 mitral valve repair with a #28 mm [employer] AnnuloFlex band, clip 

ligation of the left atrial appendage with a 35 mm AtriClip and intraoperative 

transesophageal echocardiogram





Postoperative acute blood loss anemia, expected given hemodilution and 

cardiopulmonary bypass pump





Paroxysmal atrial fibrillation, known common occurrence after open heart surgery





The patient was seen and examined sitting up in a recliner this morning on the 

cardiac stepdown unit in no acute distress eating breakfast.  She remains in 

sinus rhythm with heart rate in the low 60s, no further atrial fibrillation 

noted, denies shortness of breath. Hemodynamically stable. This morning she 

complains of feeling her heart beating fast and feeling a little jittery. She 

has ambulate in the hallway multiple times and received first postoperative 

shower yesterday.  Currently on room air with oxygen saturation in the mid 90s. 

Hoping to go home today.





Objective





- Vital Signs


Vital signs: 


                                   Vital Signs











Temp  98.3 F   22 04:00


 


Pulse  75   22 08:09


 


Resp  18   22 04:00


 


BP  144/71   22 04:00


 


Pulse Ox  95   22 07:58


 


FiO2  50   22 15:24








                                 Intake & Output











 22





 18:59 06:59 18:59


 


Intake Total 240 240 


 


Output Total 1550 1300 


 


Balance -1310 -1060 


 


Weight 94.7 kg 93.5 kg 


 


Intake:   


 


  Intake, IV Titration 0  





  Amount   


 


    Calcium Gluconate in NaCl 0  





    2 gm In Saline 1 100ml.   





    bag @ 100 mls/hr IVPB   





    ONCE PRN Rx#:[id number]   


 


  Oral 240 240 


 


Output:   


 


  Urine 1550 1300 


 


Other:   


 


  Voiding Method Toilet  


 


  # Voids 1  








                       ABP, PAP, CO, CI - Last Documented











Arterial Blood Pressure        141/75


 


Pulmonary Artery Pressure      28/11


 


Cardiac Output                 4.2


 


Cardiac Index                  2.1

















- Exam





CONSTITUTIONAL: Appears comfortable, cooperative, no acute distress


RESPIRATORY:  Lungs sounds diminished bilaterally.  Respirations even, 

nonlabored.  Currently on room air with oxygen saturation 96%.  Able to achieve 

1250 mL on incentive spirometry.  Strong nonproductive cough.  


CARDIOVASCULAR:  S1, S2 present.  Regular rate and rhythm, sinus rhythm on 

telemetry.  Sternum stable.   Palpable peripheral pulses bilaterally.  Trace 

bilateral lower extremity edema present.  No calf pain or tenderness noted.  

Heart hugger in place with patient demonstrating appropriate use.  Antiembolism 

stockings, SCDs present.


GASTROINTESTINAL:  Abdomen soft, nontender, nondistended.  Active bowel sounds 

present 4 quadrants.  Tolerating diet.  Positive bowel movement 


GENITOURINARY:  Continues to void, urine output 2850 mL in the last 24 hours


INTEGUMENTARY:  Skin is warm and dry with evidence of good perfusion.  Anterior 

chest incision well approximated 


NEUROLOGIC:  Cranial nerves II through XII intact


MUSKULOSKELETAL:  Able to move all extremities, strength equal bilaterally, 

ambulatory without difficulty


PSYCHIATRIC:  Alert and oriented to person place and time, appropriate affect, 

intact judgment and insight








- Allied health notes


Allied health notes reviewed: nursing





- Labs


CBC & Chem 7: 


                                 22 07:10





                                 22 07:10


Labs: 


                  Abnormal Lab Results - Last 24 Hours (Table)











  22 Range/Units





  11:21 16:35 20:18 


 


WBC     (3.8-10.6)  k/uL


 


RBC     (3.80-5.40)  m/uL


 


Hgb     (11.4-16.0)  gm/dL


 


Hct     (34.0-46.0)  %


 


Sodium     (137-145)  mmol/L


 


BUN     (7-17)  mg/dL


 


Glucose     (74-99)  mg/dL


 


POC Glucose (mg/dL)  117 H  100 H  140 H  (75-99)  mg/dL


 


Calcium     (8.4-10.2)  mg/dL














  22 Range/Units





  06:26 07:10 07:10 


 


WBC   11.2 H   (3.8-10.6)  k/uL


 


RBC   2.99 L   (3.80-5.40)  m/uL


 


Hgb   8.7 L   (11.4-16.0)  gm/dL


 


Hct   26.9 L   (34.0-46.0)  %


 


Sodium    134 L  (137-145)  mmol/L


 


BUN    18 H  (7-17)  mg/dL


 


Glucose    103 H  (74-99)  mg/dL


 


POC Glucose (mg/dL)  107 H    (75-99)  mg/dL


 


Calcium    8.2 L  (8.4-10.2)  mg/dL














- Imaging and Cardiology


Chest x-ray: report reviewed, image reviewed





Assessment and Plan


Assessment: 





1.  Severe mitral regurgitation with dilated mitral annulus, status post mitral 

valve repair


2.  Hypertension


3.  History of hypothyroid, currently hyperthyroid with TSH 0.036 on 2022,

0.062 with FT4 1.49 on 2022, has been off Synthroid per her primary since 




4.  Remote history of pneumonia


5.  Occasional EtOH use, no history of withdrawal


6.  Never smoker, preoperative FEV1 111% of predicted


7.  Family history of premature coronary artery disease-father with first MI at 

42 years old,  from MI at 55 years old


8.  Vaccinated against Covid


9.  Preoperative nasal swab positive for MSSA, treated with mupirocin


10.  Postoperative acute blood loss anemia, expected 


11.  Paroxysmal atrial fibrillation, known common occurrence after open heart 

surgery, status post ligation of the left atrial appendage


Plan: 





1.  Continue aspirin, statin, beta blocker therapy.  Will initiate low-dose ARB 

for afterload reduction


2.  Continue amiodarone for A. fib prophylaxis.  No anticoagulation 


3.  Encourage incentive spirometry is 10 times every hour while awake.  

Bronchodilators per pulmonology


4.  Increase activity, ambulate as tolerated.  PT/OT/cardiac rehab following


5.  Will monitor daily labs and x-rays.  Electrolyte replacement per protocol. 


6.  GI/DVT prophylaxis


7.  Pain control with current medication regimen


8.  Insulin management per primary care service.  Patient is not diabetic, 

hemoglobin A1c 5.7%


9.  Strict accurate intake and output.  Daily weights


10.  Synthroid management per primary care


11.  Discharge planning in progress, likely will discharge later this afternoon 


12.  More recommendations to follow as patient progresses

## 2022-11-07 ENCOUNTER — HOSPITAL ENCOUNTER (EMERGENCY)
Dept: HOSPITAL 47 - EC | Age: 62
Discharge: HOME | End: 2022-11-07
Payer: MEDICAID

## 2022-11-07 VITALS — RESPIRATION RATE: 19 BRPM | HEART RATE: 59 BPM | SYSTOLIC BLOOD PRESSURE: 140 MMHG | DIASTOLIC BLOOD PRESSURE: 81 MMHG

## 2022-11-07 VITALS — TEMPERATURE: 97.9 F

## 2022-11-07 DIAGNOSIS — U07.1: Primary | ICD-10-CM

## 2022-11-07 DIAGNOSIS — K21.9: ICD-10-CM

## 2022-11-07 DIAGNOSIS — Z79.899: ICD-10-CM

## 2022-11-07 DIAGNOSIS — Z79.890: ICD-10-CM

## 2022-11-07 DIAGNOSIS — Z88.5: ICD-10-CM

## 2022-11-07 DIAGNOSIS — E03.9: ICD-10-CM

## 2022-11-07 DIAGNOSIS — Z88.8: ICD-10-CM

## 2022-11-07 DIAGNOSIS — Z79.83: ICD-10-CM

## 2022-11-07 LAB
ALBUMIN SERPL-MCNC: 4.2 G/DL (ref 3.5–5)
ALP SERPL-CCNC: 89 U/L (ref 38–126)
ALT SERPL-CCNC: 22 U/L (ref 4–34)
ANION GAP SERPL CALC-SCNC: 8 MMOL/L
APTT BLD: 22.5 SEC (ref 22–30)
AST SERPL-CCNC: 23 U/L (ref 14–36)
BASOPHILS # BLD AUTO: 0 K/UL (ref 0–0.2)
BASOPHILS NFR BLD AUTO: 1 %
BUN SERPL-SCNC: 14 MG/DL (ref 7–17)
CALCIUM SPEC-MCNC: 8.7 MG/DL (ref 8.4–10.2)
CHLORIDE SERPL-SCNC: 106 MMOL/L (ref 98–107)
CO2 SERPL-SCNC: 22 MMOL/L (ref 22–30)
EOSINOPHIL # BLD AUTO: 0.2 K/UL (ref 0–0.7)
EOSINOPHIL NFR BLD AUTO: 3 %
ERYTHROCYTE [DISTWIDTH] IN BLOOD BY AUTOMATED COUNT: 4.48 M/UL (ref 3.8–5.4)
ERYTHROCYTE [DISTWIDTH] IN BLOOD: 14.7 % (ref 11.5–15.5)
GLUCOSE SERPL-MCNC: 106 MG/DL (ref 74–99)
HCT VFR BLD AUTO: 37.5 % (ref 34–46)
HGB BLD-MCNC: 12.6 GM/DL (ref 11.4–16)
INR PPP: 0.9 (ref ?–1.2)
LYMPHOCYTES # SPEC AUTO: 1.3 K/UL (ref 1–4.8)
LYMPHOCYTES NFR SPEC AUTO: 24 %
MAGNESIUM SPEC-SCNC: 1.9 MG/DL (ref 1.6–2.3)
MCH RBC QN AUTO: 28.2 PG (ref 25–35)
MCHC RBC AUTO-ENTMCNC: 33.7 G/DL (ref 31–37)
MCV RBC AUTO: 83.7 FL (ref 80–100)
MONOCYTES # BLD AUTO: 0.3 K/UL (ref 0–1)
MONOCYTES NFR BLD AUTO: 5 %
NEUTROPHILS # BLD AUTO: 3.5 K/UL (ref 1.3–7.7)
NEUTROPHILS NFR BLD AUTO: 65 %
PLATELET # BLD AUTO: 326 K/UL (ref 150–450)
POTASSIUM SERPL-SCNC: 4.5 MMOL/L (ref 3.5–5.1)
PROT SERPL-MCNC: 6.8 G/DL (ref 6.3–8.2)
PT BLD: 10.1 SEC (ref 9–12)
SODIUM SERPL-SCNC: 136 MMOL/L (ref 137–145)
WBC # BLD AUTO: 5.5 K/UL (ref 3.8–10.6)

## 2022-11-07 PROCEDURE — 85610 PROTHROMBIN TIME: CPT

## 2022-11-07 PROCEDURE — 83735 ASSAY OF MAGNESIUM: CPT

## 2022-11-07 PROCEDURE — 85025 COMPLETE CBC W/AUTO DIFF WBC: CPT

## 2022-11-07 PROCEDURE — 80053 COMPREHEN METABOLIC PANEL: CPT

## 2022-11-07 PROCEDURE — 71046 X-RAY EXAM CHEST 2 VIEWS: CPT

## 2022-11-07 PROCEDURE — 99285 EMERGENCY DEPT VISIT HI MDM: CPT

## 2022-11-07 PROCEDURE — 96374 THER/PROPH/DIAG INJ IV PUSH: CPT

## 2022-11-07 PROCEDURE — 85379 FIBRIN DEGRADATION QUANT: CPT

## 2022-11-07 PROCEDURE — 93005 ELECTROCARDIOGRAM TRACING: CPT

## 2022-11-07 PROCEDURE — 84484 ASSAY OF TROPONIN QUANT: CPT

## 2022-11-07 PROCEDURE — 36415 COLL VENOUS BLD VENIPUNCTURE: CPT

## 2022-11-07 PROCEDURE — 96361 HYDRATE IV INFUSION ADD-ON: CPT

## 2022-11-07 PROCEDURE — 85730 THROMBOPLASTIN TIME PARTIAL: CPT

## 2022-11-07 NOTE — XR
EXAMINATION TYPE: XR chest 2V

 

DATE OF EXAM: 11/7/2022

 

COMPARISON: 6/15/2022

 

HISTORY: Chest pain

 

TECHNIQUE: 2 views

 

FINDINGS: There is no heart failure nor confluent pneumonic infiltrate. Costophrenic angles are clear
. There are sternal wires. There are chest leads.

 

IMPRESSION: No active cardiopulmonary disease. No adverse change.

## 2022-11-07 NOTE — ED
Chest Pain HPI





- General


Chief Complaint: Upper Respiratory Infection


Stated Complaint: Chest Heaviness, Shortness of breath, COVID+


Time Seen by Provider: 22 01:30


Source: patient, RN notes reviewed


Mode of arrival: ambulatory


Limitations: no limitations





- History of Present Illness


Initial Comments: 


This is a 62-year-old female who presents to the emergency department for 

anxiety and palpitations.  3 days ago she tested positive for COVID-19 at home. 

Over the last 1-2 days, she has had feelings of anxiety and palpitations.  

States that she is unable to sleep at night due to these palpitations, and she 

had surgery in May of this year for mitral valve repair due to severe mitral 

regurgitation.  This history does make her feel much more nervous.  Also reports

nausea and diarrhea from COVID.





Denies any fevers, chills, sore throat, abdominal pain, vomiting, back pain, or 

headaches.





MD Complaint: chest pain, other (palpitations)


Onset/Timin


-: days(s)





- Related Data


                                Home Medications











 Medication  Instructions  Recorded  Confirmed


 


Ibuprofen [Advil] 200 - 400 mg PO Q6HR PRN 04/10/19 05/24/22


 


ALPRAZolam [Xanax] 0.25 mg PO HS PRN 20


 


Acetaminophen [Tylenol Arthritis] 650 mg PO Q6H PRN 22








                                  Previous Rx's











 Medication  Instructions  Recorded


 


Amiodarone [Cordarone] 200 mg PO BID #21 tab 22


 


Aspirin 325 mg PO DAILY #30 tab 22


 


Atorvastatin [Lipitor] 40 mg PO DAILY #30 tab 22


 


Clopidogrel [Plavix] 75 mg PO DAILY #30 tab 22


 


Furosemide [Lasix] 40 mg PO DAILY #7 tablet 22


 


Levothyroxine Sodium [Synthroid] 75 mcg PO DAILY@0630 #30 tab 22


 


Losartan [Cozaar] 12.5 mg PO DAILY #30 tab 22


 


Metoprolol Tartrate [Lopressor] 25 mg PO BID #60 tab 22


 


Pantoprazole [Protonix] 40 mg PO AC-BRKFST #30 tab 22


 


Sennosides-Docusate Sodium 2 each PO HS PRN  tab 22





[Senokot-S]  


 


traMADol HCL [Ultram] 50 mg PO Q6HR PRN 3 Days #12 tab 22


 


Furosemide [Lasix] 20 mg PO DAILY #5 tab 22


 


Ondansetron Odt [Zofran Odt] 4 mg PO Q8HR PRN #15 tab 22











                                    Allergies











Allergy/AdvReac Type Severity Reaction Status Date / Time


 


cortisone Allergy  Rash/Hives Verified 22 01:29


 


hyaluronic acid Allergy  Itching Verified 22 01:29





[From Hyalgan]   @injection  





   site  


 


hydrocodone AdvReac  SHORTNESS Verified 22 01:29





   OF BREATHE  


 


propoxyphene napsylate AdvReac  SHORTNESS Verified 22 01:29





[From Darvocet-N]   OF BREATHE  














Review of Systems


ROS Statement: 


Those systems with pertinent positive or pertinent negative responses have been 

documented in the HPI.





ROS Other: All systems not noted in ROS Statement are negative.





EKG Findings





- EKG Comments:


EKG Findings:: Sinus rhythm.  Ventricular rate 60 bpm, DC interval 169 ms, QRS 

duration 145 ms,  ms.





Past Medical History


Past Medical History: GERD/Reflux, Mitral Valve Prolapse (MVP), Pneumonia, 

Thyroid Disorder


Additional Past Medical History / Comment(s): Mitral valve prolapse, hx. colon 

polyps,  bowel habits, intermittent RLQ pain, SOB late afternoon & evening. See 

Dr. Menjivar's H&P.


History of Any Multi-Drug Resistant Organisms: None Reported


Past Surgical History: Appendectomy, Tubal Ligation


Additional Past Surgical History / Comment(s): Mitral valve repair


Past Anesthesia/Blood Transfusion Reactions: No Reported Reaction, Family 

History of Problems w/ Anesthesia


Additional Past Anesthesia/Blood Transfusion Reaction / Comment(s): 16 y.o. son 

had some chest pain that was attributed to anesthesia per pt.


Past Psychological History: No Psychological Hx Reported, Anxiety


Smoking Status: Never smoker


Past Alcohol Use History: Occasional


Past Drug Use History: None Reported





- Past Family History


  ** Mother Sister(s)


Family Medical History: Cancer


Additional Family Medical History / Comment(s): mom colon, sister brain & breast

cancer





General Exam


Limitations: no limitations


General appearance: alert, in no apparent distress


Head exam: Present: atraumatic, normocephalic, normal inspection


Respiratory exam: Present: normal lung sounds bilaterally.  Absent: respiratory 

distress, wheezes, rales, rhonchi, stridor


Cardiovascular Exam: Present: regular rate, normal rhythm, normal heart sounds. 

Absent: systolic murmur, diastolic murmur, rubs, gallop, clicks


Neurological exam: Present: alert, oriented X3, CN II-XII intact


Psychiatric exam: Present: normal affect, normal mood


Skin exam: Present: warm, dry, intact, normal color.  Absent: rash





Course


                                   Vital Signs











  22





  01:26 03:06


 


Temperature 97.9 F 


 


Pulse Rate 60 59 L


 


Respiratory 18 19





Rate  


 


Blood Pressure 198/77 140/81


 


O2 Sat by Pulse 98 96





Oximetry  














Chest Pain MDM





- MDM


This is a 62-year-old female who presents to the emergency department for 

palpitations. Chest x-ray obtained and my interpretation does not identify any 

infiltrates or consolidations. She was given IV fluids and Zofran for her 

symptoms.  Lab work was nonactionable including a negative troponin and negative

d-dimer.  Findings discussed with the patient.  States that she does feel much 

better in terms of anxiety and palpitations knowing that her lab work, including

cardiac markers, did not reveal any irregularities.  Believes that her symptoms 

are largely from anxiety related to her heart and the prior surgery.  

Prescription and starter pack for Zofran provided.  Also suggested 

over-the-counter Imodium for the diarrhea if it becomes too bothersome.  Advised

she remain well-hydrated and continue to get plenty of rest.  Reminded her to 

quarantine for 5 days, starting from the day she tested positive, and to 

practice extra precautions for an additional 5 days, including always wearing a 

mask around others and avoiding travel.  Instructed her to follow-up with her 

primary care provider and cardiologist after her quarantine period is finished.





Return precautions reviewed in depth, the patient is instructed to return to the

emergency department with any new, worsening, or concerning symptoms. Patient 

verbalized understanding. 





This case was discussed in detail with the attending ED physician. Presentation,

findings, and treatment plan discussed in detail as well. 








Disposition


Clinical Impression: 


 COVID-19





Disposition: HOME SELF-CARE


Instructions (If sedation given, give patient instructions):  COVID-19 

(Coronavirus Disease 2019) (ED), How to Recover from COVID-19 at Home (ED)


Additional Instructions: 


Return to the emergency department with any new, worsening, or concerning 

symptoms.  Take the Zofran up to every 8 hours as needed for nausea and 

vomiting.  Make sure that you remain well-hydrated and continue to get plenty of

rest.  Make sure that you quarantine for 5 days starting from the day you tested

positive, and practice extra precautions for an additional 5 days afterwards, 

including always wearing a mask around others and avoiding travel.  Follow up 

with your primary care provider and cardiologist after your quarantine period 

ends. 


Prescriptions: 


Ondansetron Odt [Zofran Odt] 4 mg PO Q8HR PRN #15 tab


 PRN Reason: Nausea And Vomiting


Is patient prescribed a controlled substance at d/c from ED?: No


Referrals: 


Shahla Pedro MD [Primary Care Provider] - 1-2 days

## 2023-03-16 ENCOUNTER — HOSPITAL ENCOUNTER (OUTPATIENT)
Dept: HOSPITAL 47 - RADCTMAIN | Age: 63
Discharge: HOME | End: 2023-03-16
Attending: INTERNAL MEDICINE
Payer: MEDICAID

## 2023-03-16 DIAGNOSIS — R06.02: ICD-10-CM

## 2023-03-16 DIAGNOSIS — I51.7: Primary | ICD-10-CM

## 2023-03-16 DIAGNOSIS — J98.4: ICD-10-CM

## 2023-03-16 PROCEDURE — 71275 CT ANGIOGRAPHY CHEST: CPT

## 2023-03-16 NOTE — CT
EXAMINATION TYPE: CT angio chest

CT DLP: 476.2 mGycm, Automated exposure control for dose reduction was used.

 

DATE OF EXAM: 3/16/2023 5:14 PM

 

COMPARISON: Chest radiograph 11/7/2022.

 

CLINICAL INDICATION:Female, 63 years old with history of R06.02 Short of breath; Blood work showed el
evated d-dimer, shortness of breath, left knee surgery x 2 months ago

 

TECHNIQUE/CONTRAST: 

CTA scan of the thorax is performed with IV Contrast, patient injected with 90 cc mL of Isovue 370, p
ulmonary embolism protocol.  MIP images are created and reviewed these are created on a separate work
station..  

 

FINDINGS: 

 

Pulmonary Artery: There is no evidence for a filling defect within the pulmonary vasculature to sugge
st acute pulmonary embolism.  The pulmonary artery is of normal size. 

Lungs/Pleura: Mild intralobular septal thickening. No evidence of focal consolidation, pleural effusi
on or pneumothorax. 

Airway: Large airways are patent.

Heart: Heart is mildly enlarged for size. Is mitral valve annular cusp patient's. Post atrial appenda
ge occlusion device changes.

Vasculature: Mild atherosclerotic calcifications are present throughout the aorta and its branches.

Mediastinum: No gross evidence of adenopathy.

Musculoskeletal: No acute osseous abnormalities, sternotomy wires are present. Mild multilevel disc d
egeneration changes throughout the spine.

Soft Tissues: Unremarkable.

Lower neck: No significant findings.

Upper Abdomen: No significant findings.

 

IMPRESSION:

1.  No evidence of pulmonary embolism.

2.  Mild cardiomegaly with mild intralobular septal thickening correlate with serum BNP.

## 2023-10-03 ENCOUNTER — HOSPITAL ENCOUNTER (OUTPATIENT)
Dept: HOSPITAL 47 - RADMAMWWP | Age: 63
Discharge: HOME | End: 2023-10-03
Attending: OBSTETRICS & GYNECOLOGY
Payer: MEDICARE

## 2023-10-03 DIAGNOSIS — Z12.31: Primary | ICD-10-CM

## 2023-10-03 DIAGNOSIS — Z78.0: ICD-10-CM

## 2023-10-03 DIAGNOSIS — Z80.3: ICD-10-CM

## 2023-10-03 PROCEDURE — 77067 SCR MAMMO BI INCL CAD: CPT

## 2023-10-04 NOTE — MM
Reason for Exam: Screening  (asymptomatic). 

Last mammogram was performed 1 year(s) and 7 month(s) ago. 





Patient History: 

Menarche at age 14. First Full-Term Pregnancy at age 24. Right ovary removed at age 48.

Postmenopausal.

Sister had breast cancer, age 48. 





Risk Values: 

Rachel 5 year model risk: 3.0%.

NCI Lifetime model risk: 11.0%.





Prior Study Comparison: 

3/15/2012 Bilateral Screening Mammogram, [facility]. 6/15/2018 Bilateral Screening Mammogram, [facility].

3/25/2022 Bilateral Screening Mammogram, [facility]. 





Tissue Density: 

There are scattered fibroglandular densities.





Findings: 

Analyzed By CAD. 

There is no suspicious group of microcalcifications or new suspicious mass. 





Overall Assessment: Negative, BI-RAD 1





Management: 

Screening Mammogram of both breasts in 1 year.

[facility] will attempt to contact patient to return for supplemental views and

ultrasound if indicated.



Patient should continue monthly self-breast exams.  A clinical breast exam by your physician is

recommended on an annual basis.

This exam should not preclude additional follow-up of suspicious palpable abnormalities.



Note on Rachel scores and lifetime risk:

1. A Rachel score greater than 3% is considered moderate risk. If this is the case, consider

specialist referral to assess eligibility for a risk reducing agent.

2. If overall lifetime risk for the development of breast cancer is 20% or higher, the patient may

qualify for future screening with alternating mammogram and breast MRI.



Electronically signed and approved by: Joey Carvalho DO

## 2024-09-17 ENCOUNTER — HOSPITAL ENCOUNTER (OUTPATIENT)
Dept: HOSPITAL 47 - LABWHC1 | Age: 64
Discharge: HOME | End: 2024-09-17
Attending: ORTHOPAEDIC SURGERY
Payer: MEDICARE

## 2024-09-17 DIAGNOSIS — M76.70: ICD-10-CM

## 2024-09-17 DIAGNOSIS — M67.88: Primary | ICD-10-CM

## 2024-09-17 LAB
BASOPHILS # BLD AUTO: 0.1 X 10*3/UL (ref 0–0.1)
BASOPHILS NFR BLD AUTO: 1.3 %
CCP IGG SERPL-ACNC: <1.5 U/ML (ref ?–3.9)
EOSINOPHIL # BLD AUTO: 0.37 X 10*3/UL (ref 0.04–0.35)
EOSINOPHIL NFR BLD AUTO: 4.9 %
ERYTHROCYTE [DISTWIDTH] IN BLOOD BY AUTOMATED COUNT: 4.22 X 10*6/UL (ref 4.1–5.2)
ERYTHROCYTE [DISTWIDTH] IN BLOOD: 12.9 % (ref 11.5–14.5)
HCT VFR BLD AUTO: 38.2 % (ref 37.2–46.3)
HGB BLD-MCNC: 12.7 G/DL (ref 12–15)
IMM GRANULOCYTES BLD QL AUTO: 0.4 %
LYMPHOCYTES # SPEC AUTO: 1.94 X 10*3/UL (ref 0.9–5)
LYMPHOCYTES NFR SPEC AUTO: 25.9 %
MCH RBC QN AUTO: 30.1 PG (ref 27–32)
MCHC RBC AUTO-ENTMCNC: 33.2 G/DL (ref 32–37)
MCV RBC AUTO: 90.5 FL (ref 80–97)
MONOCYTES # BLD AUTO: 0.66 X 10*3/UL (ref 0.2–1)
MONOCYTES NFR BLD AUTO: 8.8 %
NEUTROPHILS # BLD AUTO: 4.38 X 10*3/UL (ref 1.8–7.7)
NEUTROPHILS NFR BLD AUTO: 58.7 %
NRBC BLD AUTO-RTO: 0 X 10*3/UL (ref 0–0.01)
PLATELET # BLD AUTO: 329 X 10*3/UL (ref 140–440)
RHEUMATOID FACT SERPL-ACNC: <15 IU/ML (ref 0–15)
URATE SERPL-MCNC: 5.9 MG/DL (ref 2.9–7.7)
WBC # BLD AUTO: 7.48 X 10*3/UL (ref 4.5–10)

## 2024-09-17 PROCEDURE — 86038 ANTINUCLEAR ANTIBODIES: CPT

## 2024-09-17 PROCEDURE — 86200 CCP ANTIBODY: CPT

## 2024-09-17 PROCEDURE — 36415 COLL VENOUS BLD VENIPUNCTURE: CPT

## 2024-09-17 PROCEDURE — 85652 RBC SED RATE AUTOMATED: CPT

## 2024-09-17 PROCEDURE — 85025 COMPLETE CBC W/AUTO DIFF WBC: CPT

## 2024-09-17 PROCEDURE — 86431 RHEUMATOID FACTOR QUANT: CPT

## 2024-09-17 PROCEDURE — 86140 C-REACTIVE PROTEIN: CPT

## 2024-09-17 PROCEDURE — 84550 ASSAY OF BLOOD/URIC ACID: CPT

## 2024-12-06 ENCOUNTER — HOSPITAL ENCOUNTER (OUTPATIENT)
Dept: HOSPITAL 47 - RADMAMWWP | Age: 64
Discharge: HOME | End: 2024-12-06
Attending: FAMILY MEDICINE
Payer: MEDICARE

## 2024-12-06 DIAGNOSIS — Z90.721: ICD-10-CM

## 2024-12-06 DIAGNOSIS — Z78.0: ICD-10-CM

## 2024-12-06 DIAGNOSIS — R92.323: ICD-10-CM

## 2024-12-06 DIAGNOSIS — Z80.3: ICD-10-CM

## 2024-12-06 DIAGNOSIS — Z12.31: Primary | ICD-10-CM

## 2024-12-06 PROCEDURE — 77063 BREAST TOMOSYNTHESIS BI: CPT

## 2024-12-06 PROCEDURE — 77067 SCR MAMMO BI INCL CAD: CPT

## 2024-12-09 NOTE — MM
Reason for Exam: Screening  (asymptomatic). 

Last mammogram was performed 1 year(s) and 2 month(s) ago. 





Patient History: 

Menarche at age 14. First Full-Term Pregnancy at age 24. Right ovary removed at age 48.

Postmenopausal.

Sister had breast cancer, age 48. 





Risk Values: 

Rachel 5 year model risk: 3.0%.

NCI Lifetime model risk: 10.6%.





Prior Study Comparison: 

6/15/2018 Bilateral Screening Mammogram, [facility]. 3/25/2022 Bilateral Screening Mammogram, [facility].

10/3/2023 Bilateral MG screening mammo w CAD, [facility]. 





Tissue Density: 

There are scattered areas of fibroglandular density.





Findings: 

Analyzed By CAD. 

Right breast: There is no suspicious group of microcalcifications or new suspicious mass.



Left breast: There is no suspicious group of microcalcifications or new suspicious mass. 





Overall Assessment: Negative, BI-RAD 1





Management: 

Screening Mammogram of both breasts in 1 year.

[facility] will attempt to contact patient to return for supplemental views and

ultrasound if indicated.



Patient should continue monthly self-breast exams.  A clinical breast exam by your physician is

recommended on an annual basis.

This exam should not preclude additional follow-up of suspicious palpable abnormalities.



Note on Rachel scores and lifetime risk:

1. A Rachel score greater than 3% is considered moderate risk. If this is the case, consider

specialist referral to assess eligibility for a risk reducing agent.

2. If overall lifetime risk for the development of breast cancer is 20% or higher, the patient may

qualify for future screening with alternating mammogram and breast MRI.



[facility], Workstation: [id number], 12/9/2024 12:39 PM.



Electronically signed and approved by: Joey Carvalho DO

## 2024-12-12 ENCOUNTER — HOSPITAL ENCOUNTER (OUTPATIENT)
Dept: HOSPITAL 47 - RADXRMAIN | Age: 64
Discharge: HOME | End: 2024-12-12
Attending: FAMILY MEDICINE
Payer: MEDICARE

## 2024-12-12 DIAGNOSIS — R05.9: Primary | ICD-10-CM

## 2024-12-12 PROCEDURE — 71046 X-RAY EXAM CHEST 2 VIEWS: CPT

## 2024-12-12 NOTE — XR
EXAMINATION TYPE: XR chest 2V

 

DATE OF EXAM: 12/12/2024 8:57 AM

 

COMPARISON: Chest radiographs from 11/7/2022

 

CLINICAL INDICATION: Female, 64 years old with history of R05.9 COUGH, UNSPECIFIED; [facility]

 

TECHNIQUE: XR chest 2V Frontal and lateral views of the chest.

 

FINDINGS: 

Lungs/Pleura: There is no evidence of pleural effusion, focal consolidation, or pneumothorax.  

Pulmonary vascularity: Unremarkable.

Heart/mediastinum: Cardiomediastinal silhouette is unremarkable. Left atrial appendage occlusion giovana
ce is present.

Musculoskeletal: No acute osseous pathology.

 

IMPRESSION: 

No acute cardiopulmonary disease/process.

 

 

 

[facility], Workstation: [id number], 12/12/2024 10:34 AM

## 2025-05-03 NOTE — P.PN
Patient had a 1 year follow up (due around 05/01/2025), with ANAT Lindsay, scheduled for 5/01/2025.    Patient was called on 4/1/25 & 4/18/25, as attempts to reschedule with ANAT Lauren instead, as she is not a CHF patient. Patient did not answer either attempt, & a voicemail was left both times. Patient never returned a call to reschedule.    Canceled: 4/28/25 by Patient Portal   Cancel Rsn: Symptoms got better    Patient declined being seen for a follow up, as her symptoms have improved. No further scheduling attempts will be made.    Documenting as FYI.   Subjective


Progress Note Date: 22


Principal diagnosis: 





Severe mitral regurgitation with dilated mitral annulus.  Previous medical 

history of hypertension, hypothyroid, remote history of pneumonia, occasional 

EtOH use, never smoker, family history of premature coronary artery disease-

father with first MI at 42 years old,  from MI at 55 years old.  Vaccinated 

against Covid.  Preoperative nasal swab positive for MSSA





POD #1 mitral valve repair with a #28 mm [employer] AnnuloFlex band, clip 

ligation of the left atrial appendage with a 35 mm AtriClip and intraoperative 

transesophageal echocardiogram





Postoperative acute blood loss anemia, expected given hemodilution and 

cardiopulmonary bypass pump





Atrial fibrillation, known common occurrence after open heart surgery





The patient was seen and examined sitting up in a recliner this morning in the 

intensive care unit in no acute distress.  She was successfully extubated 

yesterday afternoon at 16:02.  She is currently in sinus rhythm although she did

go into rapid atrial fibrillation this morning which was treated with IV 

amiodarone, denies shortness of breath.  She does complain of postsurgical chest

pain but states it is controlled on current medication regimen.  Hemodynamically

stable currently, she was on Cleviprex for blood pressure until early this 

morning.  Right internal jugular [place]/Cordis, right radial arterial line, 

mediastinal/right pleural chest tubes all remain.





Objective





- Vital Signs


Vital signs: 


                                   Vital Signs











Temp  98.1 F   22 05:00


 


Pulse  96   22 07:00


 


Resp  18   22 07:00


 


BP  138/75   22 01:00


 


Pulse Ox  93 L  22 07:00


 


FiO2  50   22 15:24








                                 Intake & Output











 22





 18:59 06:59 18:59


 


Intake Total 344.053 0002.568 61.727


 


Output Total 2540 1065 15


 


Balance -1692.273 593.568 46.727


 


Weight  96.4 kg 


 


Intake:   


 


   768 59


 


    Lactated Ringers 1,000 ml  550 50





    @ 20 mls/hr IV .Q24H [facility]   





    Rx#:[id number]   


 


    cardiac output 100 110 


 


    pressure bags 54 108 9


 


  Intake, IV Titration 642.727 120.568 2.727





  Amount   


 


    ACETAMINOPHEN IV (For   





    ) 1,000 mg In Empty Bag 1   





    bag @ 400 mls/hr IVPB   





    Q6H [facility]RA Rx#:[id number]   


 


    Clevidipine Butyrate 25 53.600 61.899 





    mg In Empty Bag 1 bag @ 1   





    MG/HR 2 mls/hr IV .Q24H   





    TERRA Rx#:[id number]   


 


    Insulin Regular 100 unit 2.828 8.669 2.727





    In Sodium Chloride 0.9%   





    100 ml @ Per Protocol IV   





    .Q0M [facility]RA Rx#:[id number]   


 


    Lactated Ringers 1,000 ml 300 50 





    @ 20 mls/hr IV .Q24H [facility]   





    Rx#:[id number]   


 


    ceFAZolin 2 gm In Sodium 50  





    Chloride 0.9% 50 ml @ 100   





    mls/hr IVPB Q8HR [facility] Rx#   





    :[id number]   


 


    propofoL 1,000 mg In 36.299  





    Empty Bag 1 bag @ Titrate   





    IV .Q0M [facility]RA Rx#:   





    [id number]   


 


  Oral  770 


 


Output:   


 


  Chest Tube Drainage 150 215 0


 


    mediastinal and right 150 215 0





    pleural   


 


  Urine 1490 850 15


 


  Estimated Blood Loss 900  


 


Other:   


 


  Voiding Method Indwelling Catheter Indwelling Catheter 








                       ABP, PAP, CO, CI - Last Documented











Arterial Blood Pressure        100/51


 


Pulmonary Artery Pressure      31/14


 


Cardiac Output                 5.3


 


Cardiac Index                  2.7

















- Exam





CONSTITUTIONAL: Appears comfortable, cooperative, no acute distress


RESPIRATORY:  Lungs sounds diminished bilaterally.  Respirations even, 

nonlabored.  Currently on 3 L nasal cannula with oxygen saturation 96%.  Able to

achieve 1000 mL on incentive spirometry.  Strong cough.  


CARDIOVASCULAR:  S1, S2 present.  Regular rate and rhythm, sinus rhythm on 

telemetry currently, was in rapid atrial fibrillation this morning.  Sternum 

stable.   Palpable peripheral pulses bilaterally.  Trace bilateral lower 

extremity edema present.  No calf pain or tenderness noted.  Heart hugger in 

place with patient demonstrating appropriate use.  Antiembolism stockings, SCDs 

present.


GASTROINTESTINAL:  Abdomen soft, nontender, nondistended.  Hypoactive bowel 

sounds present 4 quadrants.  Tolerating clear liquid.  Negative flatus


GENITOURINARY:  [place] present draining clear, yellow urine.  Output overnight 

30-60 mL per hour


INTEGUMENTARY:  Skin is warm and dry with evidence of good perfusion.  Anterior 

chest incision well approximated and covered with dry intact dressing


NEUROLOGIC:  Cranial nerves II through XII intact


MUSKULOSKELETAL:  Able to move all extremities, strength equal bilaterally


PSYCHIATRIC:  Alert and oriented to person place and time, appropriate affect, 

intact judgment and insight


INVASIVE LINES AND TUBES:  Mediastinal/right pleural chest tubes present and 

connected to wall suction, no air leaks present, 120 mL serosanguineous drainage

overnight, 520 mL since surgery.  A/V epicardial pacemaker wires present, 

connected to generator, AAI mode with backup rate 50 bpm.  Right internal 

jugular [place]is, right radial arterial line present.  Last CO/CI 5.3/2.7, PA

31/17, CVP 6.





- Allied health notes


Allied health notes reviewed: nursing





- Labs


CBC & Chem 7: 


                                 22 04:15





                                 22 04:15


Labs: 


                  Abnormal Lab Results - Last 24 Hours (Table)











  22 Range/Units





  09:09 08:50 09:40 


 


WBC     (3.8-10.6)  k/uL


 


RBC     (3.80-5.40)  m/uL


 


Hgb     (11.4-16.0)  gm/dL


 


Hct     (34.0-46.0)  %


 


Neutrophils #     (1.3-7.7)  k/uL


 


Lymphocytes #     (1.0-4.8)  k/uL


 


ABG pH     (7.35-7.45)  


 


ABG pCO2     (35-45)  mmHg


 


ABG pO2   243 H  246 H  ()  mmHg


 


ABG Total CO2     (19-24)  mmol/L


 


ABG O2 Saturation   99.6 H  99.6 H  (94-97)  %


 


ABG Hematocrit     (34.0-46.0)  %


 


ABG Potassium     (3.4-4.5)  mmol/L


 


ABG Ionized Calcium     (4.5-5.3)  mg/dL


 


ABG Glucose    104 H  (75-99)  mg/dL


 


Hemoglobin    10.9 L  (11.4-16.0)  gm/dL


 


Sodium     (137-145)  mmol/L


 


Chloride     ()  mmol/L


 


Glucose     (74-99)  mg/dL


 


POC Glucose (mg/dL)     (75-99)  mg/dL


 


Calcium     (8.4-10.2)  mg/dL


 


Magnesium     (1.6-2.3)  mg/dL


 


AST     (14-36)  U/L


 


Total Protein     (6.3-8.2)  g/dL


 


Albumin     (3.5-5.0)  g/dL


 


TSH     (0.465-4.680)  mIU/L


 


Arterial Blood Potassium     (3.4-4.5)  mmol/L


 


Arterial Blood Glucose    104 H  (75-99)  mg/dL


 


Crossmatch  See Detail    














  22 Range/Units





  10:06 10:30 11:27 


 


WBC     (3.8-10.6)  k/uL


 


RBC     (3.80-5.40)  m/uL


 


Hgb     (11.4-16.0)  gm/dL


 


Hct     (34.0-46.0)  %


 


Neutrophils #     (1.3-7.7)  k/uL


 


Lymphocytes #     (1.0-4.8)  k/uL


 


ABG pH   7.31 L   (7.35-7.45)  


 


ABG pCO2     (35-45)  mmHg


 


ABG pO2  410 H  349 H  157 H  ()  mmHg


 


ABG Total CO2     (19-24)  mmol/L


 


ABG O2 Saturation  100.0 H  99.8 H  99.2 H  (94-97)  %


 


ABG Hematocrit  25 L  26 L  31 L  (34.0-46.0)  %


 


ABG Potassium  5.4 H  5.2 H  4.9 H  (3.4-4.5)  mmol/L


 


ABG Ionized Calcium  3.8 L  4.1 L   (4.5-5.3)  mg/dL


 


ABG Glucose  104 H  103 H  121 H  (75-99)  mg/dL


 


Hemoglobin  8.2 L  8.3 L  9.9 L  (11.4-16.0)  gm/dL


 


Sodium     (137-145)  mmol/L


 


Chloride     ()  mmol/L


 


Glucose     (74-99)  mg/dL


 


POC Glucose (mg/dL)     (75-99)  mg/dL


 


Calcium     (8.4-10.2)  mg/dL


 


Magnesium     (1.6-2.3)  mg/dL


 


AST     (14-36)  U/L


 


Total Protein     (6.3-8.2)  g/dL


 


Albumin     (3.5-5.0)  g/dL


 


TSH     (0.465-4.680)  mIU/L


 


Arterial Blood Potassium  5.4 H  5.2 H  4.9 H  (3.4-4.5)  mmol/L


 


Arterial Blood Glucose  104 H  103 H  121 H  (75-99)  mg/dL


 


Crossmatch     














  0522 Range/Units





  12:15 12:15 12:18 


 


WBC  18.8 H    (3.8-10.6)  k/uL


 


RBC  3.79 L    (3.80-5.40)  m/uL


 


Hgb  10.7 L    (11.4-16.0)  gm/dL


 


Hct  33.9 L    (34.0-46.0)  %


 


Neutrophils #  15.9 H    (1.3-7.7)  k/uL


 


Lymphocytes #     (1.0-4.8)  k/uL


 


ABG pH     (7.35-7.45)  


 


ABG pCO2     (35-45)  mmHg


 


ABG pO2     ()  mmHg


 


ABG Total CO2     (19-24)  mmol/L


 


ABG O2 Saturation     (94-97)  %


 


ABG Hematocrit     (34.0-46.0)  %


 


ABG Potassium     (3.4-4.5)  mmol/L


 


ABG Ionized Calcium     (4.5-5.3)  mg/dL


 


ABG Glucose     (75-99)  mg/dL


 


Hemoglobin     (11.4-16.0)  gm/dL


 


Sodium     (137-145)  mmol/L


 


Chloride   109 H   ()  mmol/L


 


Glucose   112 H   (74-99)  mg/dL


 


POC Glucose (mg/dL)    117 H  (75-99)  mg/dL


 


Calcium   8.2 L   (8.4-10.2)  mg/dL


 


Magnesium   2.8 H   (1.6-2.3)  mg/dL


 


AST   43 H   (14-36)  U/L


 


Total Protein   5.0 L   (6.3-8.2)  g/dL


 


Albumin   2.9 L   (3.5-5.0)  g/dL


 


TSH     (0.465-4.680)  mIU/L


 


Arterial Blood Potassium     (3.4-4.5)  mmol/L


 


Arterial Blood Glucose     (75-99)  mg/dL


 


Crossmatch     














  22 Range/Units





  12:46 13:15 14:07 


 


WBC    17.6 H  (3.8-10.6)  k/uL


 


RBC     (3.80-5.40)  m/uL


 


Hgb    11.3 L  (11.4-16.0)  gm/dL


 


Hct     (34.0-46.0)  %


 


Neutrophils #    14.9 H  (1.3-7.7)  k/uL


 


Lymphocytes #     (1.0-4.8)  k/uL


 


ABG pH  7.32 L    (7.35-7.45)  


 


ABG pCO2  47 H    (35-45)  mmHg


 


ABG pO2  207 H    ()  mmHg


 


ABG Total CO2  26 H    (19-24)  mmol/L


 


ABG O2 Saturation  99.0 H    (94-97)  %


 


ABG Hematocrit     (34.0-46.0)  %


 


ABG Potassium     (3.4-4.5)  mmol/L


 


ABG Ionized Calcium     (4.5-5.3)  mg/dL


 


ABG Glucose     (75-99)  mg/dL


 


Hemoglobin     (11.4-16.0)  gm/dL


 


Sodium     (137-145)  mmol/L


 


Chloride     ()  mmol/L


 


Glucose     (74-99)  mg/dL


 


POC Glucose (mg/dL)   113 H   (75-99)  mg/dL


 


Calcium     (8.4-10.2)  mg/dL


 


Magnesium     (1.6-2.3)  mg/dL


 


AST     (14-36)  U/L


 


Total Protein     (6.3-8.2)  g/dL


 


Albumin     (3.5-5.0)  g/dL


 


TSH     (0.465-4.680)  mIU/L


 


Arterial Blood Potassium     (3.4-4.5)  mmol/L


 


Arterial Blood Glucose     (75-99)  mg/dL


 


Crossmatch     














  22 Range/Units





  14:09 15:12 15:51 


 


WBC     (3.8-10.6)  k/uL


 


RBC     (3.80-5.40)  m/uL


 


Hgb     (11.4-16.0)  gm/dL


 


Hct     (34.0-46.0)  %


 


Neutrophils #     (1.3-7.7)  k/uL


 


Lymphocytes #     (1.0-4.8)  k/uL


 


ABG pH    7.33 L  (7.35-7.45)  


 


ABG pCO2    47 H  (35-45)  mmHg


 


ABG pO2    141 H  ()  mmHg


 


ABG Total CO2    26 H  (19-24)  mmol/L


 


ABG O2 Saturation    98.3 H  (94-97)  %


 


ABG Hematocrit     (34.0-46.0)  %


 


ABG Potassium     (3.4-4.5)  mmol/L


 


ABG Ionized Calcium     (4.5-5.3)  mg/dL


 


ABG Glucose     (75-99)  mg/dL


 


Hemoglobin     (11.4-16.0)  gm/dL


 


Sodium     (137-145)  mmol/L


 


Chloride     ()  mmol/L


 


Glucose     (74-99)  mg/dL


 


POC Glucose (mg/dL)  128 H  130 H   (75-99)  mg/dL


 


Calcium     (8.4-10.2)  mg/dL


 


Magnesium     (1.6-2.3)  mg/dL


 


AST     (14-36)  U/L


 


Total Protein     (6.3-8.2)  g/dL


 


Albumin     (3.5-5.0)  g/dL


 


TSH     (0.465-4.680)  mIU/L


 


Arterial Blood Potassium     (3.4-4.5)  mmol/L


 


Arterial Blood Glucose     (75-99)  mg/dL


 


Crossmatch     














  22 Range/Units





  16:05 18:02 18:06 


 


WBC    15.9 H  (3.8-10.6)  k/uL


 


RBC     (3.80-5.40)  m/uL


 


Hgb    11.2 L  (11.4-16.0)  gm/dL


 


Hct     (34.0-46.0)  %


 


Neutrophils #    14.5 H  (1.3-7.7)  k/uL


 


Lymphocytes #    0.7 L  (1.0-4.8)  k/uL


 


ABG pH     (7.35-7.45)  


 


ABG pCO2     (35-45)  mmHg


 


ABG pO2     ()  mmHg


 


ABG Total CO2     (19-24)  mmol/L


 


ABG O2 Saturation     (94-97)  %


 


ABG Hematocrit     (34.0-46.0)  %


 


ABG Potassium     (3.4-4.5)  mmol/L


 


ABG Ionized Calcium     (4.5-5.3)  mg/dL


 


ABG Glucose     (75-99)  mg/dL


 


Hemoglobin     (11.4-16.0)  gm/dL


 


Sodium     (137-145)  mmol/L


 


Chloride     ()  mmol/L


 


Glucose     (74-99)  mg/dL


 


POC Glucose (mg/dL)  140 H  144 H   (75-99)  mg/dL


 


Calcium     (8.4-10.2)  mg/dL


 


Magnesium     (1.6-2.3)  mg/dL


 


AST     (14-36)  U/L


 


Total Protein     (6.3-8.2)  g/dL


 


Albumin     (3.5-5.0)  g/dL


 


TSH     (0.465-4.680)  mIU/L


 


Arterial Blood Potassium     (3.4-4.5)  mmol/L


 


Arterial Blood Glucose     (75-99)  mg/dL


 


Crossmatch     














  22 Range/Units





  19:03 19:50 21:12 


 


WBC     (3.8-10.6)  k/uL


 


RBC     (3.80-5.40)  m/uL


 


Hgb     (11.4-16.0)  gm/dL


 


Hct     (34.0-46.0)  %


 


Neutrophils #     (1.3-7.7)  k/uL


 


Lymphocytes #     (1.0-4.8)  k/uL


 


ABG pH     (7.35-7.45)  


 


ABG pCO2     (35-45)  mmHg


 


ABG pO2     ()  mmHg


 


ABG Total CO2     (19-24)  mmol/L


 


ABG O2 Saturation     (94-97)  %


 


ABG Hematocrit     (34.0-46.0)  %


 


ABG Potassium     (3.4-4.5)  mmol/L


 


ABG Ionized Calcium     (4.5-5.3)  mg/dL


 


ABG Glucose     (75-99)  mg/dL


 


Hemoglobin     (11.4-16.0)  gm/dL


 


Sodium     (137-145)  mmol/L


 


Chloride     ()  mmol/L


 


Glucose     (74-99)  mg/dL


 


POC Glucose (mg/dL)  126 H  130 H  129 H  (75-99)  mg/dL


 


Calcium     (8.4-10.2)  mg/dL


 


Magnesium     (1.6-2.3)  mg/dL


 


AST     (14-36)  U/L


 


Total Protein     (6.3-8.2)  g/dL


 


Albumin     (3.5-5.0)  g/dL


 


TSH     (0.465-4.680)  mIU/L


 


Arterial Blood Potassium     (3.4-4.5)  mmol/L


 


Arterial Blood Glucose     (75-99)  mg/dL


 


Crossmatch     














  22 Range/Units





  22:12 23:02 00:14 


 


WBC     (3.8-10.6)  k/uL


 


RBC     (3.80-5.40)  m/uL


 


Hgb     (11.4-16.0)  gm/dL


 


Hct     (34.0-46.0)  %


 


Neutrophils #     (1.3-7.7)  k/uL


 


Lymphocytes #     (1.0-4.8)  k/uL


 


ABG pH     (7.35-7.45)  


 


ABG pCO2     (35-45)  mmHg


 


ABG pO2     ()  mmHg


 


ABG Total CO2     (19-24)  mmol/L


 


ABG O2 Saturation     (94-97)  %


 


ABG Hematocrit     (34.0-46.0)  %


 


ABG Potassium     (3.4-4.5)  mmol/L


 


ABG Ionized Calcium     (4.5-5.3)  mg/dL


 


ABG Glucose     (75-99)  mg/dL


 


Hemoglobin     (11.4-16.0)  gm/dL


 


Sodium     (137-145)  mmol/L


 


Chloride     ()  mmol/L


 


Glucose     (74-99)  mg/dL


 


POC Glucose (mg/dL)  125 H  122 H  122 H  (75-99)  mg/dL


 


Calcium     (8.4-10.2)  mg/dL


 


Magnesium     (1.6-2.3)  mg/dL


 


AST     (14-36)  U/L


 


Total Protein     (6.3-8.2)  g/dL


 


Albumin     (3.5-5.0)  g/dL


 


TSH     (0.465-4.680)  mIU/L


 


Arterial Blood Potassium     (3.4-4.5)  mmol/L


 


Arterial Blood Glucose     (75-99)  mg/dL


 


Crossmatch     














  22 Range/Units





  01:05 02:05 03:02 


 


WBC     (3.8-10.6)  k/uL


 


RBC     (3.80-5.40)  m/uL


 


Hgb     (11.4-16.0)  gm/dL


 


Hct     (34.0-46.0)  %


 


Neutrophils #     (1.3-7.7)  k/uL


 


Lymphocytes #     (1.0-4.8)  k/uL


 


ABG pH     (7.35-7.45)  


 


ABG pCO2     (35-45)  mmHg


 


ABG pO2     ()  mmHg


 


ABG Total CO2     (19-24)  mmol/L


 


ABG O2 Saturation     (94-97)  %


 


ABG Hematocrit     (34.0-46.0)  %


 


ABG Potassium     (3.4-4.5)  mmol/L


 


ABG Ionized Calcium     (4.5-5.3)  mg/dL


 


ABG Glucose     (75-99)  mg/dL


 


Hemoglobin     (11.4-16.0)  gm/dL


 


Sodium     (137-145)  mmol/L


 


Chloride     ()  mmol/L


 


Glucose     (74-99)  mg/dL


 


POC Glucose (mg/dL)  118 H  122 H  122 H  (75-99)  mg/dL


 


Calcium     (8.4-10.2)  mg/dL


 


Magnesium     (1.6-2.3)  mg/dL


 


AST     (14-36)  U/L


 


Total Protein     (6.3-8.2)  g/dL


 


Albumin     (3.5-5.0)  g/dL


 


TSH     (0.465-4.680)  mIU/L


 


Arterial Blood Potassium     (3.4-4.5)  mmol/L


 


Arterial Blood Glucose     (75-99)  mg/dL


 


Crossmatch     














  22 Range/Units





  04:00 04:15 04:15 


 


WBC    12.3 H  (3.8-10.6)  k/uL


 


RBC    3.45 L  (3.80-5.40)  m/uL


 


Hgb    10.0 L  (11.4-16.0)  gm/dL


 


Hct    30.8 L  (34.0-46.0)  %


 


Neutrophils #    10.1 H  (1.3-7.7)  k/uL


 


Lymphocytes #     (1.0-4.8)  k/uL


 


ABG pH     (7.35-7.45)  


 


ABG pCO2     (35-45)  mmHg


 


ABG pO2     ()  mmHg


 


ABG Total CO2     (19-24)  mmol/L


 


ABG O2 Saturation     (94-97)  %


 


ABG Hematocrit     (34.0-46.0)  %


 


ABG Potassium     (3.4-4.5)  mmol/L


 


ABG Ionized Calcium     (4.5-5.3)  mg/dL


 


ABG Glucose     (75-99)  mg/dL


 


Hemoglobin     (11.4-16.0)  gm/dL


 


Sodium   133 L   (137-145)  mmol/L


 


Chloride     ()  mmol/L


 


Glucose   107 H   (74-99)  mg/dL


 


POC Glucose (mg/dL)  131 H    (75-99)  mg/dL


 


Calcium   7.6 L   (8.4-10.2)  mg/dL


 


Magnesium     (1.6-2.3)  mg/dL


 


AST   57 H   (14-36)  U/L


 


Total Protein   5.1 L   (6.3-8.2)  g/dL


 


Albumin   2.9 L   (3.5-5.0)  g/dL


 


TSH   0.062 L   (0.465-4.680)  mIU/L


 


Arterial Blood Potassium     (3.4-4.5)  mmol/L


 


Arterial Blood Glucose     (75-99)  mg/dL


 


Crossmatch     














  22 Range/Units





  05:17 06:02 06:55 


 


WBC     (3.8-10.6)  k/uL


 


RBC     (3.80-5.40)  m/uL


 


Hgb     (11.4-16.0)  gm/dL


 


Hct     (34.0-46.0)  %


 


Neutrophils #     (1.3-7.7)  k/uL


 


Lymphocytes #     (1.0-4.8)  k/uL


 


ABG pH     (7.35-7.45)  


 


ABG pCO2     (35-45)  mmHg


 


ABG pO2     ()  mmHg


 


ABG Total CO2     (19-24)  mmol/L


 


ABG O2 Saturation     (94-97)  %


 


ABG Hematocrit     (34.0-46.0)  %


 


ABG Potassium     (3.4-4.5)  mmol/L


 


ABG Ionized Calcium     (4.5-5.3)  mg/dL


 


ABG Glucose     (75-99)  mg/dL


 


Hemoglobin     (11.4-16.0)  gm/dL


 


Sodium     (137-145)  mmol/L


 


Chloride     ()  mmol/L


 


Glucose     (74-99)  mg/dL


 


POC Glucose (mg/dL)  111 H  117 H  170 H  (75-99)  mg/dL


 


Calcium     (8.4-10.2)  mg/dL


 


Magnesium     (1.6-2.3)  mg/dL


 


AST     (14-36)  U/L


 


Total Protein     (6.3-8.2)  g/dL


 


Albumin     (3.5-5.0)  g/dL


 


TSH     (0.465-4.680)  mIU/L


 


Arterial Blood Potassium     (3.4-4.5)  mmol/L


 


Arterial Blood Glucose     (75-99)  mg/dL


 


Crossmatch     














  22 Range/Units





  08:15 


 


WBC   (3.8-10.6)  k/uL


 


RBC   (3.80-5.40)  m/uL


 


Hgb   (11.4-16.0)  gm/dL


 


Hct   (34.0-46.0)  %


 


Neutrophils #   (1.3-7.7)  k/uL


 


Lymphocytes #   (1.0-4.8)  k/uL


 


ABG pH   (7.35-7.45)  


 


ABG pCO2   (35-45)  mmHg


 


ABG pO2   ()  mmHg


 


ABG Total CO2   (19-24)  mmol/L


 


ABG O2 Saturation   (94-97)  %


 


ABG Hematocrit   (34.0-46.0)  %


 


ABG Potassium   (3.4-4.5)  mmol/L


 


ABG Ionized Calcium   (4.5-5.3)  mg/dL


 


ABG Glucose   (75-99)  mg/dL


 


Hemoglobin   (11.4-16.0)  gm/dL


 


Sodium   (137-145)  mmol/L


 


Chloride   ()  mmol/L


 


Glucose   (74-99)  mg/dL


 


POC Glucose (mg/dL)  119 H  (75-99)  mg/dL


 


Calcium   (8.4-10.2)  mg/dL


 


Magnesium   (1.6-2.3)  mg/dL


 


AST   (14-36)  U/L


 


Total Protein   (6.3-8.2)  g/dL


 


Albumin   (3.5-5.0)  g/dL


 


TSH   (0.465-4.680)  mIU/L


 


Arterial Blood Potassium   (3.4-4.5)  mmol/L


 


Arterial Blood Glucose   (75-99)  mg/dL


 


Crossmatch   














- Imaging and Cardiology


Chest x-ray: image reviewed





Assessment and Plan


Assessment: 





1.  Severe mitral regurgitation with dilated mitral annulus, status post mitral 

valve repair


2.  Hypertension


3.  History of hypothyroid, currently hyperthyroid


4.  Remote history of pneumonia


5.  Occasional EtOH use, no history of withdrawal


6.  Never smoker, preoperative FEV1 111% of predicted


7.  Family history of premature coronary artery disease-father with first MI at 

42 years old,  from MI at 53 years old


8.  Vaccinated against Covid


9.  Preoperative nasal swab positive for MSSA, treated with mupirocin


10.  Postoperative acute blood loss anemia, expected 


11.  Atrial fibrillation, known common occurrence after open heart surgery, 

status post ligation of the left atrial appendage


Plan: 





1.  Continue aspirin, statin, beta blocker therapy.  Will increase beta blocker 

therapy as tolerated, increase to 25 mg twice daily today


2.  Continue amiodarone for A. fib prophylaxis, will transition to oral amioda

shameka.  No anticoagulation unless in A. fib greater than 24 hours


3.  Wean O2 as tolerated.  Encourage incentive spirometry is 10 times every hour

while awake.  Bronchodilators per pulmonology


4.  Increase activity, ambulate as tolerated.  PT/OT/cardiac rehab consulted


5.  Will monitor daily labs and x-rays.  Electrolyte replacement per protocol


6.  GI/DVT prophylaxis


7.  Pain control with current medication regimen


8.  Insulin management per primary care service.  Patient is not diabetic, 

hemoglobin A1c 5.7%


9.  Continue mediastinal/right pleural chest tube for another 24 hours


10.  Discontinue [place].  Connect Cordis to continuous CVP monitoring


11.  Continue [place] for another 24 hours for strict accurate intake and output. 

Daily weights


12.  Continue to hold Synthroid for now.  Management per primary care


13.  More recommendations to follow as patient progresses